# Patient Record
Sex: FEMALE | Race: WHITE | NOT HISPANIC OR LATINO | Employment: FULL TIME | URBAN - METROPOLITAN AREA
[De-identification: names, ages, dates, MRNs, and addresses within clinical notes are randomized per-mention and may not be internally consistent; named-entity substitution may affect disease eponyms.]

---

## 2017-01-31 ENCOUNTER — ALLSCRIPTS OFFICE VISIT (OUTPATIENT)
Dept: OTHER | Facility: OTHER | Age: 57
End: 2017-01-31

## 2017-05-01 ENCOUNTER — ALLSCRIPTS OFFICE VISIT (OUTPATIENT)
Dept: OTHER | Facility: OTHER | Age: 57
End: 2017-05-01

## 2017-05-01 DIAGNOSIS — M06.09 RHEUMATOID ARTHRITIS OF MULTIPLE SITES WITHOUT RHEUMATOID FACTOR (HCC): ICD-10-CM

## 2017-05-01 DIAGNOSIS — M15.0 PRIMARY GENERALIZED (OSTEO)ARTHRITIS: ICD-10-CM

## 2017-07-02 ENCOUNTER — LAB CONVERSION - ENCOUNTER (OUTPATIENT)
Dept: OTHER | Facility: OTHER | Age: 57
End: 2017-07-02

## 2017-07-02 LAB
A/G RATIO (HISTORICAL): 2 (CALC) (ref 1–2.5)
ALBUMIN SERPL BCP-MCNC: 4.4 G/DL (ref 3.6–5.1)
ALP SERPL-CCNC: 79 U/L (ref 33–130)
ALT SERPL W P-5'-P-CCNC: 18 U/L (ref 6–29)
AST SERPL W P-5'-P-CCNC: 26 U/L (ref 10–35)
BASOPHILS # BLD AUTO: 0.7 %
BASOPHILS # BLD AUTO: 39 CELLS/UL (ref 0–200)
BILIRUB SERPL-MCNC: 0.6 MG/DL (ref 0.2–1.2)
BUN SERPL-MCNC: 12 MG/DL (ref 7–25)
BUN/CREA RATIO (HISTORICAL): NORMAL (CALC) (ref 6–22)
CALCIUM SERPL-MCNC: 9.3 MG/DL (ref 8.6–10.4)
CHLORIDE SERPL-SCNC: 100 MMOL/L (ref 98–110)
CHOLEST SERPL-MCNC: 172 MG/DL (ref 125–200)
CHOLEST/HDLC SERPL: 1.8 (CALC)
CO2 SERPL-SCNC: 26 MMOL/L (ref 20–31)
CREAT SERPL-MCNC: 0.87 MG/DL (ref 0.5–1.05)
DEPRECATED RDW RBC AUTO: 12.5 % (ref 11–15)
EGFR AFRICAN AMERICAN (HISTORICAL): 85 ML/MIN/1.73M2
EGFR-AMERICAN CALC (HISTORICAL): 73 ML/MIN/1.73M2
EOSINOPHIL # BLD AUTO: 1.2 %
EOSINOPHIL # BLD AUTO: 67 CELLS/UL (ref 15–500)
GAMMA GLOBULIN (HISTORICAL): 2.2 G/DL (CALC) (ref 1.9–3.7)
GLUCOSE (HISTORICAL): 74 MG/DL (ref 65–99)
HCT VFR BLD AUTO: 39.4 % (ref 35–45)
HDLC SERPL-MCNC: 94 MG/DL
HGB BLD-MCNC: 13.4 G/DL (ref 11.7–15.5)
LDL CHOLESTEROL (HISTORICAL): 64 MG/DL (CALC)
LYMPHOCYTES # BLD AUTO: 1114 CELLS/UL (ref 850–3900)
LYMPHOCYTES # BLD AUTO: 19.9 %
MCH RBC QN AUTO: 33.9 PG (ref 27–33)
MCHC RBC AUTO-ENTMCNC: 34.1 G/DL (ref 32–36)
MCV RBC AUTO: 99.5 FL (ref 80–100)
MONOCYTES # BLD AUTO: 386 CELLS/UL (ref 200–950)
MONOCYTES (HISTORICAL): 6.9 %
NEUTROPHILS # BLD AUTO: 3993 CELLS/UL (ref 1500–7800)
NEUTROPHILS # BLD AUTO: 71.3 %
NON-HDL-CHOL (CHOL-HDL) (HISTORICAL): 78 MG/DL (CALC)
PLATELET # BLD AUTO: 242 THOUSAND/UL (ref 140–400)
PMV BLD AUTO: 8 FL (ref 7.5–12.5)
POTASSIUM SERPL-SCNC: 3.7 MMOL/L (ref 3.5–5.3)
RBC # BLD AUTO: 3.96 MILLION/UL (ref 3.8–5.1)
SODIUM SERPL-SCNC: 137 MMOL/L (ref 135–146)
TOTAL PROTEIN (HISTORICAL): 6.6 G/DL (ref 6.1–8.1)
TRIGL SERPL-MCNC: 68 MG/DL
TSH SERPL DL<=0.05 MIU/L-ACNC: 1.49 MIU/L (ref 0.4–4.5)
WBC # BLD AUTO: 5.6 THOUSAND/UL (ref 3.8–10.8)

## 2017-07-20 ENCOUNTER — ALLSCRIPTS OFFICE VISIT (OUTPATIENT)
Dept: OTHER | Facility: OTHER | Age: 57
End: 2017-07-20

## 2017-08-01 ENCOUNTER — ALLSCRIPTS OFFICE VISIT (OUTPATIENT)
Dept: OTHER | Facility: OTHER | Age: 57
End: 2017-08-01

## 2017-10-19 ENCOUNTER — LAB CONVERSION - ENCOUNTER (OUTPATIENT)
Dept: OTHER | Facility: OTHER | Age: 57
End: 2017-10-19

## 2017-10-19 LAB
C-REACTIVE PROTEIN (HISTORICAL): 1.1 MG/L
ERYTHROCYTE SEDIMENTATION RATE (HISTORICAL): 2 MM/H

## 2017-11-14 ENCOUNTER — HOSPITAL ENCOUNTER (OUTPATIENT)
Facility: HOSPITAL | Age: 57
Setting detail: OBSERVATION
Discharge: HOME | End: 2017-11-15
Attending: EMERGENCY MEDICINE | Admitting: INTERNAL MEDICINE
Payer: COMMERCIAL

## 2017-11-14 ENCOUNTER — APPOINTMENT (EMERGENCY)
Dept: RADIOLOGY | Facility: HOSPITAL | Age: 57
End: 2017-11-14
Payer: COMMERCIAL

## 2017-11-14 DIAGNOSIS — R42 DIZZINESS: Primary | ICD-10-CM

## 2017-11-14 PROBLEM — J45.909 ASTHMA: Status: ACTIVE | Noted: 2017-11-14

## 2017-11-14 PROBLEM — F41.9 ANXIETY: Status: ACTIVE | Noted: 2017-11-14

## 2017-11-14 PROBLEM — K21.9 GERD (GASTROESOPHAGEAL REFLUX DISEASE): Status: ACTIVE | Noted: 2017-11-14

## 2017-11-14 PROBLEM — R11.2 NAUSEA AND VOMITING: Status: ACTIVE | Noted: 2017-11-14

## 2017-11-14 PROBLEM — I10 HYPERTENSION: Status: ACTIVE | Noted: 2017-11-14

## 2017-11-14 PROBLEM — E87.6 HYPOKALEMIA: Status: ACTIVE | Noted: 2017-11-14

## 2017-11-14 PROBLEM — J30.9 ALLERGIC RHINITIS: Status: ACTIVE | Noted: 2017-11-14

## 2017-11-14 LAB
ALBUMIN SERPL BCP-MCNC: 4.1 G/DL (ref 3.5–5)
ALP SERPL-CCNC: 77 U/L (ref 46–116)
ALT SERPL W P-5'-P-CCNC: 30 U/L (ref 12–78)
ANION GAP SERPL CALCULATED.3IONS-SCNC: 10 MMOL/L (ref 4–13)
AST SERPL W P-5'-P-CCNC: 29 U/L (ref 5–45)
BASOPHILS # BLD AUTO: 0 THOUSANDS/ΜL (ref 0–0.1)
BASOPHILS NFR BLD AUTO: 1 % (ref 0–1)
BILIRUB SERPL-MCNC: 0.7 MG/DL (ref 0.2–1)
BILIRUB UR QL STRIP: NEGATIVE
BUN SERPL-MCNC: 9 MG/DL (ref 5–25)
CALCIUM SERPL-MCNC: 9.4 MG/DL (ref 8.3–10.1)
CHLORIDE SERPL-SCNC: 101 MMOL/L (ref 100–108)
CLARITY UR: CLEAR
CO2 SERPL-SCNC: 28 MMOL/L (ref 21–32)
COLOR UR: NORMAL
CREAT SERPL-MCNC: 0.83 MG/DL (ref 0.6–1.3)
EOSINOPHIL # BLD AUTO: 0.1 THOUSAND/ΜL (ref 0–0.61)
EOSINOPHIL NFR BLD AUTO: 2 % (ref 0–6)
ERYTHROCYTE [DISTWIDTH] IN BLOOD BY AUTOMATED COUNT: 12.6 % (ref 11.6–15.1)
GFR SERPL CREATININE-BSD FRML MDRD: 77 ML/MIN/1.73SQ M
GLUCOSE SERPL-MCNC: 147 MG/DL (ref 65–140)
GLUCOSE UR STRIP-MCNC: NEGATIVE MG/DL
HCT VFR BLD AUTO: 43.3 % (ref 37–47)
HGB BLD-MCNC: 14.6 G/DL (ref 12–16)
HGB UR QL STRIP.AUTO: NEGATIVE
HOLD SPECIMEN: NORMAL
KETONES UR STRIP-MCNC: NEGATIVE MG/DL
LEUKOCYTE ESTERASE UR QL STRIP: NEGATIVE
LIPASE SERPL-CCNC: 299 U/L (ref 73–393)
LYMPHOCYTES # BLD AUTO: 1.6 THOUSANDS/ΜL (ref 0.6–4.47)
LYMPHOCYTES NFR BLD AUTO: 38 % (ref 14–44)
MAGNESIUM SERPL-MCNC: 1.9 MG/DL (ref 1.6–2.6)
MCH RBC QN AUTO: 34.5 PG (ref 27–31)
MCHC RBC AUTO-ENTMCNC: 33.6 G/DL (ref 31.4–37.4)
MCV RBC AUTO: 103 FL (ref 82–98)
MONOCYTES # BLD AUTO: 0.4 THOUSAND/ΜL (ref 0.17–1.22)
MONOCYTES NFR BLD AUTO: 10 % (ref 4–12)
NEUTROPHILS # BLD AUTO: 2.1 THOUSANDS/ΜL (ref 1.85–7.62)
NEUTS SEG NFR BLD AUTO: 49 % (ref 43–75)
NITRITE UR QL STRIP: NEGATIVE
NRBC BLD AUTO-RTO: 0 /100 WBCS
PH UR STRIP.AUTO: 7.5 [PH] (ref 5–9)
PLATELET # BLD AUTO: 267 THOUSANDS/UL (ref 130–400)
PMV BLD AUTO: 7.2 FL (ref 8.9–12.7)
POTASSIUM SERPL-SCNC: 3.3 MMOL/L (ref 3.5–5.3)
PROT SERPL-MCNC: 7.3 G/DL (ref 6.4–8.2)
PROT UR STRIP-MCNC: NEGATIVE MG/DL
RBC # BLD AUTO: 4.22 MILLION/UL (ref 4.2–5.4)
SODIUM SERPL-SCNC: 139 MMOL/L (ref 136–145)
SP GR UR STRIP.AUTO: 1.01 (ref 1–1.03)
TROPONIN I SERPL-MCNC: <0.02 NG/ML
TROPONIN I SERPL-MCNC: <0.02 NG/ML
TSH SERPL DL<=0.05 MIU/L-ACNC: 1.72 UIU/ML (ref 0.36–3.74)
UROBILINOGEN UR QL STRIP.AUTO: 0.2 E.U./DL
WBC # BLD AUTO: 4.2 THOUSAND/UL (ref 4.8–10.8)

## 2017-11-14 PROCEDURE — 83735 ASSAY OF MAGNESIUM: CPT | Performed by: NURSE PRACTITIONER

## 2017-11-14 PROCEDURE — 99285 EMERGENCY DEPT VISIT HI MDM: CPT

## 2017-11-14 PROCEDURE — 84484 ASSAY OF TROPONIN QUANT: CPT | Performed by: NURSE PRACTITIONER

## 2017-11-14 PROCEDURE — 80053 COMPREHEN METABOLIC PANEL: CPT | Performed by: EMERGENCY MEDICINE

## 2017-11-14 PROCEDURE — 93005 ELECTROCARDIOGRAM TRACING: CPT | Performed by: NURSE PRACTITIONER

## 2017-11-14 PROCEDURE — 84443 ASSAY THYROID STIM HORMONE: CPT | Performed by: NURSE PRACTITIONER

## 2017-11-14 PROCEDURE — 81003 URINALYSIS AUTO W/O SCOPE: CPT | Performed by: NURSE PRACTITIONER

## 2017-11-14 PROCEDURE — 96361 HYDRATE IV INFUSION ADD-ON: CPT

## 2017-11-14 PROCEDURE — 36415 COLL VENOUS BLD VENIPUNCTURE: CPT | Performed by: EMERGENCY MEDICINE

## 2017-11-14 PROCEDURE — 87081 CULTURE SCREEN ONLY: CPT | Performed by: INTERNAL MEDICINE

## 2017-11-14 PROCEDURE — 94760 N-INVAS EAR/PLS OXIMETRY 1: CPT

## 2017-11-14 PROCEDURE — 96374 THER/PROPH/DIAG INJ IV PUSH: CPT

## 2017-11-14 PROCEDURE — 83690 ASSAY OF LIPASE: CPT | Performed by: EMERGENCY MEDICINE

## 2017-11-14 PROCEDURE — 70450 CT HEAD/BRAIN W/O DYE: CPT

## 2017-11-14 PROCEDURE — 85025 COMPLETE CBC W/AUTO DIFF WBC: CPT | Performed by: EMERGENCY MEDICINE

## 2017-11-14 PROCEDURE — 96375 TX/PRO/DX INJ NEW DRUG ADDON: CPT

## 2017-11-14 RX ORDER — ONDANSETRON 2 MG/ML
4 INJECTION INTRAMUSCULAR; INTRAVENOUS EVERY 6 HOURS PRN
Status: DISCONTINUED | OUTPATIENT
Start: 2017-11-14 | End: 2017-11-15 | Stop reason: HOSPADM

## 2017-11-14 RX ORDER — SODIUM CHLORIDE 9 MG/ML
75 INJECTION, SOLUTION INTRAVENOUS CONTINUOUS
Status: DISCONTINUED | OUTPATIENT
Start: 2017-11-14 | End: 2017-11-15 | Stop reason: HOSPADM

## 2017-11-14 RX ORDER — MECLIZINE HYDROCHLORIDE 25 MG/1
25 TABLET ORAL ONCE
Status: COMPLETED | OUTPATIENT
Start: 2017-11-14 | End: 2017-11-14

## 2017-11-14 RX ORDER — POTASSIUM CHLORIDE 20 MEQ/1
40 TABLET, EXTENDED RELEASE ORAL ONCE
Status: COMPLETED | OUTPATIENT
Start: 2017-11-14 | End: 2017-11-14

## 2017-11-14 RX ORDER — HYDRALAZINE HYDROCHLORIDE 20 MG/ML
5 INJECTION INTRAMUSCULAR; INTRAVENOUS EVERY 6 HOURS PRN
Status: DISCONTINUED | OUTPATIENT
Start: 2017-11-14 | End: 2017-11-15 | Stop reason: HOSPADM

## 2017-11-14 RX ORDER — MECLIZINE HYDROCHLORIDE 25 MG/1
25 TABLET ORAL EVERY 8 HOURS SCHEDULED
Status: DISCONTINUED | OUTPATIENT
Start: 2017-11-14 | End: 2017-11-15 | Stop reason: HOSPADM

## 2017-11-14 RX ORDER — ONDANSETRON 2 MG/ML
4 INJECTION INTRAMUSCULAR; INTRAVENOUS ONCE
Status: COMPLETED | OUTPATIENT
Start: 2017-11-14 | End: 2017-11-14

## 2017-11-14 RX ORDER — FLUTICASONE PROPIONATE 50 MCG
1 SPRAY, SUSPENSION (ML) NASAL DAILY PRN
Status: DISCONTINUED | OUTPATIENT
Start: 2017-11-14 | End: 2017-11-15 | Stop reason: HOSPADM

## 2017-11-14 RX ORDER — RABEPRAZOLE SODIUM 20 MG/1
20 TABLET, DELAYED RELEASE ORAL DAILY
COMMUNITY
End: 2018-03-06 | Stop reason: SDUPTHER

## 2017-11-14 RX ORDER — ONDANSETRON 2 MG/ML
INJECTION INTRAMUSCULAR; INTRAVENOUS
Status: COMPLETED
Start: 2017-11-14 | End: 2017-11-14

## 2017-11-14 RX ORDER — SODIUM CHLORIDE 9 MG/ML
1000 INJECTION, SOLUTION INTRAVENOUS ONCE
Status: COMPLETED | OUTPATIENT
Start: 2017-11-14 | End: 2017-11-14

## 2017-11-14 RX ORDER — HYDROCHLOROTHIAZIDE 25 MG/1
25 TABLET ORAL DAILY
COMMUNITY
End: 2018-02-28 | Stop reason: SDUPTHER

## 2017-11-14 RX ORDER — CALCIUM CARBONATE 200(500)MG
1000 TABLET,CHEWABLE ORAL DAILY PRN
Status: DISCONTINUED | OUTPATIENT
Start: 2017-11-14 | End: 2017-11-15 | Stop reason: HOSPADM

## 2017-11-14 RX ORDER — FLUTICASONE PROPIONATE 50 MCG
1 SPRAY, SUSPENSION (ML) NASAL DAILY PRN
COMMUNITY
End: 2018-10-17 | Stop reason: SDUPTHER

## 2017-11-14 RX ORDER — MORPHINE SULFATE 4 MG/ML
4 INJECTION, SOLUTION INTRAMUSCULAR; INTRAVENOUS ONCE
Status: COMPLETED | OUTPATIENT
Start: 2017-11-14 | End: 2017-11-14

## 2017-11-14 RX ORDER — LEVALBUTEROL TARTRATE 45 UG/1
1-2 AEROSOL, METERED ORAL EVERY 4 HOURS PRN
COMMUNITY
End: 2019-09-17 | Stop reason: SDUPTHER

## 2017-11-14 RX ORDER — ACETAMINOPHEN 325 MG/1
650 TABLET ORAL EVERY 6 HOURS PRN
Status: DISCONTINUED | OUTPATIENT
Start: 2017-11-14 | End: 2017-11-15 | Stop reason: HOSPADM

## 2017-11-14 RX ORDER — LEVALBUTEROL 1.25 MG/.5ML
1.25 SOLUTION, CONCENTRATE RESPIRATORY (INHALATION) EVERY 6 HOURS PRN
Status: DISCONTINUED | OUTPATIENT
Start: 2017-11-14 | End: 2017-11-15 | Stop reason: HOSPADM

## 2017-11-14 RX ORDER — RABEPRAZOLE SODIUM 20 MG/1
20 TABLET, DELAYED RELEASE ORAL DAILY
Status: DISCONTINUED | OUTPATIENT
Start: 2017-11-15 | End: 2017-11-15 | Stop reason: HOSPADM

## 2017-11-14 RX ADMIN — ONDANSETRON 4 MG: 2 INJECTION INTRAMUSCULAR; INTRAVENOUS at 07:58

## 2017-11-14 RX ADMIN — MECLIZINE HYDROCHLORIDE 25 MG: 25 TABLET ORAL at 22:01

## 2017-11-14 RX ADMIN — POTASSIUM CHLORIDE 40 MEQ: 1500 TABLET, EXTENDED RELEASE ORAL at 16:59

## 2017-11-14 RX ADMIN — SODIUM CHLORIDE 75 ML/HR: 0.9 INJECTION, SOLUTION INTRAVENOUS at 16:58

## 2017-11-14 RX ADMIN — SODIUM CHLORIDE 1000 ML/HR: 0.9 INJECTION, SOLUTION INTRAVENOUS at 07:58

## 2017-11-14 RX ADMIN — MECLIZINE HYDROCHLORIDE 25 MG: 25 TABLET ORAL at 16:59

## 2017-11-14 RX ADMIN — MECLIZINE HYDROCHLORIDE 25 MG: 25 TABLET ORAL at 11:19

## 2017-11-14 RX ADMIN — MORPHINE SULFATE 4 MG: 4 INJECTION, SOLUTION INTRAMUSCULAR; INTRAVENOUS at 08:27

## 2017-11-14 NOTE — ED PROVIDER NOTES
History  Chief Complaint   Patient presents with    Vomiting     Pt states she was at work and felt dizzy and nauseous  Also vomiting  History of vertigo  Sensitive to movement but not light  38-year-old female states that she was at work this morning after getting up and everything was normal  States that she started getting dizziness shortly after she use nasal spray for her clot nostrils at work  Patient shortly thereafter started vomiting  No other complaints        History provided by:  Patient   used: No        Prior to Admission Medications   Prescriptions Last Dose Informant Patient Reported? Taking? RABEprazole (ACIPHEX) 20 MG tablet   Yes Yes   Sig: Take 20 mg by mouth daily   fluticasone (FLONASE) 50 mcg/act nasal spray   Yes Yes   Si spray into each nostril daily as needed for rhinitis   hydrochlorothiazide (HYDRODIURIL) 25 mg tablet   Yes Yes   Sig: Take 25 mg by mouth daily   levalbuterol (XOPENEX HFA) 45 mcg/act inhaler   Yes Yes   Sig: Inhale 1-2 puffs every 4 (four) hours as needed for wheezing      Facility-Administered Medications: None       History reviewed  No pertinent past medical history  Past Surgical History:   Procedure Laterality Date    DENTAL SURGERY         History reviewed  No pertinent family history  I have reviewed and agree with the history as documented  Social History   Substance Use Topics    Smoking status: Never Smoker    Smokeless tobacco: Never Used    Alcohol use Yes      Comment: social        Review of Systems   Constitutional: Negative for activity change, chills, diaphoresis and fever  HENT: Negative for congestion, ear pain, nosebleeds, sore throat, trouble swallowing and voice change  Eyes: Negative for pain, discharge and redness  Respiratory: Negative for apnea, cough, choking, shortness of breath, wheezing and stridor  Cardiovascular: Negative for chest pain and palpitations     Gastrointestinal: Positive for nausea and vomiting  Negative for abdominal distention, abdominal pain, constipation and diarrhea  Endocrine: Negative for polydipsia  Genitourinary: Negative for difficulty urinating, dysuria, flank pain, frequency, hematuria and urgency  Musculoskeletal: Negative for back pain, gait problem, joint swelling, myalgias, neck pain and neck stiffness  Skin: Negative for pallor and rash  Neurological: Positive for dizziness  Negative for tremors, syncope, speech difficulty, weakness, numbness and headaches  Hematological: Negative for adenopathy  Psychiatric/Behavioral: Negative for confusion, hallucinations, self-injury and suicidal ideas  The patient is not nervous/anxious  Physical Exam  ED Triage Vitals   Temperature Pulse Respirations Blood Pressure SpO2   11/14/17 0748 11/14/17 0753 11/14/17 0753 11/14/17 0753 11/14/17 0753   (!) 95 7 °F (35 4 °C) 94 16 (!) 172/85 98 %      Temp Source Heart Rate Source Patient Position - Orthostatic VS BP Location FiO2 (%)   11/14/17 0748 11/14/17 0753 11/14/17 0753 11/14/17 0753 --   Tympanic Monitor Sitting Left arm       Pain Score       11/14/17 0753       8           Orthostatic Vital Signs  Vitals:    11/14/17 1115 11/14/17 1130 11/14/17 1145 11/14/17 1200   BP: 100/55 103/57 108/58 105/57   Pulse: 76      Patient Position - Orthostatic VS:           Physical Exam   Constitutional: Vital signs are normal  She appears well-developed and well-nourished  HENT:   Head: Normocephalic and atraumatic  Right Ear: External ear normal    Left Ear: External ear normal    Nose: Nose normal    Mouth/Throat: Oropharynx is clear and moist    Eyes: Conjunctivae and EOM are normal  Pupils are equal, round, and reactive to light  Neck: Normal range of motion  Neck supple  Cardiovascular: Normal rate, regular rhythm, normal heart sounds and intact distal pulses  Pulmonary/Chest: Effort normal and breath sounds normal    Abdominal: Soft   Bowel sounds are normal  Musculoskeletal: Normal range of motion  Neurological: She is alert  Skin: Skin is warm  Psychiatric: She has a normal mood and affect  Nursing note and vitals reviewed  ED Medications  Medications   ondansetron (ZOFRAN) injection 4 mg (4 mg Intravenous Given 11/14/17 0758)   sodium chloride 0 9 % infusion (0 mL/hr Intravenous Stopped 11/14/17 0939)   meclizine (ANTIVERT) tablet 25 mg (25 mg Oral Given 11/14/17 1119)   morphine (PF) 4 mg/mL injection 4 mg (4 mg Intravenous Given 11/14/17 0827)       Diagnostic Studies  Results Reviewed     Procedure Component Value Units Date/Time    Comprehensive metabolic panel [[id number]]  (Abnormal) Collected:  11/14/17 0757    Lab Status:  Final result Specimen:  Blood from Arm, Right Updated:  11/14/17 7672     Sodium 139 mmol/L      Potassium 3 3 (L) mmol/L      Chloride 101 mmol/L      CO2 28 mmol/L      Anion Gap 10 mmol/L      BUN 9 mg/dL      Creatinine 0 83 mg/dL      Glucose 147 (H) mg/dL      Calcium 9 4 mg/dL      AST 29 U/L      ALT 30 U/L      Alkaline Phosphatase 77 U/L      Total Protein 7 3 g/dL      Albumin 4 1 g/dL      Total Bilirubin 0 70 mg/dL      eGFR 77 ml/min/1 73sq m     Narrative:         National Kidney Disease Education Program recommendations are as follows:  GFR calculation is accurate only with a steady state creatinine  Chronic Kidney disease less than 60 ml/min/1 73 sq  meters  Kidney failure less than 15 ml/min/1 73 sq  meters      Lipase [[id number]]  (Normal) Collected:  11/14/17 0757    Lab Status:  Final result Specimen:  Blood from Arm, Right Updated:  11/14/17 0829     Lipase 299 u/L     CBC and differential [[id number]]  (Abnormal) Collected:  11/14/17 0757    Lab Status:  Final result Specimen:  Blood from Arm, Right Updated:  11/14/17 0806     WBC 4 20 (L) Thousand/uL      RBC 4 22 Million/uL      Hemoglobin 14 6 g/dL      Hematocrit 43 3 %       (H) fL      MCH 34 5 (H) pg      MCHC 33 6 g/dL      RDW 12 6 %      MPV 7 2 (L) fL      Platelets 810 Thousands/uL      nRBC 0 /100 WBCs      Neutrophils Relative 49 %      Lymphocytes Relative 38 %      Monocytes Relative 10 %      Eosinophils Relative 2 %      Basophils Relative 1 %      Neutrophils Absolute 2 10 Thousands/µL      Lymphocytes Absolute 1 60 Thousands/µL      Monocytes Absolute 0 40 Thousand/µL      Eosinophils Absolute 0 10 Thousand/µL      Basophils Absolute 0 00 Thousands/µL                  CT head without contrast   Final Result by Shruthi Willis MD (11/14 0939)      No acute intracranial abnormality  Workstation performed: [id number]                    Procedures  Procedures       Phone Contacts  ED Phone Contact    ED Course  ED Course as of Nov 14 1224   Tue Nov 14, 2017   1350 Hemoglobin: 14 6   1053 Hemoglobin: 14 6             NIH Stroke Scale    Flowsheet Row Most Recent Value   Level of Consciousness (1a )  0 Filed at: 11/14/2017 1220   LOC Questions (1b )  0 Filed at: 11/14/2017 1220   LOC Commands (1c )  0 Filed at: 11/14/2017 1220   Best Gaze (2 )  0 Filed at: 11/14/2017 1220   Visual (3 )  0 Filed at: 11/14/2017 1220   Facial Palsy (4 )  0 Filed at: 11/14/2017 1220   Motor Arm, Left (5a )  0 Filed at: 11/14/2017 1220   Motor Arm, Right (5b )  0 Filed at: 11/14/2017 1220   Motor Leg, Left (6a )  0 Filed at: 11/14/2017 1220   Motor Leg, Right (6b )  0 Filed at: 11/14/2017 1220   Limb Ataxia (7 )  0 Filed at: 11/14/2017 1220   Sensory (8 )  0 Filed at: 11/14/2017 1220   Best Language (9 )  0 Filed at: 11/14/2017 1220   Dysarthria (10 )  0 Filed at: 11/14/2017 1220   Extinction and Inattention (11 ) (Formerly Neglect)  0 Filed at: 11/14/2017 1220   Total  0 Filed at: 11/14/2017 1220                        MDM  Number of Diagnoses or Management Options  Dizziness:   Diagnosis management comments: I discussed with patient about staying in the hospital for observation due to still some difficulty with balance and her walking    NIH score is 0 however is not real specific for posterior circulation cerebellar issues  Patient is agreeable to stay in the hospital for further workup she has getting some Antivert with not much help  I discussed with Dr Singer Rater from hospitalist service who agrees with admission  CritCare Time    Disposition  Final diagnoses:   Dizziness     Time reflects when diagnosis was documented in both MDM as applicable and the Disposition within this note     Time User Action Codes Description Comment    11/14/2017 12:22 PM Oscar Swapnil Add [R42] Dizziness       ED Disposition     ED Disposition Condition Comment    Admit  Case was discussed with Dr Pedro Lagos and the patient's admission status was agreed to be Admission Status: observation status to the service of Dr Pedro Lagos   Follow-up Information    None       Patient's Medications   Discharge Prescriptions    No medications on file     No discharge procedures on file      ED Provider  Electronically Signed by           Mirna Simeon DO  11/14/17 2459

## 2017-11-14 NOTE — ED NOTES
Discharge instructions reviewed with patient  States understanding  Discharged to home       Carrie Fischer RN  11/14/17 1621

## 2017-11-14 NOTE — LETTER
November 15, 2017     Patient: Megan Wilson   YOB: 1960   Date of Visit: 11/14/2017       To Whom it May Concern:    Richard Andrade was seen in my clinic on 11/14/2017  She may return to work on November 20, 2017  If you have any questions or concerns, please don't hesitate to call  Sincerely,          JUDI Ryan        CC: No Recipients

## 2017-11-14 NOTE — H&P
History and Physical - [facility] Medicine    Patient Information: Neil Salazar 59 y o  female MRN: [id number]  Unit/Bed#: [facility] 310-01 Encounter: [id number]  Admitting Physician: JUDI Bustillos  PCP: Kodak Boggs MD  Date of Admission:  11/14/17    Assessment/Plan:    Hospital Problem List:     Principal Problem:    Dizziness  Active Problems:    Nausea and vomiting    Hypokalemia    Hypertension    GERD (gastroesophageal reflux disease)    Asthma    Allergic rhinitis    Anxiety      Plan for the Primary Problem(s):  Dizziness: likely vertigo   · Admit to general medicine as observation for further evaluation and treatment   · Obtain MRI brain   · Initiate IVF, scheduled Meclizine, and Zofran as needed   · Ensure electrolytes are optimized  · Check troponin level, TSH, lipid panel, A1c, Vit B12, folate, Vit D, UA, and EKG   · Monitor on continuous telemetry monitor  · PT/OT eval and treat   · Hold HCTZ    Plan for Additional Problems:   Nausea and Vomiting: Initiate Zofran IV as needed  Hypokalemia: Likely from vomiting  K 3 3  Replete and monitor in am  Check serum magnesium level  Hypertension: Holding HCTZ due to vertigo  Hydralazine IV PRN  Monitor  GERD: Pt would like to bring in home medication, Aciphex  Asthma: Well controlled  C/w Xopenex nebulizer treatments as needed  Allergic Rhinitis: C/w Flonase as needed  Anxiety: No longer taking Buspar  Monitor  VTE Prophylaxis: Enoxaparin (Lovenox)  / sequential compression device   Code Status: FULL CODE  POLST: POLST form is not discussed and not completed at this time  Anticipated Length of Stay:  Patient will be admitted on an Observation basis with an anticipated length of stay of  less than 2 midnights  Justification for Hospital Stay: vertigo, nausea, vomiting     Total Time for Visit, including Counseling / Coordination of Care: 1 hour    Greater than 50% of this total time spent on direct patient counseling and coordination of care  Chief Complaint:   Dizziness, nausea, vomiting     History of Present Illness:    Celia Blancas is a 59 y o  female with a PMH including HTN, GERD, asthma, allergic rhinitis, and anxiety who presents with dizziness  States she was in her usual state of health when she took Flonase this morning and felt like the medication 'went in the wrong area' and she subsequently developed dizziness  States her dizziness was associated with lightheadedness, nausea and vomiting  Denies headache, shortness of breath, chest pain, abdominal pain, dysuria, hematuria, or melena  Denies new medications or recent travel  Review of Systems:    Review of Systems   Constitutional: Negative for activity change, appetite change, chills, diaphoresis, fatigue and fever  HENT: Positive for congestion and ear pain  Negative for postnasal drip  Eyes: Negative for photophobia and visual disturbance  Respiratory: Negative for cough, chest tightness, shortness of breath and wheezing  Cardiovascular: Negative for chest pain, palpitations and leg swelling  Gastrointestinal: Positive for nausea and vomiting  Negative for abdominal distention, abdominal pain, blood in stool, constipation and diarrhea  Genitourinary: Negative for difficulty urinating, dysuria and hematuria  Musculoskeletal: Positive for back pain  Negative for arthralgias, gait problem and myalgias  Skin: Negative for color change, pallor and wound  Neurological: Positive for dizziness and weakness  Negative for tremors, seizures, syncope, light-headedness and headaches  Psychiatric/Behavioral: Negative for agitation and confusion  The patient is not nervous/anxious          Past Medical and Surgical History:     Past Medical History:   Diagnosis Date    Arthritis     hands       Past Surgical History:   Procedure Laterality Date    BREAST SURGERY      CARPAL TUNNEL RELEASE Bilateral     DENTAL SURGERY      WRIST SURGERY Right 2009       Meds/Allergies:    Prior to Admission medications    Medication Sig Start Date End Date Taking? Authorizing Provider   fluticasone (FLONASE) 50 mcg/act nasal spray 1 spray into each nostril daily as needed for rhinitis   Yes Historical Provider, MD   hydrochlorothiazide (HYDRODIURIL) 25 mg tablet Take 25 mg by mouth daily   Yes Historical Provider, MD   levalbuterol (XOPENEX HFA) 45 mcg/act inhaler Inhale 1-2 puffs every 4 (four) hours as needed for wheezing   Yes Historical Provider, MD   RABEprazole (ACIPHEX) 20 MG tablet Take 20 mg by mouth daily   Yes Historical Provider, MD     I have reviewed home medications with patient personally  Allergies: No Known Allergies    Social History:     Marital Status: /Civil Union   Patient Pre-hospital Living Situation: Home with    Patient Pre-hospital Level of Mobility: Independent without use of assistive device   Patient Pre-hospital Diet Restrictions: None  Substance Use History:   History   Alcohol Use    Yes     Comment: social     History   Smoking Status    Never Smoker   Smokeless Tobacco    Never Used     History   Drug Use No       Family History:    non-contributory    Physical Exam:     Vitals:   Blood Pressure: 116/55 (11/14/17 1314)  Pulse: 71 (11/14/17 1314)  Temperature: 98 2 °F (36 8 °C) (11/14/17 1314)  Temp Source: Oral (11/14/17 1314)  Respirations: 16 (11/14/17 1314)  Height: 5' 5 5" (166 4 cm) (11/14/17 1314)  Weight - Scale: 65 1 kg (143 lb 8 3 oz) (11/14/17 1314)  SpO2: 98 % (11/14/17 1314)    Physical Exam   Constitutional: She is oriented to person, place, and time  She appears well-developed and well-nourished  She is cooperative  No distress  HENT:   Head: Normocephalic and atraumatic  Right Ear: Hearing, tympanic membrane, external ear and ear canal normal  No drainage, swelling or tenderness  No decreased hearing is noted     Left Ear: Hearing, tympanic membrane, external ear and ear canal normal  Mouth/Throat: Oropharynx is clear and moist    Eyes: Conjunctivae are normal  Pupils are equal, round, and reactive to light  Right eye exhibits no discharge and no exudate  Left eye exhibits no discharge and no exudate  No scleral icterus  Right eye exhibits nystagmus  Left eye exhibits nystagmus  Neck: Normal range of motion  Neck supple  Cardiovascular: Normal rate, regular rhythm, normal heart sounds and intact distal pulses  Pulmonary/Chest: Effort normal and breath sounds normal  No respiratory distress  She has no wheezes  She has no rales  Abdominal: Soft  Bowel sounds are normal  She exhibits no distension  There is no tenderness  There is no rebound and no guarding  Musculoskeletal: Normal range of motion  She exhibits no edema or tenderness  Neurological: She is alert and oriented to person, place, and time  No cranial nerve deficit  Skin: Skin is warm and dry  She is not diaphoretic  No erythema  Psychiatric: She has a normal mood and affect  Her behavior is normal  Judgment and thought content normal        Additional Data:   Labs:    Results from last 7 days  Lab Units 11/14/17  0757   WBC Thousand/uL 4 20*   HEMOGLOBIN g/dL 14 6   HEMATOCRIT % 43 3   PLATELETS Thousands/uL 267   NEUTROS PCT % 49   LYMPHS PCT % 38   MONOS PCT % 10   EOS PCT % 2       Results from last 7 days  Lab Units 11/14/17  0757   SODIUM mmol/L 139   POTASSIUM mmol/L 3 3*   CHLORIDE mmol/L 101   CO2 mmol/L 28   BUN mg/dL 9   CREATININE mg/dL 0 83   CALCIUM mg/dL 9 4   TOTAL PROTEIN g/dL 7 3   BILIRUBIN TOTAL mg/dL 0 70   ALK PHOS U/L 77   ALT U/L 30   AST U/L 29   GLUCOSE RANDOM mg/dL 147*           * I Have Reviewed All Lab Data Listed Above  * Additional Pertinent Lab Tests Reviewed: All Labs Within Last 24 Hours Reviewed    Imaging:  Imaging Reports Reviewed Today Include: Procedure: Ct Head Without Contrast    Result Date: 11/14/2017  Narrative: CT BRAIN - WITHOUT CONTRAST INDICATION:  Dizziness  COMPARISON:  None  TECHNIQUE:  CT examination of the brain was performed  In addition to axial images, coronal reformatted images were created and submitted for interpretation  Radiation dose length product (DLP) for this visit:  1159 17 mGy-cm   This examination, like all CT scans performed in the [facility], was performed utilizing techniques to minimize radiation dose exposure, including the use of iterative reconstruction and automated exposure control  IMAGE QUALITY:  Diagnostic  FINDINGS:  PARENCHYMA:  No intracranial mass, mass effect or midline shift  No CT signs of acute infarction  There is no parenchymal hemorrhage  VENTRICLES AND EXTRA-AXIAL SPACES:  Normal for patient's age  VISUALIZED ORBITS AND PARANASAL SINUSES:  Trace mucosal disease in the left sphenoid sinus  No air-fluid levels  Normal orbits  CALVARIUM AND EXTRACRANIAL SOFT TISSUES:  Osteopenia  No fracture or focal lytic/blastic lesions  No acute soft tissue swelling  Impression: No acute intracranial abnormality  Workstation performed: [id number]       EKG, Pathology, and Other Studies Reviewed on Admission:   · EKG: None available - will order    Allscripts Records Reviewed: Yes     ** Please Note: Dragon 360 Dictation voice to text software may have been used in the creation of this document   **

## 2017-11-14 NOTE — ED NOTES
Pt amb to BR with Ax2  Gait unsteady  C/O dizziness  MD aware       Jamison Higginbotham, RN  11/14/17 1200

## 2017-11-14 NOTE — PLAN OF CARE
Problem: RESPIRATORY - ADULT  Goal: Achieves optimal ventilation and oxygenation  INTERVENTIONS:  - Assess for changes in respiratory status  - Assess for changes in mentation and behavior  - Position to facilitate oxygenation and minimize respiratory effort  - Oxygen administration by appropriate delivery method based on oxygen saturation (per order) or ABGs  - Initiate smoking cessation education as indicated  - Encourage broncho-pulmonary hygiene including cough, deep breathe, Incentive Spirometry  - Assess the need for suctioning and aspirate as needed  - Assess and instruct to report SOB or any respiratory difficulty  - Respiratory Therapy support as indicated  Outcome: Progressing  resp poc intiated for prn neb tx,  Pt states hasnt used at home for asthma for two years,  Will follow and update on 11/17

## 2017-11-15 ENCOUNTER — APPOINTMENT (OUTPATIENT)
Dept: RADIOLOGY | Facility: HOSPITAL | Age: 57
End: 2017-11-15
Payer: COMMERCIAL

## 2017-11-15 VITALS
WEIGHT: 143.52 LBS | SYSTOLIC BLOOD PRESSURE: 114 MMHG | RESPIRATION RATE: 16 BRPM | HEIGHT: 66 IN | HEART RATE: 65 BPM | BODY MASS INDEX: 23.07 KG/M2 | OXYGEN SATURATION: 96 % | DIASTOLIC BLOOD PRESSURE: 57 MMHG | TEMPERATURE: 98.3 F

## 2017-11-15 PROBLEM — E87.6 HYPOKALEMIA: Status: RESOLVED | Noted: 2017-11-14 | Resolved: 2017-11-15

## 2017-11-15 PROBLEM — R42 DIZZINESS: Status: RESOLVED | Noted: 2017-11-14 | Resolved: 2017-11-15

## 2017-11-15 PROBLEM — R11.2 NAUSEA AND VOMITING: Status: RESOLVED | Noted: 2017-11-14 | Resolved: 2017-11-15

## 2017-11-15 LAB
ANION GAP SERPL CALCULATED.3IONS-SCNC: 6 MMOL/L (ref 4–13)
ATRIAL RATE: 68 BPM
BUN SERPL-MCNC: 5 MG/DL (ref 5–25)
CALCIUM SERPL-MCNC: 8.6 MG/DL (ref 8.3–10.1)
CHLORIDE SERPL-SCNC: 108 MMOL/L (ref 100–108)
CHOLEST SERPL-MCNC: 173 MG/DL (ref 50–200)
CO2 SERPL-SCNC: 28 MMOL/L (ref 21–32)
CREAT SERPL-MCNC: 0.79 MG/DL (ref 0.6–1.3)
ERYTHROCYTE [DISTWIDTH] IN BLOOD BY AUTOMATED COUNT: 12.7 % (ref 11.6–15.1)
EST. AVERAGE GLUCOSE BLD GHB EST-MCNC: 105 MG/DL
FOLATE SERPL-MCNC: 19.3 NG/ML (ref 3.1–17.5)
GFR SERPL CREATININE-BSD FRML MDRD: 82 ML/MIN/1.73SQ M
GLUCOSE P FAST SERPL-MCNC: 98 MG/DL (ref 65–99)
GLUCOSE SERPL-MCNC: 98 MG/DL (ref 65–140)
HBA1C MFR BLD: 5.3 % (ref 4.2–6.3)
HCT VFR BLD AUTO: 39.8 % (ref 37–47)
HDLC SERPL-MCNC: 102 MG/DL (ref 40–60)
HGB BLD-MCNC: 12.9 G/DL (ref 12–16)
LDLC SERPL CALC-MCNC: 63 MG/DL (ref 0–100)
MCH RBC QN AUTO: 33.3 PG (ref 27–31)
MCHC RBC AUTO-ENTMCNC: 32.5 G/DL (ref 31.4–37.4)
MCV RBC AUTO: 103 FL (ref 82–98)
P AXIS: 48 DEGREES
PLATELET # BLD AUTO: 196 THOUSANDS/UL (ref 130–400)
PMV BLD AUTO: 7.5 FL (ref 8.9–12.7)
POTASSIUM SERPL-SCNC: 4.6 MMOL/L (ref 3.5–5.3)
PR INTERVAL: 148 MS
QRS AXIS: 64 DEGREES
QRSD INTERVAL: 70 MS
QT INTERVAL: 396 MS
QTC INTERVAL: 421 MS
RBC # BLD AUTO: 3.88 MILLION/UL (ref 4.2–5.4)
SODIUM SERPL-SCNC: 142 MMOL/L (ref 136–145)
T WAVE AXIS: 45 DEGREES
TRIGL SERPL-MCNC: 42 MG/DL
VENTRICULAR RATE: 68 BPM
VIT B12 SERPL-MCNC: 526 PG/ML (ref 100–900)
WBC # BLD AUTO: 4.3 THOUSAND/UL (ref 4.8–10.8)

## 2017-11-15 PROCEDURE — 80061 LIPID PANEL: CPT | Performed by: NURSE PRACTITIONER

## 2017-11-15 PROCEDURE — 82652 VIT D 1 25-DIHYDROXY: CPT | Performed by: NURSE PRACTITIONER

## 2017-11-15 PROCEDURE — 83036 HEMOGLOBIN GLYCOSYLATED A1C: CPT | Performed by: NURSE PRACTITIONER

## 2017-11-15 PROCEDURE — 80048 BASIC METABOLIC PNL TOTAL CA: CPT | Performed by: NURSE PRACTITIONER

## 2017-11-15 PROCEDURE — 70551 MRI BRAIN STEM W/O DYE: CPT

## 2017-11-15 PROCEDURE — 82607 VITAMIN B-12: CPT | Performed by: NURSE PRACTITIONER

## 2017-11-15 PROCEDURE — 94760 N-INVAS EAR/PLS OXIMETRY 1: CPT

## 2017-11-15 PROCEDURE — 85027 COMPLETE CBC AUTOMATED: CPT | Performed by: NURSE PRACTITIONER

## 2017-11-15 PROCEDURE — 82746 ASSAY OF FOLIC ACID SERUM: CPT | Performed by: NURSE PRACTITIONER

## 2017-11-15 RX ORDER — MECLIZINE HYDROCHLORIDE 25 MG/1
25 TABLET ORAL EVERY 8 HOURS PRN
Qty: 10 TABLET | Refills: 0 | Status: SHIPPED | OUTPATIENT
Start: 2017-11-15

## 2017-11-15 RX ADMIN — SODIUM CHLORIDE 75 ML/HR: 0.9 INJECTION, SOLUTION INTRAVENOUS at 05:28

## 2017-11-15 RX ADMIN — MECLIZINE HYDROCHLORIDE 25 MG: 25 TABLET ORAL at 13:05

## 2017-11-15 RX ADMIN — RABEPRAZOLE SODIUM 20 MG: 20 TABLET, DELAYED RELEASE ORAL at 09:58

## 2017-11-15 RX ADMIN — ENOXAPARIN SODIUM 40 MG: 40 INJECTION SUBCUTANEOUS at 09:59

## 2017-11-15 RX ADMIN — MECLIZINE HYDROCHLORIDE 25 MG: 25 TABLET ORAL at 06:24

## 2017-11-15 NOTE — DISCHARGE SUMMARY
Discharge Summary - [facility] Internal Medicine    Patient Information: Bell Julien 59 y o  female MRN: [id number]  Unit/Bed#: [address] [place] [address] Encounter: [id number]    Discharging Physician / Practitioner: Leeanne Aase, CRNP  PCP: Simone Duran MD  Admission Date: 11/14/2017  Discharge Date: 11/15/17    Reason for Admission: Dizziness    Discharge Diagnoses:     Principal Problem:    Dizziness  Active Problems:    Nausea and vomiting    Hypokalemia    Hypertension    GERD (gastroesophageal reflux disease)    Asthma    Allergic rhinitis    Anxiety  Resolved Problems:    * No resolved hospital problems  *      Consultations During Hospital Stay:  · none    Procedures Performed:   Ct head w/o contrast, PARENCHYMA:  No intracranial mass, mass effect or midline shift  No CT signs of acute infarction  There is no parenchymal hemorrhage  VENTRICLES AND EXTRA-AXIAL SPACES:  Normal for patient's age  VISUALIZED ORBITS AND PARANASAL SINUSES:  Trace mucosal disease in the left sphenoid sinus  No air-fluid levels  Normal orbits  CALVARIUM AND EXTRACRANIAL SOFT TISSUES:  Osteopenia  No fracture or focal lytic/blastic lesions  No acute soft tissue swelling  IMPRESSION: No acute intracranial abnormality      MRI brain w/o contrast, BRAIN PARENCHYMA:  There is no discrete mass, mass effect or midline shift  No abnormal white matter signal identified  Brainstem and cerebellum demonstrate normal signal  There is no intracranial hemorrhage  There is no evidence of acute infarction and  diffusion imaging is unremarkable  VENTRICLES:  The ventricles are normal in size and contour  SELLA AND PITUITARY GLAND:  Normal  ORBITS:  Normal   PARANASAL SINUSES:  Normal  VASCULATURE:  Evaluation of the major intracranial vasculature demonstrates appropriate flow voids   CALVARIUM AND SKULL BASE:  Trace medial left mastoid effusion likely not clinically significant EXTRACRANIAL SOFT TISSUES:  Normal ,IMPRESSION: No significant intracranial abnormalities identified, Trace left mastoid effusion, likely incidental    Significant Findings:     · none    Incidental Findings:   MRI brain w/o contrast, Trace left mastoid effusion    Ct head w/o contrast,   Trace mucosal disease in the left sphenoid sinus    Test Results Pending at Discharge (will require follow up): Vit D 1,25 dihydroxy, pending     Outpatient Tests Requested:  · none    Complications:  none    Hospital Course:     Sydney Zavala is a 59 y o  female patient who originally presented to the hospital on 11/14/2017 with a PMH of  hypertension, GERD, asthma, allergic rhinitis, and anxiety who presents with dizziness with associated symptoms of lightheadedness, nausea and vomiting  CT scan of the brain in the ED was negative  She was admitted and placed on telemetry  Her telemetry monitor showed no arrhythmias  Two troponins were completed, unremarkable  TSH, hemoglobin A1c and vitamin B12 all normal   Lipid profile, cholesterol 173, triglycerides 42,  and LDL 63  MRI brain without contrast, no significant intracranial abnormalities identified, trace left mastoid effusion  She denied any dizziness or lightheadedness overnight  She states she was eating and tolerating her food well  She was instructed to follow up with her ENT and her PCP  She was also instructed if she feels any bulging,tenderness or redness behind her ears to return immediately back to the ED          Condition at Discharge: stable     Discharge Day Visit / Exam:     Subjective:  She is observed lying in bed, offering no complaints of pain or discomfort at present  She denies any dizziness or lightheadedness  She denies chest pain, abdominal pain, or headache  She denies nausea, vomiting, or diarrhea       Vitals: Blood Pressure: 131/77 (11/15/17 1057)  Pulse: 77 (11/15/17 1057)  Temperature: 98 2 °F (36 8 °C) (11/15/17 1057)  Temp Source: Tympanic (11/15/17 1057)  Respirations: 16 (11/15/17 1057)  Height: 5' 5 5" (166 4 cm) (11/14/17 1314)  Weight - Scale: 65 1 kg (143 lb 8 3 oz) (11/14/17 1314)  SpO2: 98 % (11/15/17 0811)  Exam:   Physical Exam   Constitutional: She is oriented to person, place, and time  She appears well-developed and well-nourished  No distress  HENT:   Head: Normocephalic and atraumatic  Neck: Normal range of motion  Neck supple  Cardiovascular: Normal rate, regular rhythm and normal heart sounds  Exam reveals no gallop and no friction rub  No murmur heard  Pulmonary/Chest: Effort normal and breath sounds normal  No respiratory distress  She has no wheezes  She has no rales  She exhibits no tenderness  Abdominal: Soft  Bowel sounds are normal  She exhibits no distension and no mass  There is no tenderness  There is no rebound and no guarding  Musculoskeletal: Normal range of motion  She exhibits no edema, tenderness or deformity  Neurological: She is alert and oriented to person, place, and time  No cranial nerve deficit  Skin: Skin is warm and dry  No rash noted  She is not diaphoretic  No erythema  No pallor  Psychiatric: She has a normal mood and affect  Her behavior is normal  Thought content normal        Discharge instructions/Information to patient and family:   See after visit summary for information provided to patient and family  Provisions for Follow-Up Care:  See after visit summary for information related to follow-up care and any pertinent home health orders  Disposition:     Home    For Discharges to [facility] SNF:   · Not Applicable to this Patient - Not Applicable to this Patient    Planned Readmission:  Not anticipated     Discharge Statement:  I spent 35 minutes discharging the patient  This time was spent on the day of discharge  I had direct contact with the patient on the day of discharge   Greater than 50% of the total time was spent examining patient, answering all patient questions, arranging and discussing plan of care with patient as well as directly providing post-discharge instructions  Additional time then spent on discharge activities  Discharge Medications:  See after visit summary for reconciled discharge medications provided to patient and family  ** Please Note: Dragon 360 Dictation voice to text software may have been used in the creation of this document   **

## 2017-11-15 NOTE — CASE MANAGEMENT
Initial Clinical Review    Admission: Date/Time/Statement: 11/14/17 1223    Orders Placed This Encounter   Procedures    Place in Observation (expected length of stay for this patient is less than two midnights)     Standing Status:   Standing     Number of Occurrences:   1     Order Specific Question:   Admitting Physician     Answer:   Mansfield Cowden     Order Specific Question:   Level of Care     Answer:   Med Surg [16]         ED: Date/Time/Mode of Arrival:   ED Arrival Information     Expected Arrival Acuity Means of Arrival Escorted By Service Admission Type    - 11/14/2017 07:45 Urgent Ambulance [facility] Urgent    Arrival Complaint    NAUSEA/VOMITING          Chief Complaint:   Chief Complaint   Patient presents with    Vomiting     Pt states she was at work and felt dizzy and nauseous  Also vomiting  History of vertigo  Sensitive to movement but not light  History of Illness: 59 y o  female with a PMH including HTN, GERD, asthma, allergic rhinitis, and anxiety who presents with dizziness  States she was in her usual state of health when she took Flonase this morning and felt like the medication 'went in the wrong area' and she subsequently developed dizziness  States her dizziness was associated with lightheadedness, nausea and vomiting  Denies headache, shortness of breath, chest pain, abdominal pain, dysuria, hematuria, or melena  Denies new medications or recent travel       ED Vital Signs:   ED Triage Vitals   Temperature Pulse Respirations Blood Pressure SpO2   11/14/17 0748 11/14/17 0753 11/14/17 0753 11/14/17 0753 11/14/17 0753   (!) 95 7 °F (35 4 °C) 94 16 (!) 172/85 98 %      Temp Source Heart Rate Source Patient Position - Orthostatic VS BP Location FiO2 (%)   11/14/17 0748 11/14/17 0753 11/14/17 0753 11/14/17 0753 --   Tympanic Monitor Sitting Left arm       Pain Score       11/14/17 0753       8        Wt Readings from Last 1 Encounters:   11/14/17 65 1 kg (143 lb 8 3 oz)       Vital Signs (abnormal): Abnormal Labs/Diagnostic Test Results: TROPONIN NEG X2 ,  WC 4 3   CT HEAD No acute intracranial abnormality   ED Treatment:   Medication Administration from 11/14/2017 0745 to 11/14/2017 1311       Date/Time Order Dose Route Action Action by Comments     11/14/2017 0758 ondansetron (ZOFRAN) injection 4 mg 4 mg Intravenous Given Shin Ann RN      11/14/2017 0939 sodium chloride 0 9 % infusion 0 mL/hr Intravenous Stopped Sotero Jimenez RN      11/14/2017 0758 sodium chloride 0 9 % infusion 1,000 mL/hr Intravenous New Bag Shin Ann RN      11/14/2017 1119 meclizine (ANTIVERT) tablet 25 mg 25 mg Oral Given Sotero Jimenez RN      11/14/2017 0820 meclizine (ANTIVERT) tablet 25 mg 25 mg Oral Not Given Sotero Jimenez RN can't swallow or pick head up      11/14/2017 0827 morphine (PF) 4 mg/mL injection 4 mg 4 mg Intravenous Given Sotero Jimenez RN           Past Medical/Surgical History: Active Ambulatory Problems     Diagnosis Date Noted    No Active Ambulatory Problems     Resolved Ambulatory Problems     Diagnosis Date Noted    No Resolved Ambulatory Problems     Past Medical History:   Diagnosis Date    Arthritis        Admitting Diagnosis: Dizziness [R42]  Nausea and vomiting [R11 2]    Age/Sex: 59 y o  female    Assessment/Plan:   Principal Problem:    Dizziness  Active Problems:    Nausea and vomiting    Hypokalemia    Hypertension    GERD (gastroesophageal reflux disease)    Asthma    Allergic rhinitis    Anxiety        Plan for the Primary Problem(s):  Dizziness: likely vertigo   ? Admit to general medicine as observation for further evaluation and treatment   ? Obtain MRI brain   ? Initiate IVF, scheduled Meclizine, and Zofran as needed   ? Ensure electrolytes are optimized  ? Check troponin level, TSH, lipid panel, A1c, Vit B12, folate, Vit D, UA, and EKG   ? Monitor on continuous telemetry monitor  ?  PT/OT eval and treat ? Hold HCTZ     Plan for Additional Problems:   Nausea and Vomiting: Initiate Zofran IV as needed       Hypokalemia: Likely from vomiting  K 3 3  Replete and monitor in am  Check serum magnesium level      Hypertension: Holding HCTZ due to vertigo  Hydralazine IV PRN  Monitor       GERD: Pt would like to bring in home medication, Aciphex       Asthma: Well controlled  C/w Xopenex nebulizer treatments as needed       Allergic Rhinitis: C/w Flonase as needed      Anxiety: No longer taking Buspar  Monitor        VTE Prophylaxis: Enoxaparin (Lovenox)  / sequential compression device   Code Status: FULL CODE  POLST: POLST form is not discussed and not completed at this time      Anticipated Length of Stay:  Patient will be admitted on an Observation basis with an anticipated length of stay of  less than 2 midnights     Justification for Hospital Stay: vertigo, nausea, vomiting     Admission Orders:  OBSERVATION  TELE MON  MRI BRAIN   Scheduled Meds:   enoxaparin 40 mg Subcutaneous Daily   meclizine 25 mg Oral Q8H [facility]   RABEprazole 20 mg Oral Daily     Continuous Infusions:   sodium chloride 75 mL/hr Last Rate: 75 mL/hr (11/15/17 0528)     PRN Meds:   acetaminophen    calcium carbonate    fluticasone    hydrALAZINE    levalbuterol    ondansetron

## 2017-11-15 NOTE — NURSING NOTE
Pt D/C via walking accompanied by RN and spouse  IV D/C and intact  Discharge instructions and Rx given to patient  No further questions at this time  Pt refused and signed the flu and pneumonia vaccinations  Non smoker

## 2017-11-15 NOTE — PLAN OF CARE
DISCHARGE PLANNING     Discharge to home or other facility with appropriate resources Progressing        INFECTION - ADULT     Absence or prevention of progression during hospitalization Progressing     Absence of fever/infection during neutropenic period Progressing        Knowledge Deficit     Patient/family/caregiver demonstrates understanding of disease process, treatment plan, medications, and discharge instructions Progressing        NEUROSENSORY - ADULT     Achieves stable or improved neurological status Progressing        PAIN - ADULT     Verbalizes/displays adequate comfort level or baseline comfort level Progressing        Potential for Falls     Patient will remain free of falls Progressing        RESPIRATORY - ADULT     Achieves optimal ventilation and oxygenation Progressing        SAFETY ADULT     Maintain or return to baseline ADL function Progressing     Maintain or return mobility status to optimal level Progressing     Patient will remain free of falls Progressing

## 2017-11-15 NOTE — DISCHARGE INSTRUCTIONS
If you develop any bulging,tenderness or redness behind her ears please call your PCP or return back to [address] [place] [address] ED    Please make an appointment with your ENT for follow-up within 1-2 weeks  Please keep your scheduled dental appointment

## 2017-11-15 NOTE — SOCIAL WORK
SW referral received to assess needs and assist with DCP  Observation notice signed  DASH discussion completed  Pt resides in a private home with her  Dylan [place])  She is generally independent  Pt does not have any DME  Nor, does she have any home health services in place  Pt uses [facility] on [address] in [place]  Her PCP is Dr Pipe Parisi  Pt does not have a POA, or Living Will  Documents for has been provided  Pt is employed and does drive   will transport at discharge  Pt reported that she does not need any services  SW is available should plans change

## 2017-11-16 LAB — MRSA NOSE QL CULT: NORMAL

## 2017-11-16 NOTE — CASE MANAGEMENT
Notification of Discharge  This is a Notification of Discharge from our facility [address]  Please be advised that this patient has been discharge from our facility  Below you will find the admission and discharge date and time including the patients disposition  PRESENTATION DATE: 11/14/2017  7:45 AM  IP ADMISSION DATE: N/A N/A  DISCHARGE DATE: 11/15/2017  1:35 PM  DISPOSITION: [id number] in the [facility] by Kendall Welch for 2017  Network Utilization Review Department  Phone: [phone]; Fax [phone]  ATTENTION: The Network Utilization Review Department is now centralized for our 7 Facilities  Make a note that we have a new phone and fax numbers for our Department  Please call with any questions or concerns to [phone] and carefully follow the prompts so that you are directed to the right person  All voicemails are confidential  Fax any determinations, approvals, denials, and requests for initial or continue stay review clinical to [phone]  Due to HIGH CALL volume, it would be easier if you could please send faxed requests to expedite your requests and in part, help us provide discharge notifications faster

## 2017-11-17 LAB — 1,25(OH)2D3 SERPL-MCNC: 55.1 PG/ML (ref 19.9–79.3)

## 2017-11-21 ENCOUNTER — ALLSCRIPTS OFFICE VISIT (OUTPATIENT)
Dept: OTHER | Facility: OTHER | Age: 57
End: 2017-11-21

## 2017-12-11 ENCOUNTER — ALLSCRIPTS OFFICE VISIT (OUTPATIENT)
Dept: OTHER | Facility: OTHER | Age: 57
End: 2017-12-11

## 2017-12-11 DIAGNOSIS — M06.09 RHEUMATOID ARTHRITIS OF MULTIPLE SITES WITHOUT RHEUMATOID FACTOR (HCC): ICD-10-CM

## 2018-01-12 NOTE — PROGRESS NOTES
Assessment    1  Rheumatoid arthritis of multiple sites with negative rheumatoid factor (714 0) (M06 09)   2  Primary generalized (osteo)arthritis (715 09) (M15 0)   3  Vitamin D deficiency (268 9) (E55 9)   4  NSAID long-term use (V58 64) (Z79 1)   5  Benign essential hypertension (401 1) (I10)   6  GERD without esophagitis (530 81) (K21 9)    Plan    1  Hydroxychloroquine Sulfate 200 MG Oral Tablet; Take 1 and 1/2 tablet daily as   directed   2  MethylPREDNISolone 4 MG Oral Tablet Therapy Pack; Take as directed on   instruction   3  Follow-up visit in 2 months Evaluation and Treatment  Follow-up  Status: Complete    Done: 32RSN1663    4  Call [phone] if: The pain seems worse ; Status:Complete;   Done: 50JFB8610   5  Call [phone] if: The symptoms seem worse ; Status:Complete;   Done:   33LSF2749   6  Call [phone] if: You have questions or concerns about your problem ;   Status:Complete;   Done: 04NLP5909    7  Advil 200 MG Oral Tablet; TAKE 3 TABLET 4 times daily PRN pain    Discussion/Summary    Ms Gerber did not start on the Plaquenil since her last evaluation, she has significant concerns about taking it with the ibuprofen  She was also unsure if it would worsen her ankle pain  She continues to have persistent left ankle pain and swelling, which has not improved since her last evaluation  She did see her primary care physician and had an x-ray which ruled out any sort of fracture  She also continues to have pain in her bilateral hands, as well as aching in her leg when her ankle is more swollen  She has been wearing an ankle brace at this time without any significant relief in her pain  She is taking up to 13 tablets of ibuprofen daily for her discomfort without significant relief  She does report swelling in her hands and her knees as well  She denies any significant morning stiffness  She does report difficulty sleeping because of pain, nonrestorative sleep, and fatigue   On exam, there is synovitis of the PIPs of the hands, as well as the bilateral ankles, left more so than right  There is also mild increased warmth of the bilateral ankles  No new labs were available for review today  Review of an x-ray report of the left ankle was negative for any underlying fracture  At this time, her rheumatoid arthritis does appear active and uncontrolled despite ibuprofen use  I did explain that her left ankle pain and swelling is likely due to a significant flare of her rheumatoid arthritis involving that joint  This pain and swelling flare was likely precipitated by her twist injury  I did offer her a cortisone injection in the left ankle which she declined  We will therefore treat her with a Medrol Dosepak to see if this improves her pain  This will hopefully also decrease her ibuprofen usage  I did advise her that she needs to start on the Plaquenil at this time, as this will also help with her pain and her swelling  She is agreeable to do so  I instructed her to contact me if she does not have any significant improvement in her pain after taking the Medrol Dosepak  I will reevaluate her in 2 months  She will call in the interim if there are any questions or concerns  Counseling  Rheumatology Counseling Documentation: The patient was counseled regarding instructions for management, impressions and risks and benefits of treatment options  Chief Complaint  F/U RA   Patient is here today for follow up of chronic conditions described in HPI  History of Present Illness  Pt  returns for F/U for RA  Did not take the new medication because of worsening ankle pain  Taking Ibuprofen on a regular basis  Had X-ray which was negative for fracture  Was unsure if HCQ would irritate her stomach more  Taking 13 tablets of Ibuprofen daily for pain  Wearing ankle brace at this time without relief  c/o aching in leg when ankle is swollen  + pain in hands  No other significant joint pain   + swelling in hands and knees  No AM stiffness  + difficulty sleeping due to pain  + non-restorative sleep  + fatigue  RAPID3: 3 8/30      Review of Systems    Constitutional: fatigue, but no fever, no recent weight gain, no chills, no recent weight loss and no anorexia  HEENT: dryness of the eyes and feeling congested, but no blurred vision, no double vision, no amaurosis fugax, no eye pain, no erythema eye(s), no dryness mouth, no mouth sores and no sore throat  Cardiovascular: swelling in the arms or legs, but no chest pain or pressure and no dyspnea on exertion  Respiratory: no unusual or persistent cough, no shortness of breath and no pleurisy  Gastrointestinal: heartburn, but no abdominal pain, no nausea, no vomiting, no diarrhea, no constipation, no melena and no BRBPR  Genitourinary: no foamy urine, but no dysuria and no hematuria  Musculoskeletal: as noted in HPI  Integumentary no rash, no Raynaud's, no alopecia, no nail changes and no photosensitivity  Endocrine no polyuria and no polydipsia  Hematologic/Lymphatic: no unusual bleeding    The patient presents with complaints of a tendency for easy bruising  Symptoms are unchanged  Neurological: tingling, but no headache, no vertigo or dizziness and no weakness  Psychiatric: insomnia and non-restorative sleep  Active Problems    1  Alcohol use (V49 89) (Z78 9)   2  Allergic rhinitis (477 9) (J30 9)   3  Ankle pain, left (719 47) (M25 572)   4  Arthralgia of hand (719 44) (M79 643)   5  Asthma (493 90) (J45 909)   6  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)   7  Benign essential hypertension (401 1) (I10)   8  Encounter for routine gynecological examination (V72 31) (Z01 419)   9  Encounter for screening mammogram for malignant neoplasm of breast (V76 12)   (Z12 31)   10  GERD without esophagitis (530 81) (K21 9)   11  Hyperlipidemia (272 4) (E78 5)   12  Leiomyoma of uterus (218 9) (D25 9)   13  Nervousness (799 21) (R45 0)   14   NSAID long-term use (V58 64) (Z79 1)   15  Pain of thigh, unspecified laterality (729 5) (M79 659)   16  Primary generalized (osteo)arthritis (715 09) (M15 0)   17  Rheumatoid arthritis of multiple sites with negative rheumatoid factor (714 0) (M06 09)   18  Right knee pain (719 46) (M25 561)   19  Soft Tissue Pain In Lower Extremities (729 5)   20  Spider veins (448 1) (I78 1)   21  Sprain of ankle, left (845 00) (S93 402A)   22  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 8) (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 893)   23  Vitamin D deficiency (268 9) (E55 9)    Past Medical History    1  History of Abnormal findings on diagnostic imaging of breast (793 89) (R92 8)   2  Acute sinusitis (461 9) (J01 90)   3  History of Adjustment disorder with depressed mood (309 0) (F43 21)   4  History of Anxiety (300 00) (F41 9)   5  History of Blood tests for routine general physical examination (V72 62) (Z00 00)   6  History of Breast pain (611 71) (N64 4)   7  History of Colonoscopy (Fiberoptic) Screening   8  History of Cough (786 2) (R05)   9  History of Encounter for cervical Pap smear with pelvic exam (V76 2,V72 31) (Z01 419)   10  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)   11  History of Female pelvic pain (625 9) (R10 2)   12  History of  1 (V22 0) (Z34 00)   13  History of polyarthritis (V13 4) (Z87 39)   14  History of self breast exam   15  History of upper respiratory infection (V12 09) (Z87 09)   16  History of uterine leiomyoma (V13 29) (Z86 018)   17  History of Multiple joint pain (719 49) (M25 50)   18  History of Need for prophylactic vaccination and inoculation against influenza (V04 81)    (Z23)   19  History of Pain in joint of left shoulder (719 41) (M25 512)   20  History of Pain in wrist, unspecified laterality (719 43) (M25 539)   21  History of Sprained Ribs (848 3)   22  Tick bite of neck (910 4,E906 4) (X56 81SQ,K02  XXXA)   23  Upper respiratory infection (465 9) (J06 9)    The active problems and past medical history were reviewed and updated today  Surgical History    1  History of Biopsy Breast Percutaneous Needle Core   2  History of Dental Surgery   3  History of Dilation And Curettage   4  History of Hysteroscopy   5  History of Neuroplasty Median Nerve At Carpal Tunnel   6  History of Surgical Treatment For    7  History of Tonsillectomy    The surgical history was reviewed and updated today  Family History  Mother    1  Family history of Coronary Artery Disease (V17 49)   2  Family history of skin cancer (V16 8) (Z80 8)   3  Family history of varicose veins (V17 49) (Z82 49)   4  Family history of Hypertension (V17 49)   5  Family history of Uterine Cancer (V16 49)  Father    6  Family history of Coronary Artery Disease (V17 49)   7  Family history of Hypertension (V17 49)   8  Family history of Stent Indications  Sister    5  Family history of colitis (V18 59) (Z83 79)  Maternal Grandmother    10  Family history of rheumatoid arthritis (V17 7) (Z82 61)  Family History    11  Denied: Family history of Crohn's disease   12  Denied: Family history of psoriasis   13  Denied: Family history of systemic lupus erythematosus   14  Denied: Family history of ulcerative colitis    The family history was reviewed and updated today  Social History    · Alcohol use (V49 89) (Z78 9)   · Alcohol Use (History)   · Always uses seat belt   · Caffeine use (V49 89) (F15 90)   · Denied: History of Drug Use   · Denied: History of    ·    · Never a smoker   · No drug use   · [employer]  The social history was reviewed and updated today  The social history was reviewed and is unchanged  Current Meds   1  Aciphex 20 MG Oral Tablet Delayed Release; TAKE 1 TABLET TWICE DAILY  Requested   for: 18OEB3973; Last Rx:95Ifd0920 Ordered   2  Advil 200 MG Oral Tablet; TAKE 3 TABLET 4 times daily PRN pain; Therapy: (Recorded:2016) to Recorded   3   BusPIRone HCl - 15 MG Oral Tablet; 1/2 tab by mouth twice a day; Last Rx:31Oct2014   Ordered   4  Elidel 1 % External Cream; APPLY AS DIRECTED bid; Therapy: 46HEH7041 to Recorded   5  Fluticasone Propionate 50 MCG/ACT Nasal Suspension; USE 2 SPRAYS IN EACH   NOSTRIL ONCE DAILY; Therapy: 80JWY7936 to (Last ZP:93WMM1285)  Requested for: 00AVJ2993 Ordered   6  Hydrochlorothiazide 25 MG Oral Tablet; TAKE 1 TABLET DAILY  Requested for:   93QRH3631; Last Rx:17Yuk4447 Ordered   7  Multi-Vitamin Gummies CHEW; take 2 tablet daily; Therapy: (Recorded:78Wcg9843) to Recorded   8  Xopenex HFA 45 MCG/ACT Inhalation Aerosol; INHALE 2 PUFFS EVERY 4-6 HOURS AS   NEEDED  Requested for: 88RHM9812; Last Rx:97Kcl3140 Ordered    The medication list was reviewed and updated today  Immunizations  Influenza --- Cary Janae: Temporarily Deferred: Pt requests deferral   Tdap --- Cary Janae: Apr 2007     Allergies    1  No Known Drug Allergies    2  No Known Environmental Allergies   3  No Known Food Allergies    Vitals  Signs [Data Includes: Current Encounter]   Recorded: [id number] 03:46PM   Heart Rate: 76  Systolic: 328  Diastolic: 80  Weight: 754 lb   BMI Calculated: 25 26  BSA Calculated: 1 71    Physical Exam    Constitutional   General appearance: No acute distress, well appearing and well nourished  Eyes   Conjunctiva and lids: No swelling, erythema or discharge  Pupils and irises: Equal, round and reactive to light  Ears, Nose, Mouth, and Throat   External inspection of ears and nose: Normal     Oropharynx: Abnormal   (+ poor dentition) Oral mucosa was dry  Pulmonary   Respiratory effort: No increased work of breathing or signs of respiratory distress  Auscultation of lungs: Clear to auscultation  Cardiovascular   Auscultation of heart: Normal rate and rhythm, normal S1 and S2, without murmurs  Examination of extremities for edema and/or varicosities: Abnormal   varicosities noted bilaterally  Lymphatic   Palpation of lymph nodes in neck: No lymphadenopathy  Psychiatric   Orientation to person, place, and time: Normal     Mood and affect: Normal       Right thumb MCP swelling  Right glenohumeral joint tenderness and restricted ROM  Left thumb MCP swelling  Left glenohumeral joint tenderness and restricted ROM  Right ankle tenderness and restricted ROM  Left ankle tenderness and swelling  Right Upper Extremity: Right Hand: Right Hand Appearance: Heberden's nodule at the all DIPs digit and ulnar deviation  Right Wrist: Right Elbow: Right Shoulder:   Left Upper Extremity: Left Hand: Appearance: all DIPs digit(s) Heberden's Node(s) and ~Udigit(s) ulnar deviation  Left Wrist: Left Elbow: Left Shoulder:   Musculoskeletal - Joints, bones, and muscles: Abnormal  Palpation - bilateral ankle increased warmth and left knee crepitus  Skin - Skin and subcutaneous tissue: Normal    Neurologic - Sensation: Normal      The patient has tenderness in all MCP and PIP joints of the right hand  The patient has tenderness in all MCP and PIP joints of the left hand  The patient has swelling of all PIP joints of the right hand  The patient has swelling of all PIP joints of the left hand  Results/Data  Results   * XR ANKLE 3+ VIEW LEFT [id number] 08:59AM Brooke Collazo    Order Number: [id number]     Test Name Result Flag Reference   XR ANKLE 3+ VW LEFT (Report)     LEFT ANKLE     INDICATION: Left lateral ankle pain and swelling after twisting injury  COMPARISON: 10/13/2008     VIEWS: 3; 3 images     FINDINGS:     There is no acute fracture or dislocation  No degenerative changes  No lytic or blastic lesions are seen  There is soft tissue swelling over the lateral malleolus  IMPRESSION:     No acute osseous abnormality  Workstation performed: [id number]     Signed by:   Mehdi Ocampo MD   4/8/16       Health Management  Benign essential hypertension   EKG/ECG- POC; every 1 year; Last 92ETJ6580; Next Due: 58EMS4443;  Active  Encounter for routine gynecological examination   (every) THINPREP PAP; every 2 years; Last 28MHS0337; Next Due: 17WHB1668; Overdue  Encounter for screening mammogram for malignant neoplasm of breast   DIGTL SCRN MAMMO W/CAD; every 1 year; Last 99GYT9264; Next Due: 89UCQ2658; Active  History of Colonoscopy (Fiberoptic) Screening   COLONOSCOPY; every 10 years; Last 86Isk1452; Next Due: 29ZWB8513; Active    Future Appointments    Date/Time Provider Specialty Site   06/28/2016 05:00 PM DIANNA Modi [address] [place] [address]   08/04/2016 03:40 PM Yevgeniy Armendariz DO Rheumatology [facility] [address] [facility]     Signatures   Electronically signed by : Martha Reynolds DO; Jun 7 2016  5:22PM EST                       (Author)

## 2018-01-12 NOTE — PROGRESS NOTES
Assessment    1  Rheumatoid arthritis of multiple sites with negative rheumatoid factor (714 0) (M06 09)   2  Vitamin D deficiency (268 9) (E55 9)   3  Primary generalized (osteo)arthritis (715 09) (M15 0)   4  NSAID long-term use (V58 64) (Z79 1)   5  Benign essential hypertension (401 1) (I10)   6  Hyperlipidemia (272 4) (E78 5)   7  GERD without esophagitis (530 81) (K21 9)   8  Alcohol use (V49 89) (Z78 9)    Plan    1  Hydroxychloroquine Sulfate 200 MG Oral Tablet; TAKE 1 and 1/2 TABLETS Daily   With Food   2  Call [phone] if: The pain seems worse ; Status:Complete;   Done: 07Apr2016   3  Call [phone] if: The symptoms seem worse ; Status:Complete;   Done:   07Apr2016   4  Call [phone] if: You have questions or concerns about your problem ;   Status:Complete;   Done: 07Apr2016   5  Follow-up visit in 2 months Evaluation and Treatment  Follow-up  Status: Complete    Done: 07Apr2016    Discussion/Summary    Ms Espinal has had some improvement in her hand pain since starting on the vitamin D supplementation  She continues to have some discomfort, which is relieved well with the Advil  She is also noted some significant left ankle pain and swelling, she does report that she recently twisted several months ago, but it has not improved over time  She has noted swelling in her bilateral hands and knees, as well as the left ankle  She does report some mild morning stiffness typically lasting several minutes in duration  She reports occasional difficulty sleeping because of pain, nonrestorative sleep, and fatigue  On exam, there is mild synovitis of the PIPs of her hands  She does have chronic rheumatoid deformities of the bilateral hands  There is mild synovitis of the bilateral knees, as well as the left ankle  The left ankle is significantly tender to palpation   Review of laboratory studies revealed negative connective-tissue disease serologies, a normal ESR and CRP, a normal TSH, but a decreased vitamin D level  At this time, her history and exam to appear consistent with an inflammatory arthropathy, likely a seronegative rheumatoid arthritis given her laboratory studies  We did discuss several treatment options, and we have opted to start Plaquenil 300 mg daily  I did instruct her on the need for an annual eye exam while taking this medication  She will continue the vitamin D supplementation since this is been helping with her arthralgias  I will reevaluate her in 2 months  She will call in interim if there are any questions or concerns  Counseling  Rheumatology Counseling Documentation: The patient was counseled regarding diagnostic results, instructions for management, impressions and risks and benefits of treatment options  The following educational handouts were provided: RA and Hydroxychloroquine  Chief Complaint  F/U polyarthritis   Patient is here today for follow up of chronic conditions described in HPI  History of Present Illness  Pt  returns for F/U for polyarthritis  Hands are feeling a little better since starting Vitamin D  Still with some discomfort despite this  Pain is intermittent  Taking Advil as needed for pain with good relief  Also with some L ankle pain -> recently twisted it  + swelling in knees and hands  + AM stiffness x several minutes  + occasional difficulty sleeping due to pain  + non-restorative sleep  + fatigue  RAPID3: 4 3/30      Review of Systems    Constitutional: recent 5 lb weight gain and fatigue, but no fever, no chills, no recent weight loss and no anorexia  HEENT: dryness of the eyes, eye pain and feeling congested, but no blurred vision, no double vision, no amaurosis fugax, no erythema eye(s), no dryness mouth, no mouth sores and no sore throat  Cardiovascular: swelling in the arms or legs, but no chest pain or pressure and no dyspnea on exertion  Respiratory: no unusual or persistent cough, no shortness of breath and no pleurisy  Gastrointestinal: heartburn, but no abdominal pain, no nausea, no vomiting, no diarrhea, no constipation, no melena and no BRBPR  Genitourinary: no foamy urine, but no dysuria and no hematuria  Musculoskeletal: as noted in HPI  Integumentary photosensitivity, but no rash, no Raynaud's, no alopecia and no nail changes  Endocrine no polyuria and no polydipsia  Hematologic/Lymphatic: no unusual bleeding    The patient presents with complaints of a tendency for easy bruising Symptoms are unchanged (2/2 meds)  Neurological: no headache, no vertigo or dizziness, no tingling and no weakness  Psychiatric: insomnia and non-restorative sleep  Active Problems    1  Allergic rhinitis (477 9) (J30 9)   2  Arthralgia of hand (719 44) (M79 643)   3  Asthma (493 90) (J45 909)   4  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)   5  Benign essential hypertension (401 1) (I10)   6  GERD without esophagitis (530 81) (K21 9)   7  Hyperlipidemia (272 4) (E78 5)   8  Leiomyoma of uterus (218 9) (D25 9)   9  Nervousness (799 21) (R45 0)   10  NSAID long-term use (V58 64) (Z79 1)   11  Pain of thigh, unspecified laterality (729 5) (M79 659)   12  Primary generalized (osteo)arthritis (715 09) (M15 0)   13  Right knee pain (719 46) (M25 561)   14  Soft Tissue Pain In Lower Extremities (729 5)   15  Spider veins (448 1) (I78 1)   16  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 8) (I83 893)   17  Vitamin D deficiency (268 9) (E55 9)    Past Medical History    1  History of Abnormal findings on diagnostic imaging of breast (793 89) (R92 8)   2  Acute sinusitis (461 9) (J01 90)   3  History of Adjustment disorder with depressed mood (309 0) (F43 21)   4  History of Anxiety (300 00) (F41 9)   5  History of Blood tests for routine general physical examination (V72 62) (Z00 00)   6  History of Breast pain (611 71) (N64 4)   7  History of Colonoscopy (Fiberoptic) Screening   8  History of Cough (786 2) (R05)   9   History of Encounter for cervical Pap smear with pelvic exam (V76 2,V72 31) (Z01 419)   10  History of Encounter for routine gynecological examination (V72 31) (Z01 419)   11  History of Encounter for screening mammogram for malignant neoplasm of breast    (V76 12) (Z12 31)   12  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)   13  History of Female pelvic pain (625 9) (R10 2)   14  History of  1 (V22 0) (Z34 00)   15  History of polyarthritis (V13 4) (Z87 39)   16  History of self breast exam   17  History of upper respiratory infection (V12 09) (Z87 09)   18  History of uterine leiomyoma (V13 29) (Z86 018)   19  History of Multiple joint pain (719 49) (M25 50)   20  History of Need for prophylactic vaccination and inoculation against influenza (V04 81)    (Z23)   21  History of Pain in joint of left shoulder (719 41) (M25 512)   22  History of Pain in wrist, unspecified laterality (719 43) (M25 539)   23  History of Sprained Ribs (848 3)   24  Tick bite of neck (910 4,E906 4) (R41 84RW,F49  XXXA)   25  Upper respiratory infection (465 9) (J06 9)    The active problems and past medical history were reviewed and updated today  Surgical History    1  History of Biopsy Breast Percutaneous Needle Core   2  History of Dental Surgery   3  History of Dilation And Curettage   4  History of Hysteroscopy   5  History of Neuroplasty Median Nerve At Carpal Tunnel   6  History of Surgical Treatment For    7  History of Tonsillectomy    The surgical history was reviewed and updated today  Family History    1  Family history of Coronary Artery Disease (V17 49)   2  Family history of skin cancer (V16 8) (Z80 8)   3  Family history of varicose veins (V17 49) (Z82 49)   4  Family history of Hypertension (V17 49)   5  Family history of Uterine Cancer (V16 49)    6  Family history of Coronary Artery Disease (V17 49)   7  Family history of Hypertension (V17 49)   8  Family history of Stent Indications    9   Family history of colitis (V18 59) (Z83 79)    10  Family history of rheumatoid arthritis (V17 7) (Z82 61)    11  Denied: Family history of Crohn's disease   12  Denied: Family history of psoriasis   13  Denied: Family history of systemic lupus erythematosus   14  Denied: Family history of ulcerative colitis    The family history was reviewed and updated today  Social History    · Alcohol use (V49 89) (Z78 9)   · Alcohol Use (History)   · Always uses seat belt   · Caffeine use (V49 89) (F15 90)   · Denied: History of Drug Use   · Denied: History of    ·    · Never A Smoker   · No drug use   · Jain  The social history was reviewed and updated today  The social history was reviewed and is unchanged  Current Meds   1  Aciphex 20 MG Oral Tablet Delayed Release; TAKE 1 TABLET TWICE DAILY  Requested   for: 49OXZ9876; Last Rx:34Fag4743 Ordered   2  Advil 200 MG Oral Tablet; TAKE 2 TABLET 3 times daily PRN pain; Therapy: (Recorded:26Mjc4283) to Recorded   3  BusPIRone HCl - 15 MG Oral Tablet; 1/2 tab by mouth twice a day; Last Rx:2014   Ordered   4  Elidel 1 % External Cream; APPLY AS DIRECTED bid; Therapy: 82WYF2173 to Recorded   5  Fluticasone Propionate 50 MCG/ACT Nasal Suspension; USE 2 SPRAYS IN EACH   NOSTRIL ONCE DAILY; Therapy: 74SDH3349 to (Last TF:62JOE7130)  Requested for: 45FSR9914 Ordered   6  Hydrochlorothiazide 25 MG Oral Tablet; TAKE 1 TABLET DAILY  Requested for:   23HNX2126; Last Rx:2015 Ordered   7  Multi-Vitamin Gummies CHEW; take 2 tablet daily; Therapy: (Recorded:41Mdj5643) to Recorded   8  Ocean Nasal Spray 0 65 % Nasal Solution; Irrigate your nose with 2 sprays twice a day; Therapy: 50ZIU9939 to (Last Rx:2015) Ordered   9  Xopenex HFA 45 MCG/ACT Inhalation Aerosol; INHALE 2 PUFFS EVERY 4-6 HOURS AS   NEEDED  Requested for: 34LTS8350; Last Rx:2015 Ordered    The medication list was reviewed and updated today        Immunizations  Influenza --- Blanca Lyles: Temporarily Deferred: Pt requests deferral   Tdap --- Teresa Mcfarlane: Apr 2007     Allergies    1  No Known Drug Allergies    Vitals  Signs [Data Includes: Current Encounter]   Recorded: 41HVZ8179 03:45PM   Heart Rate: 78  Systolic: 097  Diastolic: 80  Weight: 613 lb   BMI Calculated: 25 43  BSA Calculated: 1 71    Physical Exam    Constitutional   General appearance: No acute distress, well appearing and well nourished  Eyes   Conjunctiva and lids: No swelling, erythema or discharge  Pupils and irises: Equal, round and reactive to light  Ears, Nose, Mouth, and Throat   External inspection of ears and nose: Normal     Oropharynx: Abnormal   (+ poor dentition) Oral mucosa was dry  Pulmonary   Respiratory effort: No increased work of breathing or signs of respiratory distress  Auscultation of lungs: Clear to auscultation  Cardiovascular   Auscultation of heart: Normal rate and rhythm, normal S1 and S2, without murmurs  Examination of extremities for edema and/or varicosities: Abnormal   varicosities noted bilaterally  Lymphatic   Palpation of lymph nodes in neck: No lymphadenopathy  Psychiatric   Orientation to person, place, and time: Normal     Mood and affect: Normal       Right thumb MCP swelling  Right glenohumeral joint tenderness and restricted ROM  Left thumb MCP swelling  Left glenohumeral joint tenderness and restricted ROM  Right Upper Extremity: Right Hand: Right Hand Appearance: Heberden's nodule at the all DIPs digit and ulnar deviation  Right Wrist: Right Elbow: Right Shoulder:   Left Upper Extremity: Left Hand: Appearance: all DIPs digit(s) Heberden's Node(s) and ~Udigit(s) ulnar deviation  Left Wrist: Left Elbow: Left Shoulder:   Musculoskeletal - Joints, bones, and muscles: Abnormal  Palpation - left knee crepitus  Skin - Skin and subcutaneous tissue: Normal    Neurologic - Sensation: Normal      The patient has tenderness in all MCP and PIP joints of the right hand     The patient has tenderness in all MCP and PIP joints of the left hand  The patient has swelling of all PIP joints of the right hand  The patient has swelling of all PIP joints of the left hand  Results/Data  Results   (1) HIV AG/AB COMBO, 4TH GEN [id number] 07:59AM Niels Vann     Test Name Result Flag Reference   HIV AG/AB, 4TH GEN NON-REACTIVE  NON-REACTIVE   A Nonreactive HIV Ag/Ab result does not exclude  HIV infection since the time frame for seroconversion  is variable  If acute HIV infection is suspected,  a HIV-1 RNA Qualitative TMA test is recommended  PLEASE NOTE: This information has been disclosed to you  from records whose confidentiality may be protected by  state law  If your state requires such protection, then  the state law prohibits you from making any further  disclosure of the information without the specific  written consent of the person to whom it pertains, or  as otherwise permitted by law  A general authorization  for the release of medical or other information is NOT  sufficient for this purpose  The performance of this assay has not been clinically  validated in patients less than 3years old  For additional information please refer to  http://JuicyCanvas/faq/NMQ498  (This link is being provided for informational/  educational purposes only )     (1) COMPREHENSIVE METABOLIC PANEL [id number] 71:15VW Niels Mon     Test Name Result Flag Reference   GLUCOSE 92 mg/dL  65-99   Fasting reference interval   UREA NITROGEN (BUN) 11 mg/dL  7-25   CREATININE 0 78 mg/dL  0 50-1 05   For patients >52years of age, the reference limit  for Creatinine is approximately 13% higher for people  identified as -American  eGFR NON-AFR   AMERICAN 84 mL/min/1 73m2  > OR = 60   eGFR AFRICAN AMERICAN 97 mL/min/1 73m2  > OR = 60   BUN/CREATININE RATIO   3-52   NOT APPLICABLE (calc)   SODIUM 138 mmol/L  135-146   POTASSIUM 3 7 mmol/L  3 5-5 3   CHLORIDE 98 mmol/L     CARBON DIOXIDE 28 mmol/L  19-30   CALCIUM 9 7 mg/dL  8 6-10 4   PROTEIN, TOTAL 6 8 g/dL  6 1-8 1   ALBUMIN 4 5 g/dL  3 6-5 1   GLOBULIN 2 3 g/dL (calc)  1 9-3 7   ALBUMIN/GLOBULIN RATIO 2 0 (calc)  1 0-2 5   BILIRUBIN, TOTAL 0 7 mg/dL  0 2-1 2   ALKALINE PHOSPHATASE 69 U/L     AST 22 U/L  10-35   ALT 18 U/L  6-29     (1) HLA B27 94Imd5642 07:59AM Carleene Laundry     Test Name Result Flag Reference   HLA-B27 ANTIGEN NEGATIVE  NEGATIVE     (Q) SED RATE BY MODIFIED WESTERGREN [id number] 07:59AM Carleene Laundry     Test Name Result Flag Reference   SED RATE BY MODIFIED$WESTERGREN 2 mm/h  < OR = 30     (1) CBC/PLT/DIFF [id number] 07:59AM Carleene Laundry     Test Name Result Flag Reference   WHITE BLOOD CELL COUNT 3 6 Thousand/uL L 3 8-10 8   RED BLOOD CELL COUNT 4 13 Million/uL  3 80-5 10   HEMOGLOBIN 13 7 g/dL  11 7-15 5   HEMATOCRIT 42 0 %  35 0-45 0    6 fL H 80 0-100 0   MCH 33 2 pg H 27 0-33 0   MCHC 32 6 g/dL  32 0-36 0   RDW 12 6 %  11 0-15 0   PLATELET COUNT 061 Thousand/uL  140-400   MPV 8 2 fL  7 5-11 5   ABSOLUTE NEUTROPHILS 2034 cells/uL  8193-6891   ABSOLUTE LYMPHOCYTES 1094 cells/uL  850-3900   ABSOLUTE MONOCYTES 299 cells/uL  200-950   ABSOLUTE EOSINOPHILS 133 cells/uL     ABSOLUTE BASOPHILS 40 cells/uL  0-200   NEUTROPHILS 56 5 %     LYMPHOCYTES 30 4 %     MONOCYTES 8 3 %     EOSINOPHILS 3 7 %     BASOPHILS 1 1 %       (Q) ESTEBAN IFA SCREEN W/REFL TO TITER AND PATTERN, IFA [id number] 07:59AM Carleene Laundry     Test Name Result Flag Reference   ESTEBAN SCREEN, IFA NEGATIVE  NEGATIVE     (Q) CYCLIC CITRULLINATED PEPTIDE (CCP) AB (IGG) [id number] 07:59AM Carleene Laundry     Test Name Result Flag Reference   CYCLIC CITRULLINATED$PEPTIDE (CCP) AB (IGG) <16 UNITS     Reference Range  Negative:            <20  Weak Positive:       20-39  Moderate Positive:   40-59  Strong Positive:     >59     (1) RF QUANTITATION [id number] 07:59AM Italo Laundry     Test Name Result Flag Reference   RHEUMATOID FACTOR 8 IU/mL  <14     (1) C-REACTIVE PROTEIN 47Pwp2227 07:59AM Emelina Cure     Test Name Result Flag Reference   C-REACTIVE PROTEIN 0 13 mg/dL  <0 80   Please be advised that patients taking Carboxypenicillins  may exhibit falsely decreased C-Reactive Protein levels  due to an analytical interference in this assay  (Q) SJOGREN'S ANTIBODIES (SS-A,SS-B) [id number] 07:59AM Emelina Cure     Test Name Result Flag Reference   SJOGREN'S ANTIBODY (SS-A) <1 0 NEG AI  <1 0 NEG   SJOGREN'S ANTIBODY (SS-B) <1 0 NEG AI  <1 0 NEG     *(Q) VITAMIN D, 25-HYDROXY, LC/MS/MS 65Iko6977 07:59AM Emelina Cure     Test Name Result Flag Reference   VITAMIN D, 25-OH, TOTAL 24 ng/mL L    Vitamin D Status         25-OH Vitamin D:     Deficiency:                    <20 ng/mL  Insufficiency:             20 - 29 ng/mL  Optimal:                 > or = 30 ng/mL     For 25-OH Vitamin D testing on patients on   D2-supplementation and patients for whom quantitation   of D2 and D3 fractions is required, the QuestAssureD(TM)  25-OH VIT D, (D2,D3), LC/MS/MS is recommended: order   code 79484 (patients >2yrs)  For more information on this test, go to:  http://[employer]ful/faq/TGE945  (This link is being provided for   informational/educational purposes only )     (Q) TSH, 3RD GENERATION W/REFLEX TO FT4 [id number] 07:59AM Emelina Cure     Test Name Result Flag Reference   TSH W/REFLEX TO FT4 0 96 mIU/L  0 40-4 50       Health Management  Benign essential hypertension   EKG/ECG- POC; every 1 year; Last 05BTS3647; Next Due: 83VPB0040; Active  History of Colonoscopy (Fiberoptic) Screening   COLONOSCOPY; every 10 years; Last 48Umo3856; Next Due: 35PHP9567; Active    Future Appointments    Date/Time Provider Specialty Site   04/08/2016 08:00 AM DIANNA Medley  [place] [id number]   05/10/2016 05:00 PM DIANNA Medley   Family Medicine [address] [place] [address] Drive   06/07/2016 03:40 PM Tarah Zee DO Rheumatology [facility] [address] [facility]     Signatures   Electronically signed by : Iona Reilly DO;  Apr 7 2016  9:14PM EST                       (Author)

## 2018-01-13 VITALS
SYSTOLIC BLOOD PRESSURE: 124 MMHG | HEIGHT: 64 IN | BODY MASS INDEX: 24.01 KG/M2 | DIASTOLIC BLOOD PRESSURE: 82 MMHG | HEART RATE: 72 BPM | WEIGHT: 140.6 LBS | RESPIRATION RATE: 16 BRPM

## 2018-01-14 VITALS
RESPIRATION RATE: 18 BRPM | SYSTOLIC BLOOD PRESSURE: 132 MMHG | HEART RATE: 84 BPM | WEIGHT: 145.2 LBS | HEIGHT: 64 IN | BODY MASS INDEX: 24.79 KG/M2 | DIASTOLIC BLOOD PRESSURE: 84 MMHG

## 2018-01-15 NOTE — PROGRESS NOTES
Assessment    1  Rheumatoid arthritis of multiple sites with negative rheumatoid factor (714 0) (M06 09)   2  Primary generalized (osteo)arthritis (715 09) (M15 0)   3  Vitamin D deficiency (268 9) (E55 9)   4  NSAID long-term use (V58 64) (Z79 1)   5  Benign essential hypertension (401 1) (I10)   6  GERD without esophagitis (530 81) (K21 9)   7  Long-term use of hydroxychloroquine (V58 69) (Z79 899)   8  Left ankle sprain (845 00) (S93 402A)    Plan    1  1 - Ken SPENCER, Lori Carcamo  (Orthopedic Surgery) Physician Referral  Consult- Please   evaluate and treat for left ankle sprain for several months, persistent swelling and pain  Thank you! Status: Active  Requested for: 29Wwa3146  Care Summary provided  : Yes    2  Hydroxychloroquine Sulfate 200 MG Oral Tablet; TAKE 1 TABLET DAILY WITH   FOOD   3  Follow-up visit in 2 months Evaluation and Treatment  Follow-up  Status: Hold For -   Scheduling  Requested for: 46Ugw5250   4  (1) CBC/PLT/DIFF; Status:Active; Requested for:10Auy5704;    5  (1) COMPREHENSIVE METABOLIC PANEL; Status:Active; Requested for:86Sog7445;    6  (1) C-REACTIVE PROTEIN; Status:Active; Requested for:08Asc1689;    7  (1) SED RATE; Status:Active; Requested SIE:05WOI5501;     8  Call [phone] if: The pain seems worse ; Status:Complete;   Done: 55RDN1163   9  Call [phone] if: The symptoms seem worse ; Status:Complete;   Done:   30NNU4960   10  Call [phone] if: You have questions or concerns about your problem ;    Status:Complete;   Done: 83IHK0421    Discussion/Summary    This is a 57-year-old female presenting today for follow-up for rheumatoid arthritis  Patient states that she's been feeling well however she does continue to have discomfort in her hands  She also describes swelling in her hands and occasional swelling in the knees as well  She also continues to have persistent pain and swelling in the left ankle due to an ankle sprain that occurred in December   She was treated with a course of prednisone at the last appointment which helped significantly with her pain  She denies any further obvious joint swelling however does describe morning stiffness lasting several minutes  She does have difficulty with sleep due to pain mostly related to her activity throughout the day  She does describe non-restorative sleep as well as fatigue  She currently utilizes hydroxychloroquine however has only been taking one 200 mg tablet daily due to nausea when increasing the medication  She has found that this medication has helped with her pain  She is currently waiting to see an orthopedic for evaluation of her left ankle  She also has plans to schedule an eye exam but has no appointment at this time  On physical exam, patient does have tenderness of the MCPs and PIPs bilaterally as well as synovitis of the PIPs bilaterally  She also has tenderness of bilateral shoulders as well as the left ankle which does appear to be swollen and warm to the touch  Patient has crepitus of bilateral knees  Patient did not have any lab since her last appointment  At this time patient's history and physical is most consistent with rheumatoid arthritis which appears to be mildly active at this time  Patient would like to continue with hydroxychloroquine 200 mg daily at this time to prevent any further symptoms of nausea  We will plan to see patient back in the office in 3 months time and we will repeat a CBC, CMP, CRP, and ESR prior to that appointment  Patient was given a referral to see Dr Alexandra Vaughan for evaluation of her left ankle pain and swelling  Patient will call in the interim if she has any further questions or concerns  The patient was counseled regarding instructions for management, prognosis, patient and family education, risks and benefits of treatment options, importance of compliance with treatment        Chief Complaint  F/U RA   Patient is here today for follow up of chronic conditions described in HPI  History of Present Illness  Patient is in the office today for follow up for RA  Feeling good at this time  Pain in the hands at this time  Swelling in both hands and some knees swelling  No other obvious joint swelling  +Am stiffness x few minutes once she gets moving  +Some difficulty sleeping due to pain related to activity  +Non restorative sleep  +Fatigue  HCQ 1 tablet po daily due to nausea  Advil for pain as needed with relief  Off Prednisone helped left ankle significantly  To see Dr Zeke Webber for ankle pain recommended by PCP  Eye exam coming up, no appt at this time  RAPID3: 1 5 /30      Review of Systems    Constitutional: fatigue, but no fever, no recent weight gain, no chills, no recent weight loss and no anorexia  HEENT: +flashes of light in right eye, dryness of the eyes, eye pain and erythema eye(s), but no blurred vision, no double vision, no amaurosis fugax, no dryness mouth, no mouth sores, not feeling congested and no sore throat  Cardiovascular: swelling in the arms or legs, but no chest pain or pressure and no dyspnea on exertion  Respiratory: no unusual or persistent cough, no shortness of breath and no pleurisy  Gastrointestinal: heartburn, but no abdominal pain, no nausea, no vomiting, no diarrhea, no constipation, no melena and no BRBPR  Genitourinary: No foamy urine, but no dysuria and no hematuria  Musculoskeletal: as noted in HPI  Integumentary no rash, no Raynaud's, no alopecia, no nail changes and no photosensitivity  Endocrine no polyuria and no polydipsia  Hematologic/Lymphatic: no unusual bleeding    The patient presents with complaints of a tendency for easy bruising  Symptoms are unchanged  Neurological: no headache, no vertigo or dizziness, no tingling and no weakness  Psychiatric: insomnia and non-restorative sleep  Active Problems    1  Alcohol use (V49 89) (Z78 9)   2  Allergic rhinitis (477 9) (J30 9)   3   Ankle pain, left (719 47) (M25 572)   4  Arthralgia of hand (719 44) (M79 643)   5  Asthma (493 90) (J45 909)   6  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)   7  Benign essential hypertension (401 1) (I10)   8  Cellulitis of leg, left (682 6) (L03 116)   9  Encounter for routine gynecological examination (V72 31) (Z01 419)   10  Encounter for screening mammogram for malignant neoplasm of breast (V76 12)    (Z12 31)   11  GERD without esophagitis (530 81) (K21 9)   12  Hyperlipidemia (272 4) (E78 5)   13  Left ankle sprain (845 00) (S93 402A)   14  Leiomyoma of uterus (218 9) (D25 9)   15  Nervousness (799 21) (R45 0)   16  NSAID long-term use (V58 64) (Z79 1)   17  Pain of thigh, unspecified laterality (729 5) (M79 659)   18  Primary generalized (osteo)arthritis (715 09) (M15 0)   19  Rheumatoid arthritis of multiple sites with negative rheumatoid factor (714 0) (M06 09)   20  Right knee pain (719 46) (M25 561)   21  Soft Tissue Pain In Lower Extremities (729 5)   22  Spider veins (448 1) (I78 1)   23  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 8) (I83 893)   24  Vitamin D deficiency (268 9) (E55 9)    Past Medical History    1  History of Abnormal findings on diagnostic imaging of breast (793 89) (R92 8)   2  Acute sinusitis (461 9) (J01 90)   3  History of Adjustment disorder with depressed mood (309 0) (F43 21)   4  History of Anxiety (300 00) (F41 9)   5  History of Blood tests for routine general physical examination (V72 62) (Z00 00)   6  History of Breast pain (611 71) (N64 4)   7  History of Colonoscopy (Fiberoptic) Screening   8  History of Cough (786 2) (R05)   9  History of Encounter for cervical Pap smear with pelvic exam (V76 2,V72 31) (Z01 419)   10  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)   11  History of Female pelvic pain (625 9) (R10 2)   12  History of  1 (V22 0) (Z34 00)   13  History of polyarthritis (V13 4) (Z87 39)   14  History of self breast exam   15   History of upper respiratory infection (V12 09) (Z87 09)   16  History of uterine leiomyoma (V13 29) (Z86 018)   17  History of Multiple joint pain (719 49) (M25 50)   18  History of Need for prophylactic vaccination and inoculation against influenza (V04 81)    (Z23)   19  History of Pain in joint of left shoulder (719 41) (M25 512)   20  History of Pain in wrist, unspecified laterality (719 43) (M25 539)   21  History of Sprained Ribs (848 3)   22  Tick bite of neck (910 4,E906 4) (M64 95SJ,U20  XXXA)   23  Upper respiratory infection (465 9) (J06 9)    The active problems and past medical history were reviewed and updated today  Surgical History    1  History of Biopsy Breast Percutaneous Needle Core   2  History of Dental Surgery   3  History of Dilation And Curettage   4  History of Hysteroscopy   5  History of Neuroplasty Median Nerve At Carpal Tunnel   6  History of Surgical Treatment For    7  History of Tonsillectomy    The surgical history was reviewed and updated today  Family History  Mother    1  Family history of Coronary Artery Disease (V17 49)   2  Family history of skin cancer (V16 8) (Z80 8)   3  Family history of varicose veins (V17 49) (Z82 49)   4  Family history of Hypertension (V17 49)   5  Family history of Uterine Cancer (V16 49)  Father    6  Family history of Coronary Artery Disease (V17 49)   7  Family history of    6  Family history of Hypertension (V17 49)   9  Family history of Stent Indications  Sister    10  Family history of colitis (V18 59) (Z83 79)  Maternal Grandmother    11  Family history of rheumatoid arthritis (V17 7) (Z82 61)  Family History    12  Denied: Family history of Crohn's disease   15  Denied: Family history of psoriasis   14  Denied: Family history of systemic lupus erythematosus   15  Denied: Family history of ulcerative colitis    The family history was reviewed and updated today         Social History    · Alcohol use (V49 89) (Z78 9)   · Alcohol Use (History)   · Always uses seat belt   · Caffeine use (V49 89) (F15 90)   · Denied: History of Drug Use   · Denied: History of    ·    · Never a smoker   · No drug use   · [facility]  The social history was reviewed and updated today  The social history was reviewed and is unchanged  Current Meds   1  Aciphex 20 MG Oral Tablet Delayed Release; TAKE 1 TABLET TWICE DAILY  Requested   for: 26XLY6164; Last Rx:47Ffi1873 Ordered   2  Advil 200 MG Oral Tablet; TAKE 3 TABLET 4 times daily PRN pain; Therapy: (Recorded:25Fbo5662) to Recorded   3  Cephalexin 500 MG Oral Capsule; TAKE 2 CAPSULES TWICE DAILY UNTIL GONE;   Therapy: 00FRA5112 to (Evaluate:68Uxi1938)  Requested for: 91Noj9934; Last   Rx:07Qxi1400 Ordered   4  Elidel 1 % External Cream; APPLY AS DIRECTED bid; Therapy: 12NCW6850 to Recorded   5  Fluticasone Propionate 50 MCG/ACT Nasal Suspension; instill 2 sprays into each nostril   once daily; Therapy: 06NFU4366 to (Evaluate:2017)  Requested for: 50GNN1596; Last   Rx:76Ksb4585 Ordered   6  Hydrochlorothiazide 25 MG Oral Tablet; TAKE 1 TABLET DAILY  Requested for:   35QLL4959; Last Rx:2015 Ordered   7  Hydroxychloroquine Sulfate 200 MG Oral Tablet; TAKE 1 TABLET DAILY WITH FOOD; Therapy: (Recorded:21Uzp1876) to Recorded   8  Multi-Vitamin Gummies CHEW; take 2 tablet daily; Therapy: (Recorded:36Obb9710) to Recorded   9  Xopenex HFA 45 MCG/ACT Inhalation Aerosol; INHALE 2 PUFFS EVERY 4-6 HOURS AS   NEEDED  Requested for: 91DDK7013; Last Rx:73Yjf4956 Ordered    The medication list was reviewed and updated today  Immunizations  Influenza --- Teresa Mcfarlane: Temporarily Deferred: Pt requests deferral   Tdap --- Teresa Mcfarlane: 2007     Allergies    1  No Known Drug Allergies    2  No Known Environmental Allergies   3   No Known Food Allergies    Vitals  Signs   Recorded: 73UBV7927 19:17QN   Systolic: 522  Diastolic: 70  Heart Rate: 76  Weight: 145 lb 8 oz  BMI Calculated: 25 37  BSA Calculated: 1 7    Physical Exam    Constitutional   General appearance: No acute distress, well appearing and well nourished  Eyes   Conjunctiva and lids: No swelling, erythema or discharge  Pupils and irises: Equal, round and reactive to light  Ears, Nose, Mouth, and Throat   External inspection of ears and nose: Normal     Oropharynx: Abnormal   Oral mucosa was dry  Pulmonary   Respiratory effort: No increased work of breathing or signs of respiratory distress  Auscultation of lungs: Clear to auscultation  Cardiovascular   Auscultation of heart: Normal rate and rhythm, normal S1 and S2, without murmurs  Examination of extremities for edema and/or varicosities: Normal     Lymphatic   Palpation of lymph nodes in neck: No lymphadenopathy  Psychiatric   Orientation to person, place, and time: Normal     Mood and affect: Normal         Right wrist tenderness  Right glenohumeral joint tenderness  Left wrist tenderness  Left glenohumeral joint tenderness  Left ankle tenderness and swelling  Musculoskeletal - Joints, bones, and muscles: Abnormal  Palpation - left ankle increased warmth and bilateral knee crepitus  The patient has tenderness in all MCP and PIP joints of the right hand  The patient has tenderness in all MCP and PIP joints of the left hand  The patient has swelling of all PIP joints of the right hand  The patient has swelling of all PIP joints of the left hand  Health Management  Benign essential hypertension   EKG/ECG- POC; every 1 year; Last 25CIL9811; Next Due: 22KDT9912; Active  Encounter for screening mammogram for malignant neoplasm of breast   DIGTL SCRN MAMMO W/CAD; every 1 year; Last 51MZS3047; Next Due: 82HFM9794; Active  History of Colonoscopy (Fiberoptic) Screening   COLONOSCOPY; every 10 years; Last 11Lkp6558; Next Due: 72LXF5290;  Active    Attending Note  Collaborating Physician: I did not interview and examine the patient, I discussed the case with the Advanced Practitioner and reviewed the note and I agree with the Advanced Practitioner note  Future Appointments    Date/Time Provider Specialty Site   08/26/2016 12:15 PM DIANNA Llanos   Obstetrics/Gynecology [facility]   10/06/2016 03:40 PM SHELLI Garza Rheumatology [facility]     Signatures   Electronically signed by : SHELLI Cyr; Aug  4 2016  4:19PM EST                       (Author)    Electronically signed by : Julisa Aleixs DO; Aug  4 2016  4:24PM EST                       (Author)

## 2018-01-15 NOTE — PROGRESS NOTES
Assessment    1  Rheumatoid arthritis of multiple sites with negative rheumatoid factor (714 0) (M06 09)   2  Primary generalized (osteo)arthritis (715 09) (M15 0)   3  Vitamin D deficiency (268 9) (E55 9)   4  NSAID long-term use (V58 64) (Z79 1)   5  Benign essential hypertension (401 1) (I10)   6  GERD without esophagitis (530 81) (K21 9)   7  Long-term use of hydroxychloroquine (V58 69) (Z79 899)    Plan    1  Hydroxychloroquine Sulfate 200 MG Oral Tablet; TAKE 2 TABLETS DAILY WITH   FOOD   2  (1) C-REACTIVE PROTEIN; Status:Active; Requested for:21Dme9223;    3  (1) SED RATE; Status:Active; Requested for:17Ssu3832;    4  Follow-up visit in 3 months Evaluation and Treatment  Follow-up  Status: Hold For -   Scheduling  Requested for: 53Ass0673   5  Call [phone] if: The pain seems worse ; Status:Complete;   Done: 90CKD9087   6  Call [phone] if: The symptoms seem worse ; Status:Complete;   Done:   32UIP4597   7  Call [phone] if: You have questions or concerns about your problem ;   Status:Complete;   Done: 66BAP8344    Discussion/Summary    This is a 61-year-old female presenting today for follow-up for rheumatoid arthritis  Patient states that she's been feeling about the same since last appointment  She did have an episode of severe pain and swelling in her hands a few weeks ago  She states that this did resolve however  She relates this severe pain to weather changes  She also describes some knee pain as well  She denies any further obvious joint swelling however does have morning stiffness lasting a few minutes  She has difficulty with sleep and does describe nonrestorative sleep as well as fatigue  She states that she is utilizing hydroxychloroquine however states that she does have difficulty taking this every day as she does feel very nauseous while taking the medication  Her last eye exam was this past year  She is utilizing Advil only as needed or pain with some relief   On physical examination there is synovitis of the PIPs bilaterally  She does have osteoarthritic changes of both hands with ulnar deviation  She does have normal passive range of motion of both upper and lower extremities with crepitus of the knees  There is nonpitting edema of both lower extremities  Patient CBC and CMP are within normal range  At this time patient's history and physical examination is most consistent with rheumatoid arthritis which appears to be mildly active with the use of hydroxychloroquine  Patient will plan to obtain a CRP and ESR at this time  In addition it was recommended that patient consider further treatment such as methotrexate as her joints do appear to be active and she is complaining of joint pain primarily of the hands  The patient declines further treatment at this time  She would like to continue on hydroxychloroquine at its current dose  She will return to the office in 3 months time for further evaluation however will contact the office in the interim if she has any further questions or concerns  Counseling  Rheumatology Counseling Documentation: The patient was counseled regarding instructions for management, prognosis, patient and family education, risks and benefits of treatment options and importance of compliance with treatment  Chief Complaint  F/U RA   Patient is here today for follow up of chronic conditions described in HPI  History of Present Illness  Patient is in the office today for follow up for RA  Feeling about the same  Few weeks ago with severe pain and swelling in the hands due to weather changes  Some knee pain as well  No other obvious joint swelling  +Am stiffness x few minutes  +difficulty with sleep  +Non restorative sleep  +Fatigue  Still with HCQ still with nausea and taking it not everyday  Last eye exam: this year  taking Advil with relief         RAPID3: 5 3/30      Review of Systems    Constitutional: fatigue, but no fever, no recent weight gain, no chills, no recent weight loss and no anorexia  HEENT: eye pain and erythema eye(s), but no blurred vision, no double vision, no amaurosis fugax, no dryness mouth, no mouth sores, not feeling congested and no sore throat    The patient presents with complaints of dryness of the eyes  Symptoms are improved by artificial tears  Cardiovascular: swelling in the arms or legs, but no chest pain or pressure and no dyspnea on exertion  Respiratory: no unusual or persistent cough, no shortness of breath and no pleurisy  Gastrointestinal: no abdominal pain, no nausea, no vomiting, no heartburn, no diarrhea, no constipation, no melena and no BRBPR  Genitourinary: no foamy urine, but no dysuria and no hematuria  Musculoskeletal: as noted in HPI  Integumentary no rash, no Raynaud's, no alopecia, no nail changes and no photosensitivity  Endocrine no polyuria and no polydipsia  Hematologic/Lymphatic: a tendency for easy bruising, but no unusual bleeding  Neurological: weakness, but no headache, no vertigo or dizziness and no tingling  Psychiatric: insomnia and non-restorative sleep  Active Problems    1  Allergic rhinitis (477 9) (J30 9)   2  Asthma, mild intermittent (493 90) (J45 20)   3  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)   4  Benign essential hypertension (401 1) (I10)   5  Eczema (692 9) (L30 9)   6  Encounter for routine gynecological examination (V72 31) (Z01 419)   7  Encounter for screening mammogram for malignant neoplasm of breast (V76 12)   (Z12 31)   8  GERD without esophagitis (530 81) (K21 9)   9  Grief reaction (309 0) (F43 20)   10  Hyperlipidemia (272 4) (E78 5)   11  Left ankle sprain (845 00) (S93 402A)   12  Leiomyoma of uterus (218 9) (D25 9)   13  Long-term use of hydroxychloroquine (V58 69) (Z79 899)   14  Nervousness (799 21) (R45 0)   15  NSAID long-term use (V58 64) (Z79 1)   16   Pain of thigh, unspecified laterality (729 5) (M79 659)   17  Pain, joint, ankle, left (719 47) (M25 572)   18  Primary generalized (osteo)arthritis (715 09) (M15 0)   19  Rheumatoid arthritis of multiple sites with negative rheumatoid factor (714 0) (M06 09)   20  Spider veins (448 1) (I78 1)   21  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 8) (I83 893)   22  Vitamin D deficiency (268 9) (E55 9)    Past Medical History    1  History of Abnormal findings on diagnostic imaging of breast (793 89) (R92 8)   2  Acute sinusitis (461 9) (J01 90)   3  History of Adjustment disorder with depressed mood (309 0) (F43 21)   4  History of Anxiety (300 00) (F41 9)   5  History of Blood tests for routine general physical examination (V72 62) (Z00 00)   6  History of Breast pain (611 71) (N64 4)   7  History of Colonoscopy (Fiberoptic) Screening   8  History of Cough (786 2) (R05)   9  History of Encounter for cervical Pap smear with pelvic exam (V76 2,V72 31) (Z01 419)   10  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)   11  History of Female pelvic pain (625 9) (R10 2)   12  History of  1 (V22 0) (Z34 00)   13  History of polyarthritis (V13 4) (Z87 39)   14  History of self breast exam   15  History of upper respiratory infection (V12 09) (Z87 09)   16  History of uterine leiomyoma (V13 29) (Z86 018)   17  History of Multiple joint pain (719 49) (M25 50)   18  History of Need for prophylactic vaccination and inoculation against influenza (V04 81)    (Z23)   19  History of Pain in joint of left shoulder (719 41) (M25 512)   20  History of Pain in wrist, unspecified laterality (719 43) (M25 539)   21  History of Sprained Ribs (848 3)   22  Tick bite of neck (910 4,E906 4) (L81 13AY,E89  XXXA)   23  Upper respiratory infection (465 9) (J06 9)    The active problems and past medical history were reviewed and updated today  Surgical History    1  History of Biopsy Breast Percutaneous Needle Core   2  History of Dental Surgery   3  History of Dilation And Curettage   4  History of Hysteroscopy   5  History of Neuroplasty Median Nerve At Carpal Tunnel   6  History of Surgical Treatment For    7  History of Tonsillectomy    The surgical history was reviewed and updated today  Family History  Mother    1  Family history of Coronary Artery Disease (V17 49)   2  Family history of skin cancer (V16 8) (Z80 8)   3  Family history of varicose veins (V17 49) (Z82 49)   4  Family history of Hypertension (V17 49)   5  Family history of Uterine Cancer (V16 49)  Father    6  Family history of Coronary Artery Disease (V17 49)   7  Family history of    6  Family history of Hypertension (V17 49)   9  Family history of Stent Indications  Sister    10  Family history of colitis (V18 59) (Z83 79)  Maternal Grandmother    11  Family history of rheumatoid arthritis (V17 7) (Z82 61)  Family History    12  Denied: Family history of Crohn's disease   15  Denied: Family history of psoriasis   14  Denied: Family history of systemic lupus erythematosus   15  Denied: Family history of ulcerative colitis    The family history was reviewed and updated today  Social History    · Alcohol Use (History)   · Always uses seat belt   · Caffeine use (V49 89) (F15 90)   · Denied: History of Drug Use   · Denied: History of    ·    · Never a smoker   · No drug use   · [employer]  The social history was reviewed and updated today  The social history was reviewed and is unchanged  Current Meds   1  Elidel 1 % External Cream; APPLY AS DIRECTED bid; Therapy: 51ZVE1216 to (Last Rx:67Jxi4908)  Requested for: 23Rvf4264 Ordered   2  Fluticasone Propionate 50 MCG/ACT Nasal Suspension; USE 2 SPRAYS IN EACH   NOSTRIL ONCE DAILY  Requested for: 99HOD4012; Last Rx:66Vyr7597 Ordered   3  HydroCHLOROthiazide 25 MG Oral Tablet; TAKE 1 TABLET DAILY AS DIRECTED    Requested for: 89JMZ6567; Last Rx:66Qoz2935 Ordered   4  Hydroxychloroquine Sulfate 200 MG Oral Tablet; TAKE 2 TABLETS DAILY WITH FOOD;    Therapy: 31QKR3960 to (Evaluate:58Obs6785)  Requested for: 31DZL8290; Last   THOMAS:82MWZ5086 Ordered   5  RABEprazole Sodium 20 MG Oral Tablet Delayed Release; TAKE 1 TABLET DAILY 30 min   before dinner  Requested for: 88FMY1970; Last Rx:58Dff5610 Ordered   6  Xopenex HFA 45 MCG/ACT Inhalation Aerosol; INHALE 2 PUFFS EVERY 4-6 HOURS AS   NEEDED  Requested for: 84MFY5444; Last Rx:90Zew3419 Ordered    The medication list was reviewed and updated today  Immunizations  Influenza --- Maral Toshia: Temporarily Deferred: Pt requests deferral   Tdap --- Maral Toshia: Apr 2007     Allergies    1  No Known Drug Allergies    2  No Known Environmental Allergies   3  No Known Food Allergies    Vitals  Signs   Recorded: 20Jul2017 03:36PM   Heart Rate: 84  Systolic: 053  Diastolic: 82  Height: 5 ft 5 in  Weight: 142 lb 6 oz  BMI Calculated: 23 69  BSA Calculated: 1 71    Physical Exam    Constitutional   General appearance: No acute distress, well appearing and well nourished  Eyes   Conjunctiva and lids: No swelling, erythema or discharge  Pupils and irises: Equal, round and reactive to light  Ears, Nose, Mouth, and Throat   External inspection of ears and nose: Normal     Oropharynx: Normal with no erythema, edema, exudate lesions, or ulcers  Pulmonary   Respiratory effort: No increased work of breathing or signs of respiratory distress  Auscultation of lungs: Clear to auscultation  Cardiovascular   Auscultation of heart: Normal rate and rhythm, normal S1 and S2, without murmurs  Examination of extremities for edema and/or varicosities: Normal     Psychiatric   Orientation to person, place, and time: Normal     Mood and affect: Normal       Right 2nd Finger DIP tenderness  Right ankle swelling  Left ankle swelling  Right Upper Extremity: Right Hand: Right Hand Appearance: Heberden's nodule at the all DIPs digit and ulnar deviation   Right Wrist: Right Elbow: Right Shoulder:   Left Upper Extremity: Left Hand: Appearance: all DIPs digit(s) Heberden's Node(s) and ulnar deviation  Left Wrist: Left Elbow: Left Shoulder:   Musculoskeletal - Joints, bones, and muscles: Abnormal  Palpation - bilateral knee crepitus  The patient has tenderness in all PIP joints of the right hand  The patient has tenderness in all PIP joints of the left hand  The patient has swelling of all PIP joints of the right hand  The patient has swelling of all PIP joints of the left hand  Right foot: All MTP, PIP and DIP joints are normal  Left foot: All MTP, PIP and DIP joints are normal       Results/Data  (1) COMPREHENSIVE METABOLIC PANEL [id number] 29:42HN [facility]     Test Name Result Flag Reference   GLUCOSE 74 mg/dL  65-99   Fasting reference interval   UREA NITROGEN (BUN) 12 mg/dL  7-25   CREATININE 0 87 mg/dL  0 50-1 05   For patients >52years of age, the reference limit  for Creatinine is approximately 13% higher for people  identified as -American  eGFR NON-AFR   AMERICAN 73 mL/min/1 73m2  > OR = 60   eGFR AFRICAN AMERICAN 85 mL/min/1 73m2  > OR = 60   BUN/CREATININE RATIO   3-36   NOT APPLICABLE (calc)   SODIUM 137 mmol/L  135-146   POTASSIUM 3 7 mmol/L  3 5-5 3   CHLORIDE 100 mmol/L     CARBON DIOXIDE 26 mmol/L  20-31   CALCIUM 9 3 mg/dL  8 6-10 4   PROTEIN, TOTAL 6 6 g/dL  6 1-8 1   ALBUMIN 4 4 g/dL  3 6-5 1   GLOBULIN 2 2 g/dL (calc)  1 9-3 7   ALBUMIN/GLOBULIN RATIO 2 0 (calc)  1 0-2 5   BILIRUBIN, TOTAL 0 6 mg/dL  0 2-1 2   ALKALINE PHOSPHATASE 79 U/L     AST 26 U/L  10-35   ALT 18 U/L  6-29     (1) CBC/PLT/DIFF 49VOR0843 08:01AM [facility]     Test Name Result Flag Reference   WHITE BLOOD CELL COUNT 5 6 Thousand/uL  3 8-10 8   RED BLOOD CELL COUNT 3 96 Million/uL  3 80-5 10   HEMOGLOBIN 13 4 g/dL  11 7-15 5   HEMATOCRIT 39 4 %  35 0-45 0   MCV 99 5 fL  80 0-100 0   MCH 33 9 pg H 27 0-33 0   MCHC 34 1 g/dL  32 0-36 0   RDW 12 5 %  11 0-15 0   PLATELET COUNT 659 Thousand/uL  140-400   ABSOLUTE NEUTROPHILS 3993 cells/uL  0802-5202   ABSOLUTE LYMPHOCYTES 1114 cells/uL  850-3900   ABSOLUTE MONOCYTES 386 cells/uL  200-950   ABSOLUTE EOSINOPHILS 67 cells/uL     ABSOLUTE BASOPHILS 39 cells/uL  0-200   NEUTROPHILS 71 3 %     LYMPHOCYTES 19 9 %     MONOCYTES 6 9 %     EOSINOPHILS 1 2 %     BASOPHILS 0 7 %     MPV 8 0 fL  7 5-12 5       Health Management  Benign essential hypertension   EKG/ECG- POC; every 1 year; Last 16HVL0203; Next Due: 46PMD8276; Overdue  Encounter for screening mammogram for malignant neoplasm of breast   DIGTL SCRN MAMMO W/CAD; every 1 year; Last 62ZJS3669; Next Due: 36KCZ9091; Overdue  History of Colonoscopy (Fiberoptic) Screening   COLONOSCOPY; every 10 years; Last 43Acf4894; Next Due: 64RCX9622; Active    Attending Note  Collaborating Physician: I did not interview and examine the patient, I discussed the case with the Advanced Practitioner and reviewed the note and I agree with the Advanced Practitioner note  Future Appointments    Date/Time Provider Specialty Site   08/01/2017 05:00 PM DIANNA Horner 5   10/19/2017 03:40 PM Carol Devine, [facility]umat[facility] [address] [facility]     Signatures   Electronically signed by : Chio Justin, [facility]; Jul 20 2017  3:57PM EST                       (Author)    Electronically signed by : Carlos Pritchard DO; Jul 20 2017  5:17PM EST                       (Author)

## 2018-01-15 NOTE — PROGRESS NOTES
Assessment    1  Rheumatoid arthritis of multiple sites with negative rheumatoid factor (714 0) (M06 09)   2  Primary generalized (osteo)arthritis (715 09) (M15 0)   3  Vitamin D deficiency (268 9) (E55 9)   4  NSAID long-term use (V58 64) (Z79 1)   5  Benign essential hypertension (401 1) (I10)   6  GERD without esophagitis (530 81) (K21 9)   7  Long-term use of hydroxychloroquine (V58 69) (Z79 899)    Plan    1  (1) CBC/PLT/DIFF; Status:Active; Requested for:06Oct2016;    2  (1) COMPREHENSIVE METABOLIC PANEL; Status:Active; Requested for:06Oct2016;    3  (1) C-REACTIVE PROTEIN; Status:Active; Requested for:06Oct2016;    4  (1) SED RATE; Status:Active; Requested for:06Oct2016;    5  Follow-up visit in 3 months Evaluation and Treatment  Follow-up  Status: Complete    Done: 66JDJ2563    1  Call [phone] if: The pain seems worse ; Status:Complete;   Done: 33RDA4170   7  Call [phone] if: The symptoms seem worse ; Status:Complete;   Done:   19NWO8634   4  Call [phone] if: You have questions or concerns about your problem ;   Status:Complete;   Done: 62EVD1543    5  Hydroxychloroquine Sulfate 200 MG Oral Tablet; Take 1 tablet daily    Discussion/Summary    This is a 58-year-old female presenting today for follow-up for rheumatoid arthritis  Patient states that since last being seen she did follow-up with her orthopedic for evaluation of her left ankle pain and swelling  She was told that she had torn tendons in her left ankle and foot and was given an Aircast  She also completed physical therapy with limited relief  She also experiences pain in both hands as well as the right knee  She notices swelling in the left ankle and right knee  She denies any further obvious joint swelling  She does have morning stiffness lasting a few minutes  She also describes difficulty with sleep as well as nonrestorative sleep  She has occasional fatigue   In addition to hydroxychloroquine she does utilize Advil as needed  The patient states that her last eye exam was a year ago and does have a scheduled appointment in November 2016  On physical examination, patient does have mild synovitis of the PIPs bilaterally as well as of the left ankle  She also has tenderness of the right knee and left ankle and crepitus of bilateral knees  Patient's most recent labs including a CBC, CMP, CRP, and ESR were all within normal range  At this time patient's history and physical examination is most consistent with rheumatoid arthritis which appears to be stable at this time on hydroxychloroquine 200 mg daily  Patient would like to continue with her current medication regimen  We will plan to see patient back in 3 months time for further evaluation and she will repeat a CBC, CMP, CRP, and ESR before that appointment  She will call in the interim if she has any further questions or concerns  The patient was counseled regarding diagnostic results, instructions for management, prognosis, patient and family education, risks and benefits of treatment options, importance of compliance with treatment  Chief Complaint  F/U RA   Patient is here today for follow up of chronic conditions described in HPI  History of Present Illness  Patient is in the office today for follow up for RA  Was seen for ankle pain and was given a air cast which she could not wear  Completed PT for the ankle with limited relief  Torn tendons still healing  Pain in the hands and right knee  Some right knee swelling  No other obvious joint swelling  +Am stiffness x few minutes  +Difficulty with sleep  +non restorative sleep  +Occasional fatigue  Taking Advil  Eye exam: November, 2016 coming up  RAPID3: 3 0 /30      Review of Systems    Constitutional: fatigue, but no fever, no recent weight gain, no chills, no recent weight loss and no anorexia     HEENT: blurred vision, dryness of the eyes, eye pain and erythema eye(s), but no double vision, no amaurosis fugax, no dryness mouth, no mouth sores, not feeling congested and no sore throat  Cardiovascular: swelling in the arms or legs, but no chest pain or pressure and no dyspnea on exertion  Respiratory: no unusual or persistent cough, no shortness of breath and no pleurisy  Gastrointestinal: heartburn, but no abdominal pain, no nausea, no vomiting, no diarrhea, no constipation, no melena and no BRBPR  Genitourinary: No foamy urine, but no dysuria and no hematuria  Musculoskeletal: as noted in HPI  Integumentary no rash, no Raynaud's, no alopecia, no nail changes and no photosensitivity  Endocrine no polyuria and no polydipsia  Hematologic/Lymphatic: a tendency for easy bruising, but no unusual bleeding  Neurological: tingling, but no headache, no vertigo or dizziness and no weakness  Psychiatric: insomnia and non-restorative sleep  Active Problems    1  Alcohol use (V49 89) (Z78 9)   2  Allergic rhinitis (477 9) (J30 9)   3  Arthralgia of hand (719 44) (M79 643)   4  Asthma (493 90) (J45 909)   5  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)   6  Benign essential hypertension (401 1) (I10)   7  Cellulitis of leg, left (682 6) (L03 116)   8  Encounter for routine gynecological examination (V72 31) (Z01 419)   9  Encounter for screening mammogram for malignant neoplasm of breast (V76 12)   (Z12 31)   10  GERD without esophagitis (530 81) (K21 9)   11  Hyperlipidemia (272 4) (E78 5)   12  Left ankle sprain (845 00) (S93 402A)   13  Leiomyoma of uterus (218 9) (D25 9)   14  Long-term use of hydroxychloroquine (V58 69) (Z79 899)   15  Nervousness (799 21) (R45 0)   16  NSAID long-term use (V58 64) (Z79 1)   17  Pain of thigh, unspecified laterality (729 5) (M79 659)   18  Pain, joint, ankle, left (719 47) (M25 572)   19  Primary generalized (osteo)arthritis (715 09) (M15 0)   20  Rheumatoid arthritis of multiple sites with negative rheumatoid factor (714 0) (M06 09)   21  Right knee pain (719 46) (I51 343)   22  Soft Tissue Pain In Lower Extremities (729 5)   23  Spider veins (448 1) (I78 1)   24  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 8) (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)   25  Vitamin D deficiency (268 9) (E55 9)    Past Medical History    1  History of Abnormal findings on diagnostic imaging of breast (793 89) (R92 8)   2  Acute sinusitis (461 9) (J01 90)   3  History of Adjustment disorder with depressed mood (309 0) (F43 21)   4  History of Anxiety (300 00) (F41 9)   5  History of Blood tests for routine general physical examination (V72 62) (Z00 00)   6  History of Breast pain (611 71) (N64 4)   7  History of Colonoscopy (Fiberoptic) Screening   8  History of Cough (786 2) (R05)   9  History of Encounter for cervical Pap smear with pelvic exam (V76 2,V72 31) (Z01 419)   10  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)   11  History of Female pelvic pain (625 9) (R10 2)   12  History of  1 (V22 0) (Z34 00)   13  History of polyarthritis (V13 4) (Z87 39)   14  History of self breast exam   15  History of upper respiratory infection (V12 09) (Z87 09)   16  History of uterine leiomyoma (V13 29) (Z86 018)   17  History of Multiple joint pain (719 49) (M25 50)   18  History of Need for prophylactic vaccination and inoculation against influenza (V04 81)    (Z23)   19  History of Pain in joint of left shoulder (719 41) (M25 512)   20  History of Pain in wrist, unspecified laterality (719 43) (M25 539)   21  History of Sprained Ribs (848 3)   22  Tick bite of neck (910 4,E906 4) (D06 83PT,E76  XXXA)   23  Upper respiratory infection (465 9) (J06 9)    The active problems and past medical history were reviewed and updated today  Surgical History    1  History of Biopsy Breast Percutaneous Needle Core   2  History of Dental Surgery   3  History of Dilation And Curettage   4  History of Hysteroscopy   5  History of Neuroplasty Median Nerve At Carpal Tunnel   6  History of Surgical Treatment For    7   History of Tonsillectomy    The surgical history was reviewed and updated today  Family History  Mother    1  Family history of Coronary Artery Disease (V17 49)   2  Family history of skin cancer (V16 8) (Z80 8)   3  Family history of varicose veins (V17 49) (Z82 49)   4  Family history of Hypertension (V17 49)   5  Family history of Uterine Cancer (V16 49)  Father    6  Family history of Coronary Artery Disease (V17 49)   7  Family history of    6  Family history of Hypertension (V17 49)   9  Family history of Stent Indications  Sister    10  Family history of colitis (V18 59) (Z83 79)  Maternal Grandmother    11  Family history of rheumatoid arthritis (V17 7) (Z82 61)  Family History    12  Denied: Family history of Crohn's disease   15  Denied: Family history of psoriasis   14  Denied: Family history of systemic lupus erythematosus   15  Denied: Family history of ulcerative colitis    The family history was reviewed and updated today  Social History    · Alcohol use (V49 89) (Z78 9)   · Alcohol Use (History)   · Always uses seat belt   · Caffeine use (V49 89) (F15 90)   · Denied: History of Drug Use   · Denied: History of    ·    · Never a smoker   · No drug use   · [employer]  The social history was reviewed and updated today  The social history was reviewed and is unchanged  Current Meds   1  Aciphex 20 MG Oral Tablet Delayed Release; TAKE 1 TABLET DAILY; Therapy: (Recorded:2016) to Recorded   2  Elidel 1 % External Cream; APPLY AS DIRECTED bid; Therapy: 87VBI2331 to Recorded   3  Flonase 50 MCG/ACT SUSP; USE 1 SPRAY IN EACH NOSTRIL ONCE DAILY; Therapy: (Recorded:2016) to Recorded   4  Hydrochlorothiazide 25 MG Oral Tablet; TAKE 1 TABLET DAILY AS DIRECTED; Therapy: (Recorded:2016) to Recorded   5  Hydroxychloroquine Sulfate 200 MG Oral Tablet; Take 1 tablet daily;    Therapy: (Recorded:2016) to Recorded    The medication list was reviewed and updated today       Immunizations  Influenza --- Ryan Lawrence: Temporarily Deferred: Pt requests deferral   Tdap --- Ryan Lawrence: Apr 2007     Allergies    1  No Known Drug Allergies    2  No Known Environmental Allergies   3  No Known Food Allergies    Vitals  Signs   Recorded: 71AYE7705 59:50MG   Systolic: 066  Diastolic: 66  Heart Rate: 66  Weight: 145 lb   BMI Calculated: 25 69  BSA Calculated: 1 69    Physical Exam    Constitutional   General appearance: No acute distress, well appearing and well nourished  Eyes   Conjunctiva and lids: No swelling, erythema or discharge  Pupils and irises: Equal, round and reactive to light  Ears, Nose, Mouth, and Throat   External inspection of ears and nose: Normal     Oropharynx: Normal with no erythema, edema, exudate lesions, or ulcers  Pulmonary   Respiratory effort: No increased work of breathing or signs of respiratory distress  Auscultation of lungs: Clear to auscultation  Cardiovascular   Auscultation of heart: Normal rate and rhythm, normal S1 and S2, without murmurs  Examination of extremities for edema and/or varicosities: Normal     Lymphatic   Palpation of lymph nodes in neck: No lymphadenopathy  Psychiatric   Orientation to person, place, and time: Normal     Mood and affect: Normal         Right knee tenderness  Left ankle tenderness and swelling  Musculoskeletal - Joints, bones, and muscles: Abnormal  Palpation - bilateral knee crepitus  The patient has swelling of all PIP joints of the right hand  The patient has swelling of all PIP joints of the left hand  Health Management  Benign essential hypertension   EKG/ECG- POC; every 1 year; Last 68HAY7880; Next Due: 98OCD2532; Near Due  Encounter for screening mammogram for malignant neoplasm of breast   DIGTL SCRN MAMMO W/CAD; every 1 year; Last 01IAK5843; Next Due: 00JGV3616; Near Due  History of Colonoscopy (Fiberoptic) Screening   COLONOSCOPY; every 10 years; Last 35Kkf4892;  Next Due: 49OSY6074; Active    Attending Note  Collaborating Physician: I did not interview and examine the patient, I discussed the case with the Advanced Practitioner and reviewed the note and I agree with the Advanced Practitioner note  Future Appointments    Date/Time Provider Specialty Site   01/17/2017 05:15 PM Saralyn Schwab, [facility]   12/01/2016 03:40 PM DIANNA Rojas   Orthopedic Surgery [address]   01/06/2017 03:40 PM SHELLI Booth [facility]umat[facility]     Signatures   Electronically signed by : SHELLI Wilcox; Oct  6 2016  4:02PM EST                       (Author)    Electronically signed by : Monique Seo DO; Oct 11 2016  9:23AM EST                       (Author)

## 2018-01-15 NOTE — MISCELLANEOUS
Assessment    1  Never a smoker   2  Vertigo (780 4) (R42)   3  Tinnitus (388 30) (H93 19)   4  Sinus congestion (478 19) (R09 81)   5  GERD without esophagitis (530 81) (K21 9)    Plan  Sinus congestion    · PredniSONE 10 MG Oral Tablet; 4 pills at once daily with food for 2 days, 3 pills at  once daily for 2 days, 2 pills daily at once  for 2 days, one pill daily for 2 days   Rx By: Raji Lai; Dispense: 0 Days ; #:20 Tablet; Refill: 0; For: Sinus congestion; GRISELDA = N; Verified Transmission to [address]; Last Updated By: SystemDatezr; 11/21/2017 5:06:30 PM  Tinnitus    · DiazePAM 2 MG Oral Tablet (Valium); TAKE 1 TABLET  at bedtime as needed for  ringing in ears   Rx By: Raji Lai; Dispense: 0 Days ; #:10 Tablet; Refill: 0; For: Tinnitus; GRISELDA = N; Print Rx  Tinnitus, Vertigo    · 1 Gregory Dillard MD, [facility]  *  Status: Hold For - Scheduling   Requested for: 20YBP9296   Ordered; For: Tinnitus, Vertigo; Ordered By: Raji Lai Performed:  Due: 11YOF6717  Care Summary provided  : Yes   · Avoid getting up or changing positions quickly ; Status:Complete;   Done: 11VIH5256   Ordered; For:Tinnitus, Vertigo; Ordered By:Marleen Mcleod;  Vertigo    · Meclizine HCl - 25 MG Oral Tablet; TAKE 1 TABLET BY MOUTH EVERY 6-8 HOURS  AS NEEDED FOR DIZZINESS   Rx By: Raji Lai; Dispense: 5 Days ; #:15 Tablet; Refill: 0; For: Vertigo; GRISELDA = N; Record    Discussion/Summary  Discussion Summary:   1  Vertigo - rest, increase fluids, take Meclizine 25 mg every 6-8 hours as needed, discussed Physical/ vestibular therapy if symptoms persist  2  Tinnitus - Valium 2 mg at bedtime as needed, ENT referral   3  Sinus congestion - continue Flonase and saline nasal spray, start Prednisone taper  4  GERD - continue Aciphex 20 mg daily  RTO as scheduled in March or sooner if needed     Medication SE Review and Pt Understands Tx: Possible side effects of new medications were reviewed with the patient/guardian today  The treatment plan was reviewed with the patient/guardian  The patient/guardian understands and agrees with the treatment plan      Chief Complaint  Chief Complaint Free Text Note Form: Pt presents in the office today for a AMAIRANI from [facility]; Pt c/o ringing in R ear and pain in neck; Colon due 07/23/2017  Mammo due 11/07/2017  Pap due 08/26/2018      History of Present Illness  TCM Communication [place]: The patient is being contacted for follow-up after hospitalization  She was hospitalized at [facility]  The date of admission: 11/14/2017, date of discharge: 11/15/2017  Diagnosis: Dizziness  She was discharged to home  Medications reviewed and updated today  She scheduled a follow up appointment  Symptoms: dizziness and headache  Counseling was provided to the patient  Communication performed and completed by Emmy Lea 11/16/2017   HPI: Pt presents today for f/u hospitalization at [address] on 11/14/17 - 11/15/17 for evaluation and treatment of vertigo associated with nausea/ vomiting and ringing in her right ear  Her CT scan brain showed mild left sphenoid sinus mucosal disease, no air-fluid level, her lab work was all normal and her MRI brain showed no acute abnormality  Her condition gradually improved and she was d/c'd home on 11/15/17 on po Meclizine to take as needed  Pt states she is doing better, still having mild dizziness when changing position too fast or bending over, she is still having sinus pressure and ringing in her right ear which gets worse at night  Pt denies fever or chills, nausea/vomiting, blurry vision, headache, purulent mucus production, ear pain or decreased hearing  Review of Systems  Complete-Female:   Constitutional: no fever, not feeling poorly, no chills and not feeling tired  Eyes: no eye pain, no dryness of the eyes, no purulent discharge from the eyes and no itching of the eyes     ENT: as noted in HPI, no earache, no nosebleeds, no sore throat, no nasal discharge and no hoarseness  Cardiovascular: No complaints of slow heart rate, no fast heart rate, no chest pain, no palpitations, no leg claudication, no lower extremity edema  Respiratory: No complaints of shortness of breath, no wheezing, no cough, no SOB on exertion, no orthopnea, no PND  Gastrointestinal: No complaints of abdominal pain, no constipation, no nausea or vomiting, no diarrhea, no bloody stools  Genitourinary: no dysuria and no pelvic pain  Integumentary: no rashes  Neurological: no headache, no numbness, no tingling, no dizziness, no limb weakness, no fainting and no difficulty walking  Active Problems    1  Allergic rhinitis (477 9) (J30 9)   2  Asthma, mild intermittent (493 90) (J45 20)   3  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)   4  Benign essential hypertension (401 1) (I10)   5  Contact dermatitis due to plant (692 6) (L25 5)   6  Eczema (692 9) (L30 9)   7  Encounter for routine gynecological examination (V72 31) (Z01 419)   8  Encounter for screening mammogram for malignant neoplasm of breast (V76 12)   (Z12 31)   9  GERD without esophagitis (530 81) (K21 9)   10  Hyperlipidemia (272 4) (E78 5)   11  Left ankle sprain (845 00) (S93 402A)   12  Leiomyoma of uterus (218 9) (D25 9)   13  Long-term use of hydroxychloroquine (V58 69) (Z79 899)   14  Nervousness (799 21) (R45 0)   15  NSAID long-term use (V58 64) (Z79 1)   16  Pain of thigh, unspecified laterality (729 5) (M79 659)   17  Pain, joint, ankle, left (719 47) (M25 572)   18  Primary generalized (osteo)arthritis (715 09) (M15 0)   19  Rheumatoid arthritis of multiple sites with negative rheumatoid factor (714 0) (M06 09)   20  Sinus congestion (478 19) (R09 81)   21  Spider veins (448 1) (I78 1)   22  Tinnitus (388 30) (H93 19)   23  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 8) (I83 893)   24  Vertigo (780 4) (R42)   25   Vitamin D deficiency (268 9) (E56 9)    Past Medical History    1  History of Abnormal findings on diagnostic imaging of breast (793 89) (R92 8)   2  Acute sinusitis (461 9) (J01 90)   3  History of Adjustment disorder with depressed mood (309 0) (F43 21)   4  History of Anxiety (300 00) (F41 9)   5  History of Blood tests for routine general physical examination (V72 62) (Z00 00)   6  History of Breast pain (611 71) (N64 4)   7  History of Colonoscopy (Fiberoptic) Screening   8  History of Cough (786 2) (R05)   9  History of Encounter for cervical Pap smear with pelvic exam (V76 2,V72 31) (Z01 419)   10  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)   11  History of Female pelvic pain (625 9) (R10 2)   12  History of  1 (V22 0) (Z34 00)   13  History of polyarthritis (V13 4) (Z87 39)   14  History of self breast exam   15  History of upper respiratory infection (V12 09) (Z87 09)   16  History of uterine leiomyoma (V13 29) (Z86 018)   17  History of Multiple joint pain (719 49) (M25 50)   18  History of Need for prophylactic vaccination and inoculation against influenza (V04 81)    (Z23)   19  History of Pain in joint of left shoulder (719 41) (M25 512)   20  History of Pain in wrist, unspecified laterality (719 43) (M25 539)   21  History of Sprained Ribs (848 3)   22  Tick bite of neck (910 4,E906 4) (A44 24KD,G39  XXXA)   23  Upper respiratory infection (465 9) (J06 9)    Surgical History    1  History of Biopsy Breast Percutaneous Needle Core   2  History of Dental Surgery   3  History of Dilation And Curettage   4  History of Hysteroscopy   5  History of Neuroplasty Median Nerve At Carpal Tunnel   6  History of Surgical Treatment For    7  History of Tonsillectomy  Surgical History Reviewed: The surgical history was reviewed and updated today  Family History  Mother    1  Family history of Coronary Artery Disease (V17 49)   2  Family history of skin cancer (V16 8) (Z80 8)   3  Denied: Family history of substance abuse   4   Family history of varicose veins (V17 49) (Z82 49)   5  Family history of Hypertension (V17 49)   6  No family history of mental disorder   7  Family history of Uterine Cancer (V16 49)  Father    8  Family history of Coronary Artery Disease (V17 49)   9  Family history of    8  Denied: Family history of substance abuse   11  Family history of Hypertension (V17 49)   12  No family history of mental disorder   15  Family history of Stent Indications  Sister    15  Family history of colitis (V18 59) (Z83 79)  Maternal Grandmother    13  Family history of rheumatoid arthritis (V17 7) (Z82 61)  Family History    16  Denied: Family history of Crohn's disease   16  Denied: Family history of psoriasis   18  Denied: Family history of systemic lupus erythematosus   19  Denied: Family history of ulcerative colitis  Family History Reviewed: The family history was reviewed and updated today  Social History    · Alcohol Use (History)   · Always uses seat belt   · Caffeine use (V49 89) (F15 90)   · Denied: History of Drug Use   · Denied: History of    ·    · Never a smoker   · No drug use   · [facility] History Reviewed: The social history was reviewed and updated today  Current Meds   1  Aciphex 20 MG Oral Tablet Delayed Release; TAKE 1 TABLET DAILY  Requested for:   90Kvn3562; Last Rx:54Uhp6278 Ordered   2  Elidel 1 % External Cream; APPLY AS DIRECTED bid; Therapy: 87IHY4474 to (Last Rx:93Cti3260)  Requested for: 07Bmi7625 Ordered   3  Fluticasone Propionate 50 MCG/ACT Nasal Suspension; USE 2 SPRAYS IN EACH   NOSTRIL ONCE DAILY  Requested for: 23HLH5742; Last Rx:63Orc5906 Ordered   4  HydroCHLOROthiazide 25 MG Oral Tablet; TAKE 1 TABLET DAILY AS DIRECTED    Requested for: 31YQS4356; Last Rx:27Zwy2058 Ordered   5  Hydroxychloroquine Sulfate 200 MG Oral Tablet; TAKE 2 TABLETS DAILY WITH FOOD;    Therapy: 84VXA0387 to (Evaluate:2017)  Requested for: 60Nwq6350; Last   Rx:36Yjg2402 Ordered   6  Triamcinolone Acetonide 0 1 % External Ointment; APPLY SPARINGLY AND RUB IN   WELL TO AFFECTED AREA(S) 2-3 TIMES DAILYAS NEEDED; Therapy: 01Aug2017 to (Last Rx:01Aug2017)  Requested for: 01Aug2017 Ordered   7  Vitamin D3 CHEW;   Therapy: (Recorded:21Nov2017) to Recorded   8  Xopenex HFA 45 MCG/ACT Inhalation Aerosol; INHALE 2 PUFFS EVERY 4-6 HOURS AS   NEEDED  Requested for: 31IUN9806; Last Rx:31Jan2017 Ordered  Medication List Reviewed: The medication list was reviewed and updated today  Allergies    1  No Known Drug Allergies    Vitals  Signs   Recorded: 21Nov2017 04:40PM   Heart Rate: 70  Respiration: 16  Systolic: 301  Diastolic: 78  Height: 5 ft 4 in  Weight: 142 lb   BMI Calculated: 24 37  BSA Calculated: 1 69    Physical Exam    Constitutional   General appearance: No acute distress, well appearing and well nourished  Eyes   Conjunctiva and lids: No swelling, erythema or discharge  PERRL, EOMI  Ears, Nose, Mouth, and Throat   Otoscopic examination: Tympanic membranes translucent with normal light reflex  Canals patent without erythema  Nasal mucosa, septum, and turbinates: Abnormal   no nasal discharge  The bilateral nasal mucosa was edematous and red  Oropharynx: Normal with no erythema, edema, exudate or lesions  Pulmonary   Respiratory effort: No increased work of breathing or signs of respiratory distress  Auscultation of lungs: Clear to auscultation  Cardiovascular   Auscultation of heart: Normal rate and rhythm, normal S1 and S2, without murmurs  Examination of extremities for edema and/or varicosities: Normal     Abdomen   Abdomen: Non-tender, no masses  Liver and spleen: No hepatomegaly or splenomegaly  Lymphatic   Palpation of lymph nodes in neck: No lymphadenopathy  Neurologic   Cranial nerves: Cranial nerves 2-12 intact  Reflexes: 2+ and symmetric      Psychiatric   Orientation to person, place, and time: Normal     Mood and affect: Normal  Results/Data  Hospital records 11/2017 reviewed and discussed with Pt        Future Appointments    Date/Time Provider Specialty Site   03/20/2018 05:00 PM DIANNA Hui   [address] [place] [address]   12/11/2017 03:30 PM Trav Gómez, [facility] Rheumatology [facility] [address] [place] [address]     Signatures   Electronically signed by : DIANNA Castaneda ; Nov 21 2017 11:26PM EST                       (Author)

## 2018-01-18 NOTE — PROGRESS NOTES
Assessment    1  Rheumatoid arthritis of multiple sites with negative rheumatoid factor (714 0) (M06 09)   2  Primary generalized (osteo)arthritis (715 09) (M15 0)   3  Vitamin D deficiency (268 9) (E55 9)   4  NSAID long-term use (V58 64) (Z79 1)   5  Benign essential hypertension (401 1) (I10)   6  GERD without esophagitis (530 81) (K21 9)   7  Long-term use of hydroxychloroquine (V58 69) (Z79 899)    Plan    1  Hydroxychloroquine Sulfate 200 MG Oral Tablet; TAKE 2 TABLETS DAILY WITH   FOOD   2  (1) CBC/PLT/DIFF; Status:Active; Requested for:01May2017;    3  (1) COMPREHENSIVE METABOLIC PANEL; Status:Active; Requested for:01May2017;    4  (1) C-REACTIVE PROTEIN; Status:Active; Requested for:01May2017;    5  (1) SED RATE; Status:Active; Requested for:01May2017;    6  Call [phone] if: The pain seems worse ; Status:Complete;   Done: 24JSB6756   7  Call [phone] if: The symptoms seem worse ; Status:Complete;   Done:   80BEZ3778   8  Call [phone] if: You have questions or concerns about your problem ;   Status:Complete;   Done: 44ZAM9651   9  Follow-up visit in 2 months Evaluation and Treatment  Follow-up  Status: Complete    Done: 85HKY1508    Discussion/Summary    This is a 73-year-old female presenting today for follow-up for rheumatoid arthritis  The patient states that she has not been feeling well  She states that since last appointment her mother did pass away  In addition she states that she did stop hydroxychloroquine as it did result in nausea  She does however wish to restart this medication once she is feeling better as it was helpful for her pain and swelling  She does note pain in her hands and knees bilaterally  She does have swelling in her hands but denies any further obvious joint swelling  She describes morning stiffness to last a few minutes and does describe difficulty with sleep due to pain and stress  She describes nonrestorative sleep as well as fatigue   She does utilize Advil at this time with some relief  On physical examination, patient does have tenderness and synovitis of the PIPs bilaterally as well as tenderness of the DIP of the right second digit  Patient also has crepitus of bilateral knees with swelling of the left ankle  At this time patient's history and physical examination is most consistent with rheumatoid arthritis which appears to be active at this time off medication  Patient would like to restart hydroxychloroquine at this time  She will plan to obtain a CBC, CMP, CRP, and ESR  She will return to the office in 2 months time for further evaluation however contact the office in the interim if she has any further questions or concerns  Patient is still grieving over the death of her mother and was asked if she needed any further assistance dealing with the grieving process  She states that she is doing better and would not like any medication or counseling at this time  Patient was asked to contact the office if she does feel she needs further assistance with her grief  Counseling  Rheumatology Counseling Documentation: The patient was counseled regarding instructions for management, prognosis, patient and family education, risks and benefits of treatment options and importance of compliance with treatment  Chief Complaint  F/U RA   Patient is here today for follow up of chronic conditions described in HPI  History of Present Illness  Patient is in the office today for follow up for RA  feeling not so well  Stopped HCQ due to nausea  Pain in the hands and knees  +Swelling in the hands  no other obvious joint swelling  +Am stiffness x few minutes  +Difficulty with sleep due to pain and stress  +non restorative sleep  +fatigue  Taking Advil with some relief  RAPID3: 8 3 /30      Review of Systems    Constitutional: fatigue, but no fever, no recent weight gain, no chills, no recent weight loss and no anorexia     HEENT: dryness of the eyes, eye pain and feeling congested, but no blurred vision, no double vision, no amaurosis fugax, no erythema eye(s), no dryness mouth, no mouth sores and no sore throat  Cardiovascular: swelling in the arms or legs, but no chest pain or pressure and no dyspnea on exertion  Respiratory: no shortness of breath and no pleurisy    The patient presents with complaints of unusual or persistent cough, described as dry  Gastrointestinal: heartburn, but no abdominal pain, no vomiting, no diarrhea, no constipation, no melena and no BRBPR    The patient presents with complaints of nausea (with HCQ)  Genitourinary: no foamy urine, but no dysuria and no hematuria  Musculoskeletal: as noted in HPI  Integumentary no rash, no Raynaud's, no alopecia, no nail changes and no photosensitivity  Endocrine no polyuria and no polydipsia  Hematologic/Lymphatic: a tendency for easy bruising, but no unusual bleeding  Neurological: tingling, but no headache, no vertigo or dizziness and no weakness  Psychiatric: insomnia and non-restorative sleep  Active Problems    1  Allergic rhinitis (477 9) (J30 9)   2  Asthma, mild intermittent (493 90) (J45 20)   3  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)   4  Benign essential hypertension (401 1) (I10)   5  Eczema (692 9) (L30 9)   6  Encounter for routine gynecological examination (V72 31) (Z01 419)   7  Encounter for screening mammogram for malignant neoplasm of breast (V76 12)   (Z12 31)   8  GERD without esophagitis (530 81) (K21 9)   9  Grief reaction (309 0) (F43 20)   10  Hyperlipidemia (272 4) (E78 5)   11  Left ankle sprain (845 00) (S93 402A)   12  Leiomyoma of uterus (218 9) (D25 9)   13  Long-term use of hydroxychloroquine (V58 69) (Z79 899)   14  Nervousness (799 21) (R45 0)   15  NSAID long-term use (V58 64) (Z79 1)   16  Pain of thigh, unspecified laterality (729 5) (M79 659)   17  Pain, joint, ankle, left (719 47) (M25 572)   18   Primary generalized (osteo)arthritis (715 09) (M15 0)   19  Rheumatoid arthritis of multiple sites with negative rheumatoid factor (714 0) (M06 09)   20  Spider veins (448 1) (I78 1)   21  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 8) (I83 893)   22  Vitamin D deficiency (268 9) (E55 9)    Past Medical History    1  History of Abnormal findings on diagnostic imaging of breast (793 89) (R92 8)   2  Acute sinusitis (461 9) (J01 90)   3  History of Adjustment disorder with depressed mood (309 0) (F43 21)   4  History of Anxiety (300 00) (F41 9)   5  History of Blood tests for routine general physical examination (V72 62) (Z00 00)   6  History of Breast pain (611 71) (N64 4)   7  History of Colonoscopy (Fiberoptic) Screening   8  History of Cough (786 2) (R05)   9  History of Encounter for cervical Pap smear with pelvic exam (V76 2,V72 31) (Z01 419)   10  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)   11  History of Female pelvic pain (625 9) (R10 2)   12  History of  1 (V22 0) (Z34 00)   13  History of polyarthritis (V13 4) (Z87 39)   14  History of self breast exam   15  History of upper respiratory infection (V12 09) (Z87 09)   16  History of uterine leiomyoma (V13 29) (Z86 018)   17  History of Multiple joint pain (719 49) (M25 50)   18  History of Need for prophylactic vaccination and inoculation against influenza (V04 81)    (Z23)   19  History of Pain in joint of left shoulder (719 41) (M25 512)   20  History of Pain in wrist, unspecified laterality (719 43) (M25 539)   21  History of Sprained Ribs (848 3)   22  Tick bite of neck (910 4,E906 4) (Q92 66FP,N99  XXXA)   23  Upper respiratory infection (465 9) (J06 9)    The active problems and past medical history were reviewed and updated today  Surgical History    1  History of Biopsy Breast Percutaneous Needle Core   2  History of Dental Surgery   3  History of Dilation And Curettage   4  History of Hysteroscopy   5  History of Neuroplasty Median Nerve At Carpal Tunnel   6   History of Surgical Treatment For    7  History of Tonsillectomy    The surgical history was reviewed and updated today  Family History  Mother    1  Family history of Coronary Artery Disease (V17 49)   2  Family history of skin cancer (V16 8) (Z80 8)   3  Family history of varicose veins (V17 49) (Z82 49)   4  Family history of Hypertension (V17 49)   5  Family history of Uterine Cancer (V16 49)  Father    6  Family history of Coronary Artery Disease (V17 49)   7  Family history of    6  Family history of Hypertension (V17 49)   9  Family history of Stent Indications  Sister    10  Family history of colitis (V18 59) (Z83 79)  Maternal Grandmother    11  Family history of rheumatoid arthritis (V17 7) (Z82 61)  Family History    12  Denied: Family history of Crohn's disease   15  Denied: Family history of psoriasis   14  Denied: Family history of systemic lupus erythematosus   15  Denied: Family history of ulcerative colitis    The family history was reviewed and updated today  Social History    · Alcohol Use (History)   · Always uses seat belt   · Caffeine use (V49 89) (F15 90)   · Denied: History of Drug Use   · Denied: History of    ·    · Never a smoker   · No drug use   · [facility]  The social history was reviewed and updated today  The social history was reviewed and is unchanged  Current Meds   1  Aciphex 20 MG Oral Tablet Delayed Release; TAKE 1 TABLET DAILY; Therapy: (Recorded:2016) to Recorded   2  Elidel 1 % External Cream; APPLY AS DIRECTED bid; Therapy: 99KFP1477 to (Last Rx:67Paw2141)  Requested for: 83Vti9544 Ordered   3  Fluticasone Propionate 50 MCG/ACT Nasal Suspension; USE 2 SPRAYS IN EACH   NOSTRIL ONCE DAILY  Requested for: 42JMM1301; Last Rx:2017 Ordered   4  HydroCHLOROthiazide 25 MG Oral Tablet; TAKE 1 TABLET DAILY AS DIRECTED    Requested for: 00GZP3295; Last Rx:2016 Ordered   5   Xopenex HFA 45 MCG/ACT Inhalation Aerosol; INHALE 2 PUFFS EVERY 4-6 HOURS AS   NEEDED  Requested for: 03ZEM9220; Last Rx:31Jan2017 Ordered    The medication list was reviewed and updated today  Immunizations  Influenza --- Shanda Saul: Temporarily Deferred: Pt requests deferral   Tdap --- Shanda Saul: Apr 2007     Allergies    1  No Known Drug Allergies    2  No Known Environmental Allergies   3  No Known Food Allergies    Vitals  Signs   Recorded: 27MBI3534 03:44PM   Heart Rate: 80  Systolic: 356  Diastolic: 86  Weight: 170 lb 6 oz  BMI Calculated: 24 8  BSA Calculated: 1 69    Physical Exam    Constitutional   General appearance: No acute distress, well appearing and well nourished  Eyes   Conjunctiva and lids: No swelling, erythema or discharge  Pupils and irises: Equal, round and reactive to light  Ears, Nose, Mouth, and Throat   External inspection of ears and nose: Normal     Oropharynx: Normal with no erythema, edema, exudate lesions, or ulcers  Pulmonary   Respiratory effort: No increased work of breathing or signs of respiratory distress  Auscultation of lungs: Clear to auscultation  Cardiovascular   Auscultation of heart: Normal rate and rhythm, normal S1 and S2, without murmurs  Examination of extremities for edema and/or varicosities: Normal     Psychiatric   Orientation to person, place, and time: Normal     Mood and affect: Abnormal   Mood and Affect: tearful  Right 2nd Finger DIP tenderness  Left ankle swelling  Musculoskeletal - Joints, bones, and muscles: Abnormal  Palpation - bilateral knee crepitus  The patient has tenderness in all PIP joints of the right hand  The patient has tenderness in all PIP joints of the left hand  The patient has swelling of all PIP joints of the right hand  The patient has swelling of all PIP joints of the left hand     Right foot: All MTP, PIP and DIP joints are normal  Left foot: All MTP, PIP and DIP joints are normal       Health Management  Benign essential hypertension   EKG/ECG- POC; every 1 year; Last 03RET8766; Next Due: 91FMT9960; Overdue  Encounter for screening mammogram for malignant neoplasm of breast   DIGTL SCRN MAMMO W/CAD; every 1 year; Last 55OLR8175; Next Due: 05USG1337; Overdue  History of Colonoscopy (Fiberoptic) Screening   COLONOSCOPY; every 10 years; Last 59Gii5327; Next Due: 69BTT5728; Active    Attending Note  Collaborating Physician: I did not interview and examine the patient, I discussed the case with the Advanced Practitioner and reviewed the note and I agree with the Advanced Practitioner note  Future Appointments    Date/Time Provider Specialty Site   08/01/2017 05:00 PM DIANNA Julio   [address] [place] [address]   07/06/2017 03:40 PM Sana Smalls [facility] [address] [facility]     Signatures   Electronically signed by : Catracho Jacob [facility]; May  1 2017  4:06PM EST                       (Author)    Electronically signed by : Delmer Sparks DO; May  1 2017  5:42PM EST                       (Author)

## 2018-01-22 VITALS
SYSTOLIC BLOOD PRESSURE: 130 MMHG | WEIGHT: 143.38 LBS | BODY MASS INDEX: 24.8 KG/M2 | HEART RATE: 80 BPM | DIASTOLIC BLOOD PRESSURE: 86 MMHG

## 2018-01-22 VITALS
BODY MASS INDEX: 23.72 KG/M2 | HEART RATE: 84 BPM | DIASTOLIC BLOOD PRESSURE: 82 MMHG | WEIGHT: 142.38 LBS | SYSTOLIC BLOOD PRESSURE: 120 MMHG | HEIGHT: 65 IN

## 2018-01-22 VITALS
RESPIRATION RATE: 16 BRPM | DIASTOLIC BLOOD PRESSURE: 78 MMHG | HEART RATE: 70 BPM | WEIGHT: 142 LBS | BODY MASS INDEX: 24.24 KG/M2 | SYSTOLIC BLOOD PRESSURE: 120 MMHG | HEIGHT: 64 IN

## 2018-01-22 VITALS
HEART RATE: 60 BPM | HEIGHT: 64 IN | DIASTOLIC BLOOD PRESSURE: 78 MMHG | BODY MASS INDEX: 23.9 KG/M2 | WEIGHT: 140 LBS | SYSTOLIC BLOOD PRESSURE: 128 MMHG

## 2018-01-23 NOTE — PROGRESS NOTES
Assessment    1  Rheumatoid arthritis of multiple sites with negative rheumatoid factor (714 0) (M06 09)   2  Primary generalized (osteo)arthritis (715 09) (M15 0)   3  Vitamin D deficiency (268 9) (E55 9)   4  NSAID long-term use (V58 64) (Z79 1)   5  Benign essential hypertension (401 1) (I10)   6  GERD without esophagitis (530 81) (K21 9)   7  Long-term use of hydroxychloroquine (V58 69) (Z79 899)    Plan    1  Call [phone] if: The pain seems worse ; Status:Complete;   Done: 64ZXD5866   2  Call [phone] if: The symptoms seem worse ; Status:Complete;   Done:   02GLE9601   3  Call [phone] if: You have questions or concerns about your problem ;   Status:Complete;   Done: 89ITG1890   4  (1) CBC/PLT/DIFF; Status:Active; Requested for:55Vsk1914;    5  (1) COMPREHENSIVE METABOLIC PANEL; Status:Active; Requested for:81Lin4503;    6  (1) C-REACTIVE PROTEIN; Status:Active; Requested for:52Lpc0125;    7  (1) SED RATE; Status:Active; Requested for:04Tvr0710;    8  Follow-up visit in 6 months Evaluation and Treatment  Follow-up  Status: Complete    Done: 11ZEX6134    9  Advil 200 MG Oral Capsule; TAKE 2 CAPSULE Twice daily    Discussion/Summary    This is a 78-year-old female presenting today for follow-up for rheumatoid arthritis  The patient states she has been feeling fine since last appointment  She was however admitted to the hospital for severe vertigo  She will follow-up with a ENT tomorrow  She states that her joints have been doing well and she did stop hydroxychloroquine and decrease her ibuprofen  She states that she continues to note swelling in the knees and hands but denies any further obvious joint swelling  She does have morning stiffness on some days and does describe difficulty with sleep  She also notes some non restorative sleep as well as fatigue  On physical examination, there is synovitis of the PIP is bilaterally with ulnar deviation and hypertrophy of the MCPs   She does have osteoarthritic changes of both hands as well  In addition she does have swelling of both ankles with crepitus of the knees  At this time patient's history and physical examination is most consistent with rheumatoid arthritis which appears to be active at this time off of medication  Patient would like to remain off of hydroxychloroquine at this time and utilize ibuprofen as needed for joint discomfort  It was however recommended the patient restart medication for rheumatoid arthritis to prevent any future joint damage however she declined  She will plan to return to the office in 6 months time for further evaluation however contact the office in the interim if she has any further questions or concerns  She will plan to obtain a CBC, CMP, CRP, and ESR before next follow-up  The patient has the current Goals: Prevent joint damage  The patent has the current Barriers: Patient would like to remain off medications  Patient is able to Self-Care  Counseling  Rheumatology Counseling Documentation: The patient was counseled regarding instructions for management, prognosis, patient and family education, risks and benefits of treatment options and importance of compliance with treatment  Chief Complaint  F/U RA   Patient is here today for follow up of chronic conditions described in HPI  History of Present Illness  Patient is in the office today for follow up for RA  Feeling fine at this time  Admitted to hospital for severe vertigo  Joints are doing well and stopped HCQ and decreased ibuprofen  +Swelling in the hands and knees  No other obvious joint swelling  +Am stiffness on some day  +difficulty with sleep  +Some non restorative sleep  +Fatigue  RAPID3: 0 5 /30      Review of Systems    Constitutional: fatigue, but no fever, no recent weight gain, no chills, no recent weight loss and no anorexia     HEENT: eye pain and erythema eye(s), but no blurred vision, no double vision, no amaurosis fugax, no dryness mouth, no mouth sores, not feeling congested and no sore throat    The patient presents with complaints of dryness of the eyes  Symptoms are improved by artificial tears  Cardiovascular: swelling in the arms or legs, but no chest pain or pressure and no dyspnea on exertion  Respiratory: no unusual or persistent cough, no shortness of breath and no pleurisy  Gastrointestinal: no abdominal pain, no nausea, no vomiting, no heartburn, no diarrhea, no constipation, no melena and no BRBPR  Genitourinary: no foamy urine, but no dysuria and no hematuria  Musculoskeletal: as noted in HPI  Integumentary no rash, no Raynaud's, no alopecia, no nail changes and no photosensitivity  Endocrine no polyuria and no polydipsia  Hematologic/Lymphatic: a tendency for easy bruising, but no unusual bleeding  Neurological: weakness, but no headache, no vertigo or dizziness and no tingling  Psychiatric: insomnia and non-restorative sleep  Active Problems    1  Allergic rhinitis (477 9) (J30 9)   2  Asthma, mild intermittent (493 90) (J45 20)   3  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)   4  Benign essential hypertension (401 1) (I10)   5  Contact dermatitis due to plant (692 6) (L25 5)   6  Eczema (692 9) (L30 9)   7  Encounter for routine gynecological examination (V72 31) (Z01 419)   8  Encounter for screening mammogram for malignant neoplasm of breast (V76 12)   (Z12 31)   9  GERD without esophagitis (530 81) (K21 9)   10  Hyperlipidemia (272 4) (E78 5)   11  Left ankle sprain (845 00) (S93 402A)   12  Leiomyoma of uterus (218 9) (D25 9)   13  Long-term use of hydroxychloroquine (V58 69) (Z79 899)   14  Nervousness (799 21) (R45 0)   15  NSAID long-term use (V58 64) (Z79 1)   16  Pain of thigh, unspecified laterality (729 5) (M79 659)   17  Pain, joint, ankle, left (719 47) (M25 572)   18  Primary generalized (osteo)arthritis (715 09) (M15 0)   19   Rheumatoid arthritis of multiple sites with negative rheumatoid factor (714 0) (M06 09)   20  Sinus congestion (478 19) (R09 81)   21  Spider veins (448 1) (I78 1)   22  Tinnitus (388 30) (H93 19)   23  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 8) (I83 893)   24  Vertigo (780 4) (R42)   25  Vitamin D deficiency (268 9) (E55 9)    Past Medical History    1  History of Abnormal findings on diagnostic imaging of breast (793 89) (R92 8)   2  Acute sinusitis (461 9) (J01 90)   3  History of Adjustment disorder with depressed mood (309 0) (F43 21)   4  History of Anxiety (300 00) (F41 9)   5  History of Blood tests for routine general physical examination (V72 62) (Z00 00)   6  History of Breast pain (611 71) (N64 4)   7  History of Colonoscopy (Fiberoptic) Screening   8  History of Cough (786 2) (R05)   9  History of Encounter for cervical Pap smear with pelvic exam (V76 2,V72 31) (Z01 419)   10  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)   11  History of Female pelvic pain (625 9) (R10 2)   12  History of  1 (V22 0) (Z34 00)   13  History of polyarthritis (V13 4) (Z87 39)   14  History of self breast exam   15  History of upper respiratory infection (V12 09) (Z87 09)   16  History of uterine leiomyoma (V13 29) (Z86 018)   17  History of Multiple joint pain (719 49) (M25 50)   18  History of Need for prophylactic vaccination and inoculation against influenza (V04 81)    (Z23)   19  History of Pain in joint of left shoulder (719 41) (M25 512)   20  History of Pain in wrist, unspecified laterality (719 43) (M25 539)   21  History of Sprained Ribs (848 3)   22  Tick bite of neck (910 4,E906 4) (S53 28JC,N10  XXXA)   23  Upper respiratory infection (465 9) (J06 9)    The active problems and past medical history were reviewed and updated today  Surgical History    1  History of Biopsy Breast Percutaneous Needle Core   2  History of Dental Surgery   3  History of Dilation And Curettage   4  History of Hysteroscopy   5   History of Neuroplasty Median Nerve At Carpal Tunnel   6  History of Surgical Treatment For    7  History of Tonsillectomy    The surgical history was reviewed and updated today  Family History  Mother    1  Family history of Coronary Artery Disease (V17 49)   2  Family history of skin cancer (V16 8) (Z80 8)   3  Denied: Family history of substance abuse   4  Family history of varicose veins (V17 49) (Z82 49)   5  Family history of Hypertension (V17 49)   6  No family history of mental disorder   7  Family history of Uterine Cancer (V16 49)  Father    8  Family history of Coronary Artery Disease (V17 49)   9  Family history of    8  Denied: Family history of substance abuse   11  Family history of Hypertension (V17 49)   12  No family history of mental disorder   15  Family history of Stent Indications  Sister    15  Family history of colitis (V18 59) (Z83 79)  Maternal Grandmother    13  Family history of rheumatoid arthritis (V17 7) (Z82 61)  Family History    16  Denied: Family history of Crohn's disease   16  Denied: Family history of psoriasis   18  Denied: Family history of systemic lupus erythematosus   19  Denied: Family history of ulcerative colitis    The family history was reviewed and updated today  Social History    · Alcohol Use (History)   · Always uses seat belt   · Caffeine use (V49 89) (F15 90)   · Denied: History of Drug Use   · Denied: History of    ·    · Never a smoker   · No drug use   · [employer]  The social history was reviewed and updated today  The social history was reviewed and is unchanged  Current Meds   1  Aciphex 20 MG Oral Tablet Delayed Release; TAKE 1 TABLET DAILY  Requested for:   2017; Last Rx:2017 Ordered   2  Advil 200 MG Oral Capsule; TAKE 2 CAPSULE Twice daily; Therapy: (Recorded:50Rkb7427) to Recorded   3  DiazePAM 2 MG Oral Tablet; TAKE 1 TABLET  at bedtime as needed for ringing in ears;    Therapy: 78NHP2229 to (Last Rx:2017) Ordered   4  Elidel 1 % External Cream; APPLY AS DIRECTED bid; Therapy: 26GXH6404 to (Last Rx:76Jcb3612)  Requested for: 38Wun0909 Ordered   5  Fluticasone Propionate 50 MCG/ACT Nasal Suspension; USE 2 SPRAYS IN EACH   NOSTRIL ONCE DAILY  Requested for: 94ZLA4665; Last Rx:31Jan2017 Ordered   6  HydroCHLOROthiazide 25 MG Oral Tablet; TAKE 1 TABLET DAILY AS DIRECTED    Requested for: 38DKQ9457; Last Rx:15Bub3898 Ordered   7  Triamcinolone Acetonide 0 1 % External Ointment; APPLY SPARINGLY AND RUB IN   WELL TO AFFECTED AREA(S) 2-3 TIMES DAILYAS NEEDED; Therapy: 98Oer4014 to (Last Rx:29Yqy5991)  Requested for: 01Aug2017 Ordered   8  Vitamin D3 CHEW;   Therapy: (Recorded:21Nov2017) to Recorded   9  Xopenex HFA 45 MCG/ACT Inhalation Aerosol; INHALE 2 PUFFS EVERY 4-6 HOURS AS   NEEDED  Requested for: 50CBN7286; Last Rx:31Jan2017 Ordered    The medication list was reviewed and updated today  Immunizations  Influenza --- Juan Shown: Temporarily Deferred: Pt requests deferral   Tdap --- Juan Shown: Apr 2007     Allergies    1  No Known Drug Allergies    2  Seasonal    Vitals  Signs   Recorded: 11Dec2017 03:37PM   Heart Rate: 60, R Radial  Systolic: 481, LUE, Sitting  Diastolic: 78, LUE, Sitting  Height: 5 ft 4 in  Weight: 140 lb   BMI Calculated: 24 03  BSA Calculated: 1 68    Physical Exam    Constitutional   General appearance: No acute distress, well appearing and well nourished  Eyes   Conjunctiva and lids: No swelling, erythema or discharge  Pupils and irises: Equal, round and reactive to light  Ears, Nose, Mouth, and Throat   External inspection of ears and nose: Normal     Oropharynx: Normal with no erythema, edema, exudate lesions, or ulcers  Pulmonary   Respiratory effort: No increased work of breathing or signs of respiratory distress  Auscultation of lungs: Clear to auscultation  Cardiovascular   Auscultation of heart: Normal rate and rhythm, normal S1 and S2, without murmurs      Examination of extremities for edema and/or varicosities: Normal     Psychiatric   Orientation to person, place, and time: Normal     Mood and affect: Normal       Right 2nd Finger DIP tenderness  Right ankle swelling  Left ankle swelling  Right Upper Extremity: Right Hand: Right Hand Appearance: Heberden's nodule at the all DIPs digit and ulnar deviation  Right Wrist: Right Elbow: Right Shoulder: (+MCP hypertropthy )   Left Upper Extremity: Left Hand: Appearance: all DIPs digit(s) Heberden's Node(s) and ~Udigit(s) ulnar deviation  Left Wrist: Left Elbow: Left Shoulder: (+MCP hypertropthy )   Musculoskeletal - Joints, bones, and muscles: Abnormal  Palpation - bilateral knee crepitus  The patient has tenderness in all PIP joints of the right hand  The patient has tenderness in all PIP joints of the left hand  The patient has swelling of all PIP joints of the right hand  The patient has swelling of all PIP joints of the left hand  Right foot: All MTP, PIP and DIP joints are normal  Left foot: All MTP, PIP and DIP joints are normal       Health Management  Benign essential hypertension   EKG/ECG- POC; every 1 year; Last 96CJZ7823; Next Due: 38RGL0128; Overdue  Encounter for screening mammogram for malignant neoplasm of breast   DIGTL SCRN MAMMO W/CAD; every 1 year; Last 45ADB7725; Next Due: 94GWQ8317; Overdue  History of Colonoscopy (Fiberoptic) Screening   COLONOSCOPY; every 10 years; Last 63Cnp9673; Next Due: 15JFH3905; Active    Future Appointments    Date/Time Provider Specialty Site   03/20/2018 05:00 PM DIANNA Cazares  [place] [id number]   [facility] [address] [facility]     Signatures   Electronically signed by : Sirisha Kilpatrick [facility]; Dec 11 2017  4:20PM EST                       (Author)    Electronically signed by :  DIANNA Cr ; Dec 12 2017 12:39PM EST                       (Author)

## 2018-02-27 ENCOUNTER — TELEPHONE (OUTPATIENT)
Dept: FAMILY MEDICINE CLINIC | Facility: CLINIC | Age: 58
End: 2018-02-27

## 2018-02-27 NOTE — TELEPHONE ENCOUNTER
Patient stopped by with a form that needs to be faxed along with a new script for Hydrochlorothiazide tabs 25 mg     Qty: 90     I will keep the form to go with the script on my desk      Best number for Haley:  [phone]

## 2018-02-28 DIAGNOSIS — I10 ESSENTIAL HYPERTENSION: Primary | ICD-10-CM

## 2018-02-28 RX ORDER — HYDROCHLOROTHIAZIDE 25 MG/1
25 TABLET ORAL DAILY
Qty: 90 TABLET | Refills: 3 | Status: SHIPPED | OUTPATIENT
Start: 2018-02-28 | End: 2019-07-28 | Stop reason: SDUPTHER

## 2018-03-06 ENCOUNTER — TELEPHONE (OUTPATIENT)
Dept: FAMILY MEDICINE CLINIC | Facility: CLINIC | Age: 58
End: 2018-03-06

## 2018-03-06 DIAGNOSIS — I10 ESSENTIAL HYPERTENSION: ICD-10-CM

## 2018-03-06 RX ORDER — RABEPRAZOLE SODIUM 20 MG/1
1 TABLET, DELAYED RELEASE ORAL DAILY
COMMUNITY
End: 2018-03-06 | Stop reason: SDUPTHER

## 2018-03-20 ENCOUNTER — OFFICE VISIT (OUTPATIENT)
Dept: FAMILY MEDICINE CLINIC | Facility: CLINIC | Age: 58
End: 2018-03-20
Payer: COMMERCIAL

## 2018-03-20 ENCOUNTER — TELEPHONE (OUTPATIENT)
Dept: FAMILY MEDICINE CLINIC | Facility: CLINIC | Age: 58
End: 2018-03-20

## 2018-03-20 VITALS
BODY MASS INDEX: 24.66 KG/M2 | HEART RATE: 76 BPM | DIASTOLIC BLOOD PRESSURE: 60 MMHG | RESPIRATION RATE: 14 BRPM | SYSTOLIC BLOOD PRESSURE: 112 MMHG | HEIGHT: 65 IN | WEIGHT: 148 LBS

## 2018-03-20 DIAGNOSIS — M06.09 RHEUMATOID ARTHRITIS OF MULTIPLE SITES WITH NEGATIVE RHEUMATOID FACTOR (HCC): ICD-10-CM

## 2018-03-20 DIAGNOSIS — J45.20 MILD INTERMITTENT ASTHMA WITHOUT COMPLICATION: ICD-10-CM

## 2018-03-20 DIAGNOSIS — K21.9 GASTROESOPHAGEAL REFLUX DISEASE WITHOUT ESOPHAGITIS: ICD-10-CM

## 2018-03-20 DIAGNOSIS — I10 ESSENTIAL HYPERTENSION: ICD-10-CM

## 2018-03-20 DIAGNOSIS — R53.83 FATIGUE, UNSPECIFIED TYPE: Primary | ICD-10-CM

## 2018-03-20 PROBLEM — H93.19 TINNITUS: Status: ACTIVE | Noted: 2017-11-21

## 2018-03-20 PROCEDURE — 99214 OFFICE O/P EST MOD 30 MIN: CPT | Performed by: FAMILY MEDICINE

## 2018-03-20 RX ORDER — PIMECROLIMUS 1 %
CREAM (GRAM) TOPICAL
COMMUNITY
Start: 2012-11-16 | End: 2021-05-04 | Stop reason: SDUPTHER

## 2018-03-20 RX ORDER — LEVALBUTEROL TARTRATE 45 UG/1
2 AEROSOL, METERED ORAL
COMMUNITY
End: 2018-03-20 | Stop reason: ALTCHOICE

## 2018-03-20 RX ORDER — RABEPRAZOLE SODIUM 20 MG/1
20 TABLET, DELAYED RELEASE ORAL DAILY
COMMUNITY
End: 2018-06-27 | Stop reason: SDUPTHER

## 2018-03-20 RX ORDER — DIAZEPAM 2 MG/1
TABLET ORAL
COMMUNITY
Start: 2017-11-21 | End: 2018-03-20 | Stop reason: ALTCHOICE

## 2018-03-20 RX ORDER — HYDROCHLOROTHIAZIDE 25 MG/1
1 TABLET ORAL DAILY
COMMUNITY
End: 2018-03-20 | Stop reason: ALTCHOICE

## 2018-03-20 RX ORDER — OMEGA-3 FATTY ACIDS/FISH OIL 300-1000MG
2 CAPSULE ORAL 2 TIMES DAILY
COMMUNITY

## 2018-03-20 RX ORDER — HYDROXYCHLOROQUINE SULFATE 200 MG/1
2 TABLET, FILM COATED ORAL
COMMUNITY
Start: 2017-05-01 | End: 2018-03-20 | Stop reason: ALTCHOICE

## 2018-03-20 RX ORDER — CALCIUM CARBONATE 300MG(750)
TABLET,CHEWABLE ORAL
COMMUNITY

## 2018-03-20 RX ORDER — FLUTICASONE PROPIONATE 50 MCG
2 SPRAY, SUSPENSION (ML) NASAL DAILY
COMMUNITY
End: 2018-03-20 | Stop reason: ALTCHOICE

## 2018-03-20 NOTE — TELEPHONE ENCOUNTER
Patient forgot to ask Dr Marianela Mason for an order for Mammo  She uses Danelle Johnson      Best number for Cody Coma:  [phone]

## 2018-03-22 NOTE — PROGRESS NOTES
Assessment/Plan:  1  Fatigue, unspecified type    - Urinalysis with reflex to microscopic  - CBC and differential  - Comprehensive metabolic panel  - TSH, 3rd generation with T4 reflex  - Lyme Antibody Profile with reflex to WB    2  Essential hypertension- well controlled  - continue HCTZ 25 mg daily    3  Mild intermittent asthma without complication  - continue Xopenex inhaler as needed, uses rarely    4  Gastroesophageal reflux disease without esophagitis  - continue Aciphex 20 mg daily, doing well    5  Rheumatoid arthritis of multiple sites with negative rheumatoid factor ([facility] [address] )  - f/u with Rheumatology    RTO in 6 months or sooner if needed  The treatment plan was reviewed with the patient/guardian  The patient/guardian understands and agrees with the treatment plan      Subjective:   Chief Complaint   Patient presents with    Follow-up      Patient ID: Shayne Herndon is a 59 y o  female  Fatigue   This is a new problem  The current episode started 1 to 4 weeks ago  Pertinent negatives include no abdominal pain, anorexia, change in bowel habit, chest pain, chills, coughing, fatigue, fever, headaches, nausea, numbness, rash, sore throat, swollen glands, vomiting or weakness  Associated symptoms comments: She had a tick bite in the fall  Past Medical History:   Diagnosis Date    Abnormal findings on diagnostic imaging of breast     Last assessed 08/16/13    Adjustment disorder with depressed mood     Last assessed 05/17/13    Anxiety     Arthritis     hands    Erythema migrans (Lyme disease)     Last assessed 06/29/13    Polyarthritis     Last assessed 02/17/16    Uterine leiomyoma      Past Surgical History:   Procedure Laterality Date    BREAST BIOPSY  02/2002    BREAST SURGERY      CARPAL TUNNEL RELEASE Bilateral 10/2008    DENTAL SURGERY      DILATION AND CURETTAGE OF UTERUS      HYSTEROSCOPY      w/D and C on 11/20/06 for irregular menses    Her pathology demonstrated proliferative endometrium    INDUCED       TONSILLECTOMY      WRIST SURGERY Right 2009     Family History   Problem Relation Age of Onset    Arthritis Mother     Hypertension Mother     Coronary artery disease Mother     Skin cancer Mother     Varicose Veins Mother     Uterine cancer Mother     Arthritis Father     Hypertension Father     Stroke Father     Coronary artery disease Father     Other Father      Stent indications    Ulcerative colitis Sister     Rheum arthritis Maternal Grandmother      Social History     Social History    Marital status: /Civil Union     Spouse name: N/A    Number of children: N/A    Years of education: N/A     Occupational History    Not on file       Social History Main Topics    Smoking status: Never Smoker    Smokeless tobacco: Never Used    Alcohol use Yes      Comment: social- 2-3 glasses of wine    Drug use: No    Sexual activity: Not on file     Other Topics Concern    Not on file     Social History Narrative    Caffeine Use 1 cup per day    Per allscripts: Denied: History of [place]    Hindu       Current Outpatient Prescriptions:     Cholecalciferol (VITAMIN D3) 1000 units CHEW, Chew, Disp: , Rfl:     fluticasone (FLONASE) 50 mcg/act nasal spray, 1 spray into each nostril daily as needed for rhinitis, Disp: , Rfl:     hydrochlorothiazide (HYDRODIURIL) 25 mg tablet, Take 1 tablet (25 mg total) by mouth daily, Disp: 90 tablet, Rfl: 3    Ibuprofen (ADVIL) 200 MG CAPS, Take 2 capsules by mouth 2 (two) times a day, Disp: , Rfl:     levalbuterol (XOPENEX HFA) 45 mcg/act inhaler, Inhale 1-2 puffs every 4 (four) hours as needed for wheezing, Disp: , Rfl:     meclizine (ANTIVERT) 25 mg tablet, Take 1 tablet by mouth every 8 (eight) hours as needed for dizziness, Disp: 10 tablet, Rfl: 0    pimecrolimus (ELIDEL) 1 % cream, Apply topically, Disp: , Rfl:     RABEprazole (ACIPHEX) 20 MG tablet, Take 20 mg by mouth daily, Disp: , Rfl: Review of Systems   Constitutional: Negative for appetite change, chills, fatigue, fever and unexpected weight change  HENT: Negative for sore throat  Respiratory: Negative for cough, shortness of breath and wheezing  Cardiovascular: Negative for chest pain, palpitations and leg swelling  Gastrointestinal: Negative for abdominal pain, anorexia, blood in stool, change in bowel habit, constipation, diarrhea, nausea and vomiting  Genitourinary: Negative for dysuria, flank pain, frequency, hematuria, pelvic pain, urgency and vaginal discharge  Skin: Negative for rash  Neurological: Negative for dizziness, syncope, weakness, numbness and headaches  Hematological: Negative for adenopathy  Psychiatric/Behavioral: Negative for dysphoric mood and sleep disturbance  The patient is not nervous/anxious  Objective:    Vitals:    03/20/18 1651   BP: 112/60   BP Location: Left arm   Patient Position: Sitting   Pulse: 76   Resp: 14   Weight: 67 1 kg (148 lb)   Height: 5' 4 5" (1 638 m)        Physical Exam   Constitutional: She is oriented to person, place, and time  She appears well-developed and well-nourished  No distress  Neck: Neck supple  No thyromegaly present  Cardiovascular: Normal rate, regular rhythm and normal heart sounds  No murmur heard  Pulmonary/Chest: Effort normal  No respiratory distress  She has no wheezes  She has no rhonchi  She has no rales  Abdominal: Soft  She exhibits no distension and no mass  There is no tenderness  Musculoskeletal: She exhibits no edema  Lymphadenopathy:     She has no cervical adenopathy  Neurological: She is alert and oriented to person, place, and time  Psychiatric: She has a normal mood and affect   Her behavior is normal  Thought content normal

## 2018-03-23 DIAGNOSIS — Z12.39 SCREENING FOR MALIGNANT NEOPLASM OF BREAST: Primary | ICD-10-CM

## 2018-04-02 LAB
ALBUMIN SERPL-MCNC: 4.3 G/DL (ref 3.6–5.1)
ALBUMIN/GLOB SERPL: 2.2 (CALC) (ref 1–2.5)
ALP SERPL-CCNC: 74 U/L (ref 33–130)
ALT SERPL-CCNC: 15 U/L (ref 6–29)
APPEARANCE UR: CLEAR
AST SERPL-CCNC: 19 U/L (ref 10–35)
B BURGDOR AB SER IA-ACNC: <0.9 INDEX
BASOPHILS # BLD AUTO: 49 CELLS/UL (ref 0–200)
BASOPHILS NFR BLD AUTO: 1.3 %
BILIRUB SERPL-MCNC: 0.8 MG/DL (ref 0.2–1.2)
BILIRUB UR QL STRIP: NEGATIVE
BUN SERPL-MCNC: 13 MG/DL (ref 7–25)
BUN/CREAT SERPL: NORMAL (CALC) (ref 6–22)
CALCIUM SERPL-MCNC: 9.5 MG/DL (ref 8.6–10.4)
CHLORIDE SERPL-SCNC: 102 MMOL/L (ref 98–110)
CO2 SERPL-SCNC: 30 MMOL/L (ref 20–31)
COLOR UR: YELLOW
CREAT SERPL-MCNC: 0.91 MG/DL (ref 0.5–0.99)
EOSINOPHIL # BLD AUTO: 80 CELLS/UL (ref 15–500)
EOSINOPHIL NFR BLD AUTO: 2.1 %
ERYTHROCYTE [DISTWIDTH] IN BLOOD BY AUTOMATED COUNT: 11 % (ref 11–15)
GLOBULIN SER CALC-MCNC: 2 G/DL (CALC) (ref 1.9–3.7)
GLUCOSE SERPL-MCNC: 87 MG/DL (ref 65–99)
GLUCOSE UR QL STRIP: NEGATIVE
HCT VFR BLD AUTO: 39.2 % (ref 35–45)
HGB BLD-MCNC: 13.6 G/DL (ref 11.7–15.5)
HGB UR QL STRIP: NEGATIVE
KETONES UR QL STRIP: NEGATIVE
LEUKOCYTE ESTERASE UR QL STRIP: NEGATIVE
LYMPHOCYTES # BLD AUTO: 1015 CELLS/UL (ref 850–3900)
LYMPHOCYTES NFR BLD AUTO: 26.7 %
MCH RBC QN AUTO: 34.3 PG (ref 27–33)
MCHC RBC AUTO-ENTMCNC: 34.7 G/DL (ref 32–36)
MCV RBC AUTO: 98.7 FL (ref 80–100)
MONOCYTES # BLD AUTO: 365 CELLS/UL (ref 200–950)
MONOCYTES NFR BLD AUTO: 9.6 %
NEUTROPHILS # BLD AUTO: 2291 CELLS/UL (ref 1500–7800)
NEUTROPHILS NFR BLD AUTO: 60.3 %
NITRITE UR QL STRIP: NEGATIVE
PH UR STRIP: 7.5 [PH] (ref 5–8)
PLATELET # BLD AUTO: 236 THOUSAND/UL (ref 140–400)
PMV BLD REES-ECKER: 9.8 FL (ref 7.5–12.5)
POTASSIUM SERPL-SCNC: 3.8 MMOL/L (ref 3.5–5.3)
PROT SERPL-MCNC: 6.3 G/DL (ref 6.1–8.1)
PROT UR QL STRIP: NEGATIVE
RBC # BLD AUTO: 3.97 MILLION/UL (ref 3.8–5.1)
SL AMB EGFR AFRICAN AMERICAN: 79 ML/MIN/1.73M2
SL AMB EGFR NON AFRICAN AMERICAN: 69 ML/MIN/1.73M2
SODIUM SERPL-SCNC: 140 MMOL/L (ref 135–146)
SP GR UR STRIP: 1.01 (ref 1–1.03)
TSH SERPL-ACNC: 0.72 MIU/L (ref 0.4–4.5)
WBC # BLD AUTO: 3.8 THOUSAND/UL (ref 3.8–10.8)

## 2018-04-03 LAB
CRP SERPL-MCNC: 0.9 MG/L
ERYTHROCYTE [SEDIMENTATION RATE] IN BLOOD BY WESTERGREN METHOD: 6 MM/H

## 2018-06-07 NOTE — PROGRESS NOTES
No problem-specific Assessment & Plan notes found for this encounter  Plan:  Diagnoses and all orders for this visit:    Rheumatoid arthritis of multiple sites with negative rheumatoid factor ([facility] [address] )    Primary generalized (osteo)arthritis    Gastroesophageal reflux disease without esophagitis    Mild intermittent asthma without complication      Assessment: This is a 70-year-old female presenting today for follow-up for rheumatoid arthritis as well as osteoarthritis  The patient states that she continues to have pain in her hands as well as her knees and does describe her knee pain to be worse on the right side  She notes swelling in the hands as well as the right knee but denies any further obvious joint swelling  She has morning stiffness lasting a few minutes and does describe difficulty with sleep not related to pain  She does note non restorative sleep as well as fatigue  She has been utilizing ibuprofen with some relief however has stop hydroxychloroquine  On physical examination, patient does have swelling and synovitis of the MCPs and PIP is bilaterally with tenderness of the 2nd and 3rd MCPs of the left hand with bony hypertrophy of the MCPs bilaterally  In addition she does have tenderness of the 5th PIP of the right hand  Patient has normal passive range of motion of both upper and lower extremities with crepitus of the knees and swelling of both lower extremities  Her most recent labs reveal a CBC, CMP, CRP, and ESR to be within normal range  At this time patient's history and physical examination is most consistent with rheumatoid arthritis which appears to be active at this time and not well controlled off medication  Patient would not like to start any new medications at this time however will contact the office if she would like to proceed with hydroxychloroquine in the future  In addition patient has been utilizing ibuprofen and would like to continue to do so    Patient is aware of the risks of not utilizing medication for her RA  She will plan to return to the office in 6 months time and obtain a CBC, CMP, CRP, ESR before that appointment  She will contact the office if she has any further questions or concerns  The patient will be reviewed and discussed with Dr Manfred Guo on a biweekly basis  Subjective:      Patient ID: Jason Dhillon is a 59 y o   female presenting today for follow up for RA/OA  Still with pain in the hands and the knees, more so on the right side  +Swelling in the hands and the right knee  No other obvious joint swelling  +Am stiffness x few minutes  +Difficulty with sleep not related to pain  +Non restorative sleep   +fatigue  Taking Ibuprofen  No longer of HCQ  The following portions of the patient's history were reviewed and updated as appropriate: She  has a past medical history of Abnormal findings on diagnostic imaging of breast; Adjustment disorder with depressed mood; Anxiety; Arthritis; Erythema migrans (Lyme disease); Polyarthritis; and Uterine leiomyoma     Review of Systems   Constitutional: Negative for appetite change, chills, fatigue, fever and unexpected weight change  HENT: Negative for congestion, mouth sores and sore throat  Eyes: Negative for pain, redness and visual disturbance  Respiratory: Negative for cough, chest tightness and shortness of breath  Cardiovascular: Positive for leg swelling  Negative for chest pain  Gastrointestinal: Negative for abdominal pain, blood in stool, constipation, diarrhea, nausea and vomiting  Endocrine: Negative for polydipsia and polyuria  Genitourinary: Negative for frequency and hematuria  Skin: Negative for color change and rash  Neurological: Positive for dizziness (vertigo )  Negative for weakness, light-headedness, numbness and headaches  Hematological: Negative for adenopathy  Psychiatric/Behavioral: Negative for behavioral problems   The patient is not nervous/anxious  Objective:    Physical Exam   Constitutional: She is oriented to person, place, and time  She appears well-developed and well-nourished  HENT:   Head: Normocephalic  Mouth/Throat: Oropharynx is clear and moist    Eyes: Conjunctivae are normal  Pupils are equal, round, and reactive to light  Neck: Normal range of motion  Neck supple  Cardiovascular: Normal rate, regular rhythm and normal heart sounds  Pulmonary/Chest: Effort normal and breath sounds normal    Musculoskeletal: Normal range of motion  Crepitus of the knees B/L  Neurological: She is alert and oriented to person, place, and time  Skin: Skin is warm and dry  Psychiatric: She has a normal mood and affect   Her behavior is normal        Physical Exam     Tenderness:   Right hand: 5th PIP  Left hand: 2nd MCP and 3rd MCP    Swelling:   Right hand: 2nd MCP, 3rd MCP, 4th MCP, 5th MCP, 2nd PIP, 3rd PIP, 4th PIP and 5th PIP  Left hand: 2nd MCP, 3rd MCP, 4th MCP, 5th MCP, 2nd PIP, 3rd PIP, 4th PIP and 5th PIP  RLE: tibiotalar  LLE: tibiotalar    ERIC-28 tender joint count: 3  ERIC-28 swollen joint count: 16      Results Reviewed     None

## 2018-06-11 ENCOUNTER — OFFICE VISIT (OUTPATIENT)
Dept: RHEUMATOLOGY | Facility: CLINIC | Age: 58
End: 2018-06-11
Payer: COMMERCIAL

## 2018-06-11 VITALS
DIASTOLIC BLOOD PRESSURE: 86 MMHG | BODY MASS INDEX: 24.29 KG/M2 | HEIGHT: 65 IN | WEIGHT: 145.8 LBS | SYSTOLIC BLOOD PRESSURE: 124 MMHG

## 2018-06-11 DIAGNOSIS — J45.20 MILD INTERMITTENT ASTHMA WITHOUT COMPLICATION: ICD-10-CM

## 2018-06-11 DIAGNOSIS — M15.0 PRIMARY GENERALIZED (OSTEO)ARTHRITIS: ICD-10-CM

## 2018-06-11 DIAGNOSIS — M06.09 RHEUMATOID ARTHRITIS OF MULTIPLE SITES WITH NEGATIVE RHEUMATOID FACTOR (HCC): Primary | ICD-10-CM

## 2018-06-11 DIAGNOSIS — K21.9 GASTROESOPHAGEAL REFLUX DISEASE WITHOUT ESOPHAGITIS: ICD-10-CM

## 2018-06-11 PROCEDURE — 99214 OFFICE O/P EST MOD 30 MIN: CPT | Performed by: PHYSICIAN ASSISTANT

## 2018-06-26 ENCOUNTER — TELEPHONE (OUTPATIENT)
Dept: FAMILY MEDICINE CLINIC | Facility: CLINIC | Age: 58
End: 2018-06-26

## 2018-06-26 NOTE — TELEPHONE ENCOUNTER
Mail order called, they are giving the patient the generic for aciphex, it will save her 300 00  ok'd per patient

## 2018-06-27 DIAGNOSIS — K21.9 GASTROESOPHAGEAL REFLUX DISEASE, ESOPHAGITIS PRESENCE NOT SPECIFIED: Primary | ICD-10-CM

## 2018-06-27 RX ORDER — RABEPRAZOLE SODIUM 20 MG/1
20 TABLET, DELAYED RELEASE ORAL DAILY
Qty: 90 TABLET | Refills: 3 | Status: SHIPPED | OUTPATIENT
Start: 2018-06-27 | End: 2018-07-26

## 2018-06-27 NOTE — TELEPHONE ENCOUNTER
You are correct, the patient can only use ACIPHEX, not the generic  The rx has to be resent, as the patient called them yesterday and put a stop to the delivery   She said this is a new mail order that they are dealing with and she is having so much trouble with them      [address]

## 2018-07-26 ENCOUNTER — TRANSCRIBE ORDERS (OUTPATIENT)
Dept: RADIOLOGY | Facility: HOSPITAL | Age: 58
End: 2018-07-26

## 2018-07-26 ENCOUNTER — HOSPITAL ENCOUNTER (OUTPATIENT)
Dept: RADIOLOGY | Facility: HOSPITAL | Age: 58
Discharge: HOME | End: 2018-07-26
Payer: COMMERCIAL

## 2018-07-26 ENCOUNTER — OFFICE VISIT (OUTPATIENT)
Dept: FAMILY MEDICINE CLINIC | Facility: CLINIC | Age: 58
End: 2018-07-26
Payer: COMMERCIAL

## 2018-07-26 VITALS
SYSTOLIC BLOOD PRESSURE: 118 MMHG | HEIGHT: 64 IN | RESPIRATION RATE: 18 BRPM | WEIGHT: 144 LBS | BODY MASS INDEX: 24.59 KG/M2 | DIASTOLIC BLOOD PRESSURE: 68 MMHG | HEART RATE: 68 BPM

## 2018-07-26 DIAGNOSIS — S20.211A CONTUSION OF RIGHT CHEST WALL, INITIAL ENCOUNTER: ICD-10-CM

## 2018-07-26 DIAGNOSIS — K21.9 GERD WITHOUT ESOPHAGITIS: Primary | ICD-10-CM

## 2018-07-26 DIAGNOSIS — S90.121A CONTUSION OF LESSER TOE OF RIGHT FOOT WITHOUT DAMAGE TO NAIL, INITIAL ENCOUNTER: ICD-10-CM

## 2018-07-26 PROCEDURE — 3008F BODY MASS INDEX DOCD: CPT | Performed by: NURSE PRACTITIONER

## 2018-07-26 PROCEDURE — 71111 X-RAY EXAM RIBS/CHEST4/> VWS: CPT

## 2018-07-26 PROCEDURE — 99214 OFFICE O/P EST MOD 30 MIN: CPT | Performed by: NURSE PRACTITIONER

## 2018-07-26 RX ORDER — RABEPRAZOLE SODIUM 20 MG
20 TABLET, DELAYED RELEASE (ENTERIC COATED) ORAL DAILY
Qty: 90 TABLET | Refills: 3 | Status: SHIPPED | OUTPATIENT
Start: 2018-07-26 | End: 2021-05-04 | Stop reason: ALTCHOICE

## 2018-07-26 RX ORDER — TOBRAMYCIN AND DEXAMETHASONE 3; 1 MG/ML; MG/ML
SUSPENSION/ DROPS OPHTHALMIC
Refills: 0 | COMMUNITY
Start: 2018-06-29 | End: 2018-07-26 | Stop reason: ALTCHOICE

## 2018-07-26 RX ORDER — LIDOCAINE 50 MG/G
1 PATCH TOPICAL DAILY
Qty: 30 PATCH | Refills: 0 | Status: SHIPPED | OUTPATIENT
Start: 2018-07-26 | End: 2018-09-25 | Stop reason: ALTCHOICE

## 2018-07-26 NOTE — PROGRESS NOTES
Assessment/Plan:      1  Contusion of right chest wall, initial encounter  You can apply ice to the chest wall as well as use the lidocaine patch  We will call you with the results of the chest xray  - XR ribs bilateral 4+ vw w pa chest; Future  - lidocaine (LIDODERM) 5 %; Place 1 patch on the skin daily Remove & Discard patch within 12 hours or as directed by MD  Dispense: 30 patch; Refill: 0    2  Contusion of lesser toe of right foot without damage to nail, initial encounter  Please keep this casimiro taped to the adjacent toe  You can take ibuprofen and you  Can apply ice to the toe  rto prn     Subjective:      Patient ID: Makayla Butler is a 59 y o  female  On 7/23/2018 went out on her deck and as she was going past the large heater her   Foot went under the wheels and went airborn  into the air  Slammed down onto the deck   Landed on her R side  Her fist was against her  chest wall  Got up and went in to the house  Hurt the toes on her R foot  Felt warm inside her R chest wall  Took a warm bath and took Ibuprofen every 3 hours  Went to work the next day and was sore all over but yesterday was feeling increased pain and stiffness  concern is her toes and the R chest wall pain when she takes a deep breathe  Rates the pain as 9 with a deep breath  No cough or SOB        OBJECTIVE  Past Medical History:   Diagnosis Date    Abnormal findings on diagnostic imaging of breast     Last assessed 08/16/13    Adjustment disorder with depressed mood     Last assessed 05/17/13    Anxiety     Arthritis     hands    Erythema migrans (Lyme disease)     Last assessed 06/29/13    Polyarthritis     Last assessed 02/17/16    Uterine leiomyoma      @PS      Wt Readings from Last 3 Encounters:   07/26/18 65 3 kg (144 lb)   06/11/18 66 1 kg (145 lb 12 8 oz)   03/20/18 67 1 kg (148 lb)     BP Readings from Last 3 Encounters:   07/26/18 118/68   06/11/18 124/86   03/20/18 112/60     Pulse Readings from Last 3 Encounters:   07/26/18 68   03/20/18 76   12/11/17 60     BMI Readings from Last 3 Encounters:   07/26/18 24 91 kg/m²   06/11/18 24 64 kg/m²   03/20/18 25 01 kg/m²        Physical Exam   Constitutional: She appears well-developed and well-nourished  Neck: Normal range of motion  Neck supple  Cardiovascular: Normal rate and regular rhythm  Pulmonary/Chest: Effort normal and breath sounds normal    Musculoskeletal:   Pain across the R anterior chest wall with deep breath as well as to palpation  R 5th toe with erythema and ecchymosis  Has mild swelling  Skin: Skin is warm and dry  Psychiatric: She has a normal mood and affect   Her behavior is normal  Judgment and thought content normal

## 2018-07-31 ENCOUNTER — TELEPHONE (OUTPATIENT)
Dept: FAMILY MEDICINE CLINIC | Facility: CLINIC | Age: 58
End: 2018-07-31

## 2018-07-31 NOTE — TELEPHONE ENCOUNTER
----- Message from Gregorio Moctezuma Dr sent at 7/31/2018  7:45 AM EDT -----  Call pt: xray ribs no fracture/

## 2018-09-25 ENCOUNTER — OFFICE VISIT (OUTPATIENT)
Dept: FAMILY MEDICINE CLINIC | Facility: CLINIC | Age: 58
End: 2018-09-25
Payer: COMMERCIAL

## 2018-09-25 VITALS
HEART RATE: 80 BPM | RESPIRATION RATE: 16 BRPM | DIASTOLIC BLOOD PRESSURE: 82 MMHG | WEIGHT: 145.4 LBS | BODY MASS INDEX: 24.22 KG/M2 | SYSTOLIC BLOOD PRESSURE: 128 MMHG | HEIGHT: 65 IN

## 2018-09-25 DIAGNOSIS — I10 ESSENTIAL HYPERTENSION: Primary | ICD-10-CM

## 2018-09-25 DIAGNOSIS — M06.09 RHEUMATOID ARTHRITIS OF MULTIPLE SITES WITH NEGATIVE RHEUMATOID FACTOR (HCC): ICD-10-CM

## 2018-09-25 DIAGNOSIS — L98.9 NON-HEALING SKIN LESION OF NOSE: ICD-10-CM

## 2018-09-25 DIAGNOSIS — K21.9 GERD WITHOUT ESOPHAGITIS: ICD-10-CM

## 2018-09-25 DIAGNOSIS — J45.20 MILD INTERMITTENT ASTHMA WITHOUT COMPLICATION: ICD-10-CM

## 2018-09-25 PROCEDURE — 99214 OFFICE O/P EST MOD 30 MIN: CPT | Performed by: FAMILY MEDICINE

## 2018-09-25 PROCEDURE — 3074F SYST BP LT 130 MM HG: CPT | Performed by: FAMILY MEDICINE

## 2018-09-25 PROCEDURE — 3079F DIAST BP 80-89 MM HG: CPT | Performed by: FAMILY MEDICINE

## 2018-09-26 NOTE — PROGRESS NOTES
Assessment/Plan:    1  Essential hypertension  - well controlled, continue HCTZ 25 mg daily  - Lipid Panel with Direct LDL reflex; Future    2  GERD without esophagitis  - continue Aciphex 20 mg daily, doing well    3  Mild intermittent asthma without complication  - continue Xopenex inhaler as needed, discussed flu vaccine which Pt declined    4  Non-healing skin lesion of nose  - will obtain nose culture and call with results  - MRSA culture    5  Rheumatoid arthritis of multiple sites with negative rheumatoid factor ([facility] [address] )  - follow up with Rheumatology    RTO in 6 months or sooner as needed  The treatment plan was reviewed with the patient  The patient understands and agrees with the treatment plan      Subjective:   Chief Complaint   Patient presents with    Hypertension    Asthma    GERD      Patient ID: Timi Washburn is a 59 y o  female here today for a follow up visit for her chronic medical conditions  She is c/o non healing lesion in his right nostril for the past 2-3 weeks, no runny nose, no nasal congestion, no history of recurrent cold sores  Patient continues having joint aches in her hands, she is following with Rheumatology for RA and taking Advil as needed for pain  Patient has no other complaints, she has been compliant with her medications and reports no recent need for her Xpenex inhaler       HPI      Past Medical History:   Diagnosis Date    Abnormal findings on diagnostic imaging of breast     Last assessed 08/16/13    Adjustment disorder with depressed mood     Last assessed 05/17/13    Anxiety     Arthritis     hands    Erythema migrans (Lyme disease)     Last assessed 06/29/13    Polyarthritis     Last assessed 02/17/16    Uterine leiomyoma      Past Surgical History:   Procedure Laterality Date    BREAST BIOPSY  02/2002    BREAST SURGERY      CARPAL TUNNEL RELEASE Bilateral 10/2008    DENTAL SURGERY      DILATION AND CURETTAGE OF UTERUS      HYSTEROSCOPY      w/D and C on 06 for irregular menses  Her pathology demonstrated proliferative endometrium    INDUCED       TONSILLECTOMY      WRIST SURGERY Right 2009     Family History   Problem Relation Age of Onset    Arthritis Mother     Hypertension Mother     Coronary artery disease Mother     Skin cancer Mother     Varicose Veins Mother     Uterine cancer Mother     Arthritis Father     Hypertension Father     Stroke Father     Coronary artery disease Father     Other Father         Stent indications    Ulcerative colitis Sister     Rheum arthritis Maternal Grandmother     Substance Abuse Neg Hx     Mental illness Neg Hx      Social History     Social History    Marital status: /Civil Union     Spouse name: N/A    Number of children: N/A    Years of education: N/A     Occupational History    Not on file       Social History Main Topics    Smoking status: Never Smoker    Smokeless tobacco: Never Used    Alcohol use Yes      Comment: social- 2-3 glasses of wine    Drug use: No    Sexual activity: Not on file     Other Topics Concern    Not on file     Social History Narrative    Caffeine Use 1 cup per day    Per allscripts: Denied: History of [place]    Moravian       Current Outpatient Prescriptions:     ACIPHEX 20 MG tablet, Take 1 tablet (20 mg total) by mouth daily, Disp: 90 tablet, Rfl: 3    Cholecalciferol (VITAMIN D3) 1000 units CHEW, Chew, Disp: , Rfl:     fluticasone (FLONASE) 50 mcg/act nasal spray, 1 spray into each nostril daily as needed for rhinitis, Disp: , Rfl:     hydrochlorothiazide (HYDRODIURIL) 25 mg tablet, Take 1 tablet (25 mg total) by mouth daily, Disp: 90 tablet, Rfl: 3    Ibuprofen (ADVIL) 200 MG CAPS, Take 2 capsules by mouth 2 (two) times a day, Disp: , Rfl:     levalbuterol (XOPENEX HFA) 45 mcg/act inhaler, Inhale 1-2 puffs every 4 (four) hours as needed for wheezing, Disp: , Rfl:     meclizine (ANTIVERT) 25 mg tablet, Take 1 tablet by mouth every 8 (eight) hours as needed for dizziness, Disp: 10 tablet, Rfl: 0    pimecrolimus (ELIDEL) 1 % cream, Apply topically, Disp: , Rfl:     Review of Systems   Constitutional: Negative for appetite change, chills, fatigue, fever and unexpected weight change  HENT: Negative for congestion, ear discharge, ear pain, mouth sores, postnasal drip, rhinorrhea and sore throat  Respiratory: Negative for cough, shortness of breath and wheezing  Cardiovascular: Negative for chest pain, palpitations and leg swelling  Gastrointestinal: Negative for abdominal pain, blood in stool, constipation, diarrhea, nausea and vomiting  Genitourinary: Negative for dysuria, flank pain, frequency, hematuria, pelvic pain, urgency and vaginal discharge  Musculoskeletal:        As noted in HPI   Neurological: Negative for dizziness, syncope, weakness, numbness and headaches  Hematological: Negative for adenopathy  Psychiatric/Behavioral: Negative for dysphoric mood and sleep disturbance  The patient is not nervous/anxious  Objective:    Vitals:    09/25/18 1832   BP: 128/82   BP Location: Left arm   Patient Position: Sitting   Cuff Size: Standard   Pulse: 80   Resp: 16   Weight: 66 kg (145 lb 6 4 oz)   Height: 5' 4 5" (1 638 m)        Physical Exam   Constitutional: She is oriented to person, place, and time  She appears well-developed and well-nourished  No distress  HENT:   Nose: No mucosal edema or rhinorrhea  Right nostril with crusted lesion   Neck: Neck supple  No thyromegaly present  Cardiovascular: Normal rate, regular rhythm and normal heart sounds  No murmur heard  Pulmonary/Chest: Effort normal  No respiratory distress  She has no wheezes  She has no rhonchi  She has no rales  Abdominal: Soft  She exhibits no distension and no mass  There is no tenderness  Musculoskeletal: She exhibits no edema  Lymphadenopathy:     She has no cervical adenopathy     Neurological: She is alert and oriented to person, place, and time  Psychiatric: She has a normal mood and affect   Her behavior is normal  Thought content normal          Lab Results   Component Value Date     11/15/2017    K 4 6 11/15/2017     03/30/2018    CO2 30 03/30/2018    BUN 13 03/30/2018    CREATININE 0 91 03/30/2018    GLUF 98 11/15/2017    CALCIUM 9 5 03/30/2018    AST 29 11/14/2017    ALT 30 11/14/2017    ALKPHOS 74 03/30/2018    PROT 6 6 07/01/2017    BILITOT 0 6 07/01/2017    EGFR 82 11/15/2017     Lab Results   Component Value Date    WBC 3 8 03/30/2018    HGB 13 6 03/30/2018    HCT 39 2 03/30/2018    MCV 98 7 03/30/2018     03/30/2018     Lab Results   Component Value Date    KQW2LERZHEVO 1 719 11/14/2017     Lab Results   Component Value Date    CHOL 172 07/01/2017    CHOL 183 06/17/2016     Lab Results   Component Value Date     (H) 11/15/2017    HDL 94 07/01/2017    HDL 88 06/17/2016     Lab Results   Component Value Date    LDLCALC 63 11/15/2017     Lab Results   Component Value Date    TRIG 42 11/15/2017    TRIG 68 07/01/2017    TRIG 64 06/17/2016

## 2018-09-28 ENCOUNTER — TELEPHONE (OUTPATIENT)
Dept: FAMILY MEDICINE CLINIC | Facility: CLINIC | Age: 58
End: 2018-09-28

## 2018-10-01 NOTE — TELEPHONE ENCOUNTER
----- Message from Gregorio Moctezuma Dr sent at 7/31/2018  7:45 AM EDT -----  Call pt: xray ribs no fracture/ 
60-73 yrs

## 2018-10-04 ENCOUNTER — TELEPHONE (OUTPATIENT)
Dept: FAMILY MEDICINE CLINIC | Facility: CLINIC | Age: 58
End: 2018-10-04

## 2018-10-04 NOTE — TELEPHONE ENCOUNTER
[facility] is asking if she can take rabeprazole sodium dr 20mg in substitute for Aciphex since it is not covered through insurance any more  Call back # [phone]  Reference # -[id number]    If that is okay can you please send a new script to [facility]

## 2018-10-05 ENCOUNTER — TELEPHONE (OUTPATIENT)
Dept: FAMILY MEDICINE CLINIC | Facility: CLINIC | Age: 58
End: 2018-10-05

## 2018-10-05 NOTE — TELEPHONE ENCOUNTER
[facility] called looking for MA for Dr Shanice Montalvo re:   Bernarda Dos Santos      Phone [phone]    REFERENCE NUMBER: [id number]

## 2018-10-05 NOTE — TELEPHONE ENCOUNTER
I called [facility] back and the auth for the aciphex is under clinical review  They will fax us the answer

## 2018-10-08 ENCOUNTER — OFFICE VISIT (OUTPATIENT)
Dept: FAMILY MEDICINE CLINIC | Facility: CLINIC | Age: 58
End: 2018-10-08
Payer: COMMERCIAL

## 2018-10-08 VITALS
SYSTOLIC BLOOD PRESSURE: 132 MMHG | WEIGHT: 146.4 LBS | BODY MASS INDEX: 24.39 KG/M2 | HEART RATE: 80 BPM | HEIGHT: 65 IN | DIASTOLIC BLOOD PRESSURE: 82 MMHG | RESPIRATION RATE: 16 BRPM

## 2018-10-08 DIAGNOSIS — R42 VERTIGO: Primary | ICD-10-CM

## 2018-10-08 DIAGNOSIS — R09.81 SINUS CONGESTION: ICD-10-CM

## 2018-10-08 PROCEDURE — 99213 OFFICE O/P EST LOW 20 MIN: CPT | Performed by: FAMILY MEDICINE

## 2018-10-08 RX ORDER — PREDNISONE 10 MG/1
TABLET ORAL
Qty: 20 TABLET | Refills: 0 | Status: SHIPPED | OUTPATIENT
Start: 2018-10-08 | End: 2018-10-26 | Stop reason: ALTCHOICE

## 2018-10-09 NOTE — PROGRESS NOTES
Assessment/Plan:  1  Vertigo  - discussed rest, plenty of fluids, continue Meclizine 25 mg every 608 hours as needed, follow up in the office if symptoms persist or worsen    2  Sinus congestion  - continue Fluticasone nasal spray and start Prednisone taper, follow up in the office if symptoms not better  - predniSONE 10 mg tablet; Take 2 tablets twice a day with food for 2 days,23 tablets in am and 1 tablet in pm for 2 days, 2 tablets daily for 2 days, 1 tablet daily for 2 days  Dispense: 20 tablet; Refill: 0       Possible side effects of new medications were reviewed with the patient today  The treatment plan was reviewed with the patient  The patient understands and agrees with the treatment plan      Subjective:   Chief Complaint   Patient presents with    Dizziness     times 3 days       Patient ID: Vishnu Mercedes is a 59 y o  female who presents with c/o episodes of dizziness onset on Saturday evening, worse with movement, when bending over, turning her head too fast or rolling over in bed  She had associated nausea on Saturday, no further episodes of nausea since Sunday morning, no associated blurry vision, chest pain, SOB, palpitations, weakness or paresthesias  She has been taking Meclizine twice a day and feleing better today  Patient is also c/o increased nasal congestion, sinus pressure, frontal headache and plugged ear sensation for about a week, no fever or chills, no purulent mucus production, no cough  She has been using Fluticasone and saline nasal rinse  Dizziness   This is a new problem  The current episode started in the past 7 days  The problem occurs intermittently  Associated symptoms include congestion, headaches, nausea and vertigo  Pertinent negatives include no abdominal pain, anorexia, chest pain, coughing, diaphoresis, fatigue, fever, numbness, sore throat, swollen glands, visual change, vomiting or weakness  The symptoms are aggravated by bending and twisting           Past Medical History:   Diagnosis Date    Abnormal findings on diagnostic imaging of breast     Last assessed 13    Adjustment disorder with depressed mood     Last assessed 13    Anxiety     Arthritis     hands    Erythema migrans (Lyme disease)     Last assessed 13    Polyarthritis     Last assessed 16    Uterine leiomyoma      Past Surgical History:   Procedure Laterality Date    BREAST BIOPSY  2002    BREAST SURGERY      CARPAL TUNNEL RELEASE Bilateral 10/2008    DENTAL SURGERY      DILATION AND CURETTAGE OF UTERUS      HYSTEROSCOPY      w/D and C on 06 for irregular menses  Her pathology demonstrated proliferative endometrium    INDUCED       TONSILLECTOMY      WRIST SURGERY Right 2009     Family History   Problem Relation Age of Onset    Arthritis Mother     Hypertension Mother     Coronary artery disease Mother     Skin cancer Mother     Varicose Veins Mother     Uterine cancer Mother     Arthritis Father     Hypertension Father     Stroke Father     Coronary artery disease Father     Other Father         Stent indications    Ulcerative colitis Sister     Rheum arthritis Maternal Grandmother     Substance Abuse Neg Hx     Mental illness Neg Hx      Social History     Social History    Marital status: /Civil Union     Spouse name: N/A    Number of children: N/A    Years of education: N/A     Occupational History    Not on file       Social History Main Topics    Smoking status: Never Smoker    Smokeless tobacco: Never Used    Alcohol use Yes      Comment: social- 2-3 glasses of wine    Drug use: No    Sexual activity: Not on file     Other Topics Concern    Not on file     Social History Narrative    Caffeine Use 1 cup per day    Per allscripts: Denied: History of [place]    Mormonism       Current Outpatient Prescriptions:     ACIPHEX 20 MG tablet, Take 1 tablet (20 mg total) by mouth daily, Disp: 90 tablet, Rfl: 3   Cholecalciferol (VITAMIN D3) 1000 units CHEW, Chew, Disp: , Rfl:     fluticasone (FLONASE) 50 mcg/act nasal spray, 1 spray into each nostril daily as needed for rhinitis, Disp: , Rfl:     hydrochlorothiazide (HYDRODIURIL) 25 mg tablet, Take 1 tablet (25 mg total) by mouth daily, Disp: 90 tablet, Rfl: 3    Ibuprofen (ADVIL) 200 MG CAPS, Take 2 capsules by mouth 2 (two) times a day, Disp: , Rfl:     levalbuterol (XOPENEX HFA) 45 mcg/act inhaler, Inhale 1-2 puffs every 4 (four) hours as needed for wheezing, Disp: , Rfl:     meclizine (ANTIVERT) 25 mg tablet, Take 1 tablet by mouth every 8 (eight) hours as needed for dizziness, Disp: 10 tablet, Rfl: 0    pimecrolimus (ELIDEL) 1 % cream, Apply topically, Disp: , Rfl:     predniSONE 10 mg tablet, Take 2 tablets twice a day with food for 2 days,23 tablets in am and 1 tablet in pm for 2 days, 2 tablets daily for 2 days, 1 tablet daily for 2 days, Disp: 20 tablet, Rfl: 0    Review of Systems   Constitutional: Negative for diaphoresis, fatigue and fever  HENT: Positive for congestion and sinus pressure  Negative for ear discharge, hearing loss, sore throat and trouble swallowing  Respiratory: Negative for cough, shortness of breath and wheezing  Cardiovascular: Negative for chest pain, palpitations and leg swelling  Gastrointestinal: Positive for nausea  Negative for abdominal pain, anorexia, blood in stool, diarrhea and vomiting  Neurological: Positive for dizziness, vertigo and headaches  Negative for syncope, facial asymmetry, speech difficulty, weakness and numbness  Hematological: Negative for adenopathy  Objective:    Vitals:    10/08/18 1458   BP: 132/82   BP Location: Left arm   Patient Position: Sitting   Cuff Size: Standard   Pulse: 80   Resp: 16   Weight: 66 4 kg (146 lb 6 4 oz)   Height: 5' 4 5" (1 638 m)        Physical Exam   Constitutional: She is oriented to person, place, and time   She appears well-developed and well-nourished  No distress  HENT:   Right Ear: Tympanic membrane and ear canal normal    Left Ear: Tympanic membrane and ear canal normal    Nose: Mucosal edema present  No rhinorrhea  Mouth/Throat: Oropharynx is clear and moist    Eyes: Pupils are equal, round, and reactive to light  EOM are normal    Neck: Neck supple  No thyromegaly present  Cardiovascular: Normal rate, regular rhythm and normal heart sounds  No murmur heard  Pulmonary/Chest: Effort normal  No respiratory distress  She has no wheezes  She has no rhonchi  She has no rales  Abdominal: Soft  She exhibits no distension and no mass  There is no tenderness  Musculoskeletal: She exhibits no edema  Lymphadenopathy:     She has no cervical adenopathy  Neurological: She is alert and oriented to person, place, and time  She has normal strength  No cranial nerve deficit  Psychiatric: She has a normal mood and affect   Her behavior is normal  Thought content normal

## 2018-10-17 DIAGNOSIS — R09.81 NASAL CONGESTION: Primary | ICD-10-CM

## 2018-10-17 RX ORDER — FLUTICASONE PROPIONATE 50 MCG
SPRAY, SUSPENSION (ML) NASAL
Qty: 16 G | Refills: 6 | Status: SHIPPED | OUTPATIENT
Start: 2018-10-17 | End: 2019-09-17 | Stop reason: SDUPTHER

## 2018-10-22 ENCOUNTER — HOSPITAL ENCOUNTER (OUTPATIENT)
Dept: RADIOLOGY | Facility: HOSPITAL | Age: 58
Discharge: HOME | End: 2018-10-22
Payer: COMMERCIAL

## 2018-10-22 DIAGNOSIS — Z12.39 SCREENING FOR MALIGNANT NEOPLASM OF BREAST: ICD-10-CM

## 2018-10-22 PROCEDURE — 77067 SCR MAMMO BI INCL CAD: CPT

## 2018-10-26 ENCOUNTER — OFFICE VISIT (OUTPATIENT)
Dept: FAMILY MEDICINE CLINIC | Facility: CLINIC | Age: 58
End: 2018-10-26
Payer: COMMERCIAL

## 2018-10-26 VITALS
DIASTOLIC BLOOD PRESSURE: 80 MMHG | WEIGHT: 146 LBS | HEIGHT: 65 IN | SYSTOLIC BLOOD PRESSURE: 122 MMHG | BODY MASS INDEX: 24.32 KG/M2 | TEMPERATURE: 97.7 F | HEART RATE: 80 BPM | RESPIRATION RATE: 16 BRPM

## 2018-10-26 DIAGNOSIS — M25.532 WRIST PAIN, ACUTE, LEFT: ICD-10-CM

## 2018-10-26 DIAGNOSIS — M06.09 RHEUMATOID ARTHRITIS OF MULTIPLE SITES WITH NEGATIVE RHEUMATOID FACTOR (HCC): ICD-10-CM

## 2018-10-26 DIAGNOSIS — M79.642 PAIN OF LEFT HAND: Primary | ICD-10-CM

## 2018-10-26 PROCEDURE — 99213 OFFICE O/P EST LOW 20 MIN: CPT | Performed by: FAMILY MEDICINE

## 2018-10-26 PROCEDURE — 3008F BODY MASS INDEX DOCD: CPT | Performed by: FAMILY MEDICINE

## 2018-10-26 RX ORDER — PREDNISONE 10 MG/1
TABLET ORAL
Qty: 20 TABLET | Refills: 0 | Status: SHIPPED | OUTPATIENT
Start: 2018-10-26 | End: 2018-12-11 | Stop reason: ALTCHOICE

## 2018-10-29 NOTE — PROGRESS NOTES
Assessment/Plan:  1  Acute pain of left hand/ wrist/ RA  - rest, use ice and wear thumb spica splint, start Prednisone taper and take Aleve as needed, will obtain XR left hand if symptoms not better by Monday, follow up with Rheumatology  - predniSONE 10 mg tablet; Take 4 tablets daily with food for 2 days, 3 tablets daily for 2 days, 2 tablets daily for 2 days, 1 tablet daily for 2 days  Dispense: 20 tablet; Refill: 0  - XR hand 3+ vw left; Future  - follow up in the office if symptoms persist or worsen  Possible side effects of new medications were reviewed with the patient today  The treatment plan was reviewed with the patient  The patient understands and agrees with the treatment plan      Subjective:   Chief Complaint   Patient presents with    Edema     L hand       Patient ID: China Richmond is a 59 y o  female here with c/o left hand/ wrist pain and swelling radial aspect about the base of her thumb for the past few days, worse when moving her thumb, no history of trauma, no fever or chills  Past Medical History:   Diagnosis Date    Abnormal findings on diagnostic imaging of breast     Last assessed 13    Adjustment disorder with depressed mood     Last assessed 13    Anxiety     Arthritis     hands    Erythema migrans (Lyme disease)     Last assessed 13    Polyarthritis     Last assessed 16    Uterine leiomyoma      Past Surgical History:   Procedure Laterality Date    BREAST BIOPSY  2002    BREAST SURGERY      CARPAL TUNNEL RELEASE Bilateral 10/2008    DENTAL SURGERY      DILATION AND CURETTAGE OF UTERUS      HYSTEROSCOPY      w/D and C on 06 for irregular menses    Her pathology demonstrated proliferative endometrium    INDUCED       TONSILLECTOMY      WRIST SURGERY Right 2009     Family History   Problem Relation Age of Onset    Arthritis Mother     Hypertension Mother     Coronary artery disease Mother     Skin cancer Mother  Varicose Veins Mother     Uterine cancer Mother     Arthritis Father     Hypertension Father     Stroke Father     Coronary artery disease Father     Other Father         Stent indications    Ulcerative colitis Sister     Rheum arthritis Maternal Grandmother     Substance Abuse Neg Hx     Mental illness Neg Hx      Social History     Social History    Marital status: /Civil Union     Spouse name: N/A    Number of children: N/A    Years of education: N/A     Occupational History    Not on file       Social History Main Topics    Smoking status: Never Smoker    Smokeless tobacco: Never Used    Alcohol use Yes      Comment: social- 2-3 glasses of wine    Drug use: No    Sexual activity: Not on file     Other Topics Concern    Not on file     Social History Narrative    Caffeine Use 1 cup per day    Per allscripts: Denied: History of [place]    Taoist       Current Outpatient Prescriptions:     ACIPHEX 20 MG tablet, Take 1 tablet (20 mg total) by mouth daily, Disp: 90 tablet, Rfl: 3    Cholecalciferol (VITAMIN D3) 1000 units CHEW, Chew, Disp: , Rfl:     fluticasone (FLONASE) 50 mcg/act nasal spray, instill 2 sprays into each nostril once daily, Disp: 16 g, Rfl: 6    hydrochlorothiazide (HYDRODIURIL) 25 mg tablet, Take 1 tablet (25 mg total) by mouth daily, Disp: 90 tablet, Rfl: 3    Ibuprofen (ADVIL) 200 MG CAPS, Take 2 capsules by mouth 2 (two) times a day, Disp: , Rfl:     levalbuterol (XOPENEX HFA) 45 mcg/act inhaler, Inhale 1-2 puffs every 4 (four) hours as needed for wheezing, Disp: , Rfl:     meclizine (ANTIVERT) 25 mg tablet, Take 1 tablet by mouth every 8 (eight) hours as needed for dizziness, Disp: 10 tablet, Rfl: 0    pimecrolimus (ELIDEL) 1 % cream, Apply topically, Disp: , Rfl:     predniSONE 10 mg tablet, Take 4 tablets daily with food for 2 days, 3 tablets daily for 2 days, 2 tablets daily for 2 days, 1 tablet daily for 2 days, Disp: 20 tablet, Rfl: 0    Review of Systems   Constitutional: Negative for chills, fatigue and fever  Musculoskeletal:        As noted in HPI             Objective:    Vitals:    10/26/18 1447   BP: 122/80   BP Location: Left arm   Patient Position: Sitting   Cuff Size: Standard   Pulse: 80   Resp: 16   Temp: 97 7 °F (36 5 °C)   Weight: 66 2 kg (146 lb)   Height: 5' 4 5" (1 638 m)        Physical Exam   Constitutional: She appears well-developed and well-nourished  No distress  Cardiovascular: Normal rate, regular rhythm and normal heart sounds  No murmur heard    Pulmonary/Chest: Effort normal and breath sounds normal    Musculoskeletal:   Left hand with pain and edema about 1st CMC and 1st MCP joints, + [place]

## 2018-11-08 ENCOUNTER — ANNUAL EXAM (OUTPATIENT)
Dept: OBGYN CLINIC | Facility: CLINIC | Age: 58
End: 2018-11-08
Payer: COMMERCIAL

## 2018-11-08 VITALS
DIASTOLIC BLOOD PRESSURE: 80 MMHG | SYSTOLIC BLOOD PRESSURE: 116 MMHG | BODY MASS INDEX: 24.19 KG/M2 | WEIGHT: 145.2 LBS | HEIGHT: 65 IN

## 2018-11-08 DIAGNOSIS — N95.2 ATROPHY OF VAGINA: Primary | ICD-10-CM

## 2018-11-08 DIAGNOSIS — Z12.31 VISIT FOR SCREENING MAMMOGRAM: ICD-10-CM

## 2018-11-08 DIAGNOSIS — Z12.4 CERVICAL CANCER SCREENING: ICD-10-CM

## 2018-11-08 DIAGNOSIS — D25.9 UTERINE LEIOMYOMA, UNSPECIFIED LOCATION: ICD-10-CM

## 2018-11-08 DIAGNOSIS — Z01.419 WOMEN'S ANNUAL ROUTINE GYNECOLOGICAL EXAMINATION: ICD-10-CM

## 2018-11-08 DIAGNOSIS — Z11.51 SCREENING FOR HPV (HUMAN PAPILLOMAVIRUS): ICD-10-CM

## 2018-11-08 PROCEDURE — 87624 HPV HI-RISK TYP POOLED RSLT: CPT | Performed by: OBSTETRICS & GYNECOLOGY

## 2018-11-08 PROCEDURE — 99396 PREV VISIT EST AGE 40-64: CPT | Performed by: OBSTETRICS & GYNECOLOGY

## 2018-11-08 PROCEDURE — G0145 SCR C/V CYTO,THINLAYER,RESCR: HCPCS | Performed by: OBSTETRICS & GYNECOLOGY

## 2018-11-08 NOTE — PATIENT INSTRUCTIONS
Vaginal Atrophy   WHAT YOU NEED TO KNOW:   What is vaginal atrophy? Vaginal atrophy is a condition that causes thinning, drying, and inflammation of vaginal tissue  This condition is caused by decreased levels of estrogen (a female sex hormone)  Vaginal atrophy can increase your risk for vaginal and urinary tract infections  Vaginal atrophy can worsen over time if not treated  What causes or increases your risk of vaginal atrophy? · Menopause     · Medicines that lower your estrogen levels, such as those used to treat breast cancer, endometriosis, or fibroids    · Radiation to your pelvic area     · Surgery to remove the ovaries    · Breastfeeding  What are the signs and symptoms of vaginal atrophy? · Vaginal dryness, itching, and burning    · Vaginal discharge    · Pain or discomfort during sex    · Light bleeding after sex    · Burning during urination    · Frequent, sudden, strong urges to urinate    · Urinary incontinence (loss of control of your bladder)  How is vaginal atrophy diagnosed? Your healthcare provider will ask about your symptoms  A pelvic exam will be done to examine your vagina and cervix  Your healthcare provider will place a speculum into your vagina to open and examine it  A sample of discharge from your vagina may be collected and tested  A urine test may also be done  How is vaginal atrophy treated? · Over-the counter vaginal moisturizers  can help reduce dryness  Your healthcare provider may recommend that you use a vaginal moisturizer several times each week and during sex  Only use creams that are made for vaginal use  Do  not  use petroleum jelly  Lubricants can be used during sex to decrease pain and discomfort  · Estrogen  may help decrease dryness  It may also lower your risk of vaginal infections if you are going through menopause  It can also help to relieve urinary symptoms  Estrogen may be prescribed in the form of a cream, tablet, or ring   These medicines can be applied or inserted into the vagina  Estrogen can also be prescribed in the form of a pill  When should I contact my healthcare provider? · You have a foul-smelling odor coming from your vagina  · You have a thick, cheese-like discharge from your vagina  · You have itching, swelling, or redness in your vagina  · You have pain or burning when you urinate  · Your urine smells bad  · Your symptoms do not improve, or they get worse  · You have questions or concerns about your condition or care  CARE AGREEMENT:   You have the right to help plan your care  Learn about your health condition and how it may be treated  Discuss treatment options with your caregivers to decide what care you want to receive  You always have the right to refuse treatment  The above information is an  only  It is not intended as medical advice for individual conditions or treatments  Talk to your doctor, nurse or pharmacist before following any medical regimen to see if it is safe and effective for you  © 2017 [employer] Information is for End User's use only and may not be sold, redistributed or otherwise used for commercial purposes  All illustrations and images included in CareNotes® are the copyrighted property of A D A M , [employer]  or Kendall Welch

## 2018-11-08 NOTE — PROGRESS NOTES
Assessment/Plan:  1  Yearly exam-Pap smear done with HPV testing with patient consent, self-breast awareness reviewed, calcium/vitamin-D recommendations discussed, mammogram request given, colonoscopy as per specialist   2  Vaginal atrophy-noted on exam today  Patient is not sexually active as her  has medical issues, most notably MS  She denies any vaginal symptoms  She was briefly counseled about vaginal lubricant/moisturizer and was given the vaginal lubrication sheet  She declines vaginal estrogen Osphena or vaginal DHEA-S  3  History of uterine myoma-16 mm myoma noted ultrasound  which decreased to 7 mm on ultrasound   Patient is without symptoms and is not palpable on exam   No intervention is recommended  4  Prior history of left breast biopsy-patient without any significant issues at this time  Most recent mammogram 10/22/18 was negative  Patient wishes to have standard mammogram next year, and she will reconsider 3-D mammogram going forward  Request for mammogram post dated 10/22/19 was given the patient  11  Other-patient's mother  2 years ago at age 80  She has had difficulty doing with this, but is doing much better  My support was given  She will follow up in 1 year or as needed  No problem-specific Assessment & Plan notes found for this encounter  Diagnoses and all orders for this visit:    Atrophy of vagina    Uterine leiomyoma, unspecified location    Women's annual routine gynecological examination  -     Liquid-based pap, screening  -     Mammo screening bilateral w cad; Future    Visit for screening mammogram  -     Mammo screening bilateral w cad; Future    Cervical cancer screening  -     Liquid-based pap, screening    Screening for HPV (human papillomavirus)  -     Liquid-based pap, screening          Subjective:      Patient ID: Ileana Robert is a 59 y o  female      Patient was seen today for yearly exam   Please see assessment plan for details  The following portions of the patient's history were reviewed and updated as appropriate: allergies, current medications, past family history, past medical history, past social history, past surgical history and problem list     Review of Systems   Constitutional: Negative for chills, diaphoresis, fatigue and fever  Respiratory: Negative for apnea, cough, chest tightness, shortness of breath and wheezing  Cardiovascular: Negative for chest pain, palpitations and leg swelling  Gastrointestinal: Negative for abdominal distention, abdominal pain, anal bleeding, constipation, diarrhea, nausea, rectal pain and vomiting  Genitourinary: Negative for difficulty urinating, dyspareunia, dysuria, frequency, hematuria, menstrual problem, pelvic pain, urgency, vaginal bleeding, vaginal discharge and vaginal pain  Musculoskeletal: Negative for arthralgias, back pain and myalgias  Skin: Negative for color change and rash  Neurological: Negative for dizziness, syncope, light-headedness, numbness and headaches  Hematological: Negative for adenopathy  Does not bruise/bleed easily  Psychiatric/Behavioral: Negative for dysphoric mood and sleep disturbance  The patient is not nervous/anxious  Objective:      /80 (BP Location: Left arm, Patient Position: Sitting, Cuff Size: Adult)   Ht 5' 4 5" (1 638 m)   Wt 65 9 kg (145 lb 3 2 oz)   LMP  (LMP Unknown)   Breastfeeding? No   BMI 24 54 kg/m²          Physical Exam    Objective      /80 (BP Location: Left arm, Patient Position: Sitting, Cuff Size: Adult)   Ht 5' 4 5" (1 638 m)   Wt 65 9 kg (145 lb 3 2 oz)   LMP  (LMP Unknown)   Breastfeeding?  No   BMI 24 54 kg/m²     General:   alert and oriented, in no acute distress, alert, appears stated age and cooperative   Neck: normal to inspection and palpation   Breast: normal appearance, no masses or tenderness   Heart:    Lungs:    Abdomen: soft, non-tender, without masses or organomegaly   Vulva: normal   Vagina: Moderately atrophic, without erythema or lesions or discharge  Vagina admits 2+ finger   Cervix: Mildly atrophic, without lesions or discharge or cervicitis    No Cervical motion tenderness   Uterus: normal size, anteverted, non-tender   Adnexa: no mass, fullness, tenderness   Rectum: negative

## 2018-11-12 LAB
HPV HR 12 DNA CVX QL NAA+PROBE: NEGATIVE
HPV16 DNA CVX QL NAA+PROBE: NEGATIVE
HPV18 DNA CVX QL NAA+PROBE: NEGATIVE

## 2018-11-16 LAB
LAB AP GYN PRIMARY INTERPRETATION: NORMAL
Lab: NORMAL

## 2018-11-19 ENCOUNTER — TELEPHONE (OUTPATIENT)
Dept: OBGYN CLINIC | Facility: CLINIC | Age: 58
End: 2018-11-19

## 2018-12-10 NOTE — PROGRESS NOTES
Assessment and Plan: This is a 78-year-old female presenting today for follow-up for seronegative rheumatoid arthritis, as well as osteoarthritis  The patient states that she has been off of treatment since last appointment  She had previously been on hydroxychloroquine however not consistently therefore it is hard to decipher whether she did have response to this medication  She has stop the medication because of the possibility of side effects related to her eyes  She does note ongoing discomfort in her hands and knees and notices swelling in both of these areas  She does however denies any morning stiffness  She had been on prednisone for treatment of her vertigo and this did significantly improve her joint discomfort  On exam today, there appears to be RA chronic deformities of both hands with ulnar deviation  She does have bony hypertrophy of the 2nd and 3rd MCPs bilaterally, as well as synovitis of the PIPs bilaterally  There is also synovitis of the left ankle with tenderness  At this time, it does appear that patient's rheumatoid arthritis is activeand the patient has agreed to restart hydroxychloroquine with annual eye exams for monitoring  She will return to the office in 3 months time for further evaluation  If there has been no improvement, we may consider further advancement of treatment with methotrexate  She was given information regarding methotrexate and hydroxychloroquine at this time  Both these medications were discussed at length with her  She was also asked to obtain x-rays of her hands to assess for erosive changes  The risks and benefits of treatment for her RA were discussed with the patient at length today        Plan:  Diagnoses and all orders for this visit:    Rheumatoid arthritis of multiple sites with negative rheumatoid factor ([facility] [address] )    Primary generalized (osteo)arthritis    Gastroesophageal reflux disease without esophagitis          Rheumatic Disease Summary:  Established care- February, 2016- long standing hx of polyarthritis using Advil   -Serologies were negative however due to chronic RA deformities, patient was started on HCQ  -Patient not willing to proceed with further options  HPI  Greg Bai is a 59 y o   female who presents today for follow up for seronegative RA  Feeling the same, mostly with pain in the hands and the knees  Some swelling in the knees and knuckles  No AM stiffness  +Difficulty with sleep   +fatigue  Taking Advil at this time with improvement  On Prednisone for vertigo and it helped with joint pain  Next Monday eye exam        The following portions of the patient's history were reviewed and updated as appropriate: allergies, current medications, past family history, past medical history, past social history, past surgical history and problem list     Review of Systems:   Review of Systems   Constitutional: Positive for fatigue  Negative for appetite change, chills, fever and unexpected weight change  HENT: Negative for congestion, mouth sores and sore throat  No recent infxn    Eyes: Negative for pain, redness and visual disturbance  +Dry eyes  No dry mouth    Respiratory: Negative for cough, chest tightness and shortness of breath  Cardiovascular: Negative for chest pain and leg swelling  Gastrointestinal: Negative for abdominal pain, blood in stool, constipation, diarrhea, nausea and vomiting  Endocrine: Negative for polydipsia and polyuria  Genitourinary: Negative for frequency and hematuria  Skin: Negative for color change and rash  No hair loss or nail changes  No raynauds    Neurological: Negative for weakness, light-headedness, numbness and headaches  Hematological: Negative for adenopathy  Psychiatric/Behavioral: Negative for behavioral problems  The patient is not nervous/anxious          Home Medications:     Current Outpatient Prescriptions:     ACIPHEX 20 MG tablet, Take 1 tablet (20 mg total) by mouth daily, Disp: 90 tablet, Rfl: 3    Cholecalciferol (VITAMIN D3) 1000 units CHEW, Chew, Disp: , Rfl:     fluticasone (FLONASE) 50 mcg/act nasal spray, instill 2 sprays into each nostril once daily, Disp: 16 g, Rfl: 6    hydrochlorothiazide (HYDRODIURIL) 25 mg tablet, Take 1 tablet (25 mg total) by mouth daily, Disp: 90 tablet, Rfl: 3    Ibuprofen (ADVIL) 200 MG CAPS, Take 2 capsules by mouth 2 (two) times a day, Disp: , Rfl:     levalbuterol (XOPENEX HFA) 45 mcg/act inhaler, Inhale 1-2 puffs every 4 (four) hours as needed for wheezing, Disp: , Rfl:     meclizine (ANTIVERT) 25 mg tablet, Take 1 tablet by mouth every 8 (eight) hours as needed for dizziness, Disp: 10 tablet, Rfl: 0    pimecrolimus (ELIDEL) 1 % cream, Apply topically, Disp: , Rfl:     predniSONE 10 mg tablet, Take 4 tablets daily with food for 2 days, 3 tablets daily for 2 days, 2 tablets daily for 2 days, 1 tablet daily for 2 days (Patient not taking: Reported on 11/8/2018 ), Disp: 20 tablet, Rfl: 0    Objective:    Vitals:    12/11/18 1529   BP: 130/68   BP Location: Left arm   Patient Position: Sitting   Cuff Size: Standard   Pulse: 82   Weight: 67 6 kg (149 lb)   Height: 5' 4 5" (1 638 m)       Physical Exam   Constitutional: She is oriented to person, place, and time  She appears well-developed and well-nourished  HENT:   Head: Normocephalic  Mouth/Throat: Oropharynx is clear and moist    Eyes: Pupils are equal, round, and reactive to light  Conjunctivae are normal    Neck: Normal range of motion  Neck supple  Cardiovascular: Normal rate, regular rhythm and normal heart sounds  Pulmonary/Chest: Effort normal and breath sounds normal    Musculoskeletal:   +RA deformities of the hands with ulnar deviation and bony hypertrophy primarily of the 2nd and 3rd MCPs B/L  +Crepitus of the knees  Neurological: She is alert and oriented to person, place, and time  Skin: Skin is warm and dry  Psychiatric: She has a normal mood and affect  Her behavior is normal      Musculoskeletal--Peripheral Joint Exam:  Physical Exam     Tenderness:   LLE: tibiotalar    Swelling:   Right hand: 2nd MCP, 3rd MCP, 2nd PIP, 3rd PIP, 4th PIP and 5th PIP  Left hand: 2nd MCP, 3rd MCP, 2nd PIP, 3rd PIP, 4th PIP and 5th PIP  LLE: tibiotalar    ERIC-28 tender joint count: 0  ERIC-28 swollen joint count: 12  CRP (mg/L): 2  Patient global assessment: 20  ERIC-28 CRP score: 2 61 (Low Disease Activity)          Labs:   Component      Latest Ref Rng & Units 12/8/2018   White Blood Cell Count      3 8 - 10 8 Thousand/uL 4 5   Red Blood Cell Count      3 80 - 5 10 Million/uL 3 90   Hemoglobin      11 7 - 15 5 g/dL 13 5   HCT      35 0 - 45 0 % 39 0   MCV      80 0 - 100 0 fL 100 0   MCH      27 0 - 33 0 pg 34 6 (H)   MCHC        32 0 - 36 0 g/dL 34 6   RDW      11 0 - 15 0 % 11 8   Platelet Count      201 - 400 Thousand/uL 285   SL AMB MPV      7 5 - 12 5 fL 9 7   Neutrophils (Absolute)      1,500 - 7,800 cells/uL 2,498   Lymphocytes (Absolute)      850 - 3,900 cells/uL 1,418   Monocytes (Absolute)      200 - 950 cells/uL 455   Eosinophils (Absolute)      15 - 500 cells/uL 72   Basophils ABS      0 - 200 cells/uL 59   Neutrophils      % 55 5   Lymphocytes      % 31 5   Monocytes      % 10 1   Eosinophils      % 1 6   Basophils PCT      % 1 3   Glucose, Random      65 - 99 mg/dL 99   BUN      7 - 25 mg/dL 11   Creatinine      0 50 - 0 99 mg/dL 0 86   eGFR Non       > OR = 60 mL/min/1 73m2 73   SL AMB EGFR       > OR = 60 mL/min/1 73m2 85   SL AMB BUN/CREATININE RATIO      6 - 22 (calc) NOT APPLICABLE   Sodium      135 - 146 mmol/L 139   Potassium      3 5 - 5 3 mmol/L 3 5   Chloride      98 - 110 mmol/L 99   CO2      20 - 32 mmol/L 28   SL AMB CALCIUM      8 6 - 10 4 mg/dL 9 6   SL AMB PROTEIN, TOTAL      6 1 - 8 1 g/dL 6 6   Albumin      3 6 - 5 1 g/dL 4 3   Globulin      1 9 - 3 7 g/dL (calc) 2 3 Albumin/Globulin Ratio      1 0 - 2 5 (calc) 1 9   TOTAL BILIRUBIN      0 2 - 1 2 mg/dL 0 8   Alkaline Phosphatase      33 - 130 U/L 80   SL AMB AST      10 - 35 U/L 20   SL AMB ALT      6 - 29 U/L 13   Sed Rate      < OR = 30 mm/h 2   C-Reactive Protein, Quant      <8 0 mg/L 1 2

## 2018-12-11 ENCOUNTER — OFFICE VISIT (OUTPATIENT)
Dept: RHEUMATOLOGY | Facility: CLINIC | Age: 58
End: 2018-12-11
Payer: COMMERCIAL

## 2018-12-11 VITALS
DIASTOLIC BLOOD PRESSURE: 68 MMHG | WEIGHT: 149 LBS | BODY MASS INDEX: 24.83 KG/M2 | HEART RATE: 82 BPM | SYSTOLIC BLOOD PRESSURE: 130 MMHG | HEIGHT: 65 IN

## 2018-12-11 DIAGNOSIS — K21.9 GASTROESOPHAGEAL REFLUX DISEASE WITHOUT ESOPHAGITIS: ICD-10-CM

## 2018-12-11 DIAGNOSIS — M15.0 PRIMARY GENERALIZED (OSTEO)ARTHRITIS: ICD-10-CM

## 2018-12-11 DIAGNOSIS — M06.09 RHEUMATOID ARTHRITIS OF MULTIPLE SITES WITH NEGATIVE RHEUMATOID FACTOR (HCC): Primary | ICD-10-CM

## 2018-12-11 LAB
ALBUMIN SERPL-MCNC: 4.3 G/DL (ref 3.6–5.1)
ALBUMIN/GLOB SERPL: 1.9 (CALC) (ref 1–2.5)
ALP SERPL-CCNC: 80 U/L (ref 33–130)
ALT SERPL-CCNC: 13 U/L (ref 6–29)
AST SERPL-CCNC: 20 U/L (ref 10–35)
BASOPHILS # BLD AUTO: 59 CELLS/UL (ref 0–200)
BASOPHILS NFR BLD AUTO: 1.3 %
BILIRUB SERPL-MCNC: 0.8 MG/DL (ref 0.2–1.2)
BUN SERPL-MCNC: 11 MG/DL (ref 7–25)
BUN/CREAT SERPL: NORMAL (CALC) (ref 6–22)
CALCIUM SERPL-MCNC: 9.6 MG/DL (ref 8.6–10.4)
CHLORIDE SERPL-SCNC: 99 MMOL/L (ref 98–110)
CHOLEST SERPL-MCNC: 205 MG/DL
CHOLEST/HDLC SERPL: 1.9 (CALC)
CO2 SERPL-SCNC: 28 MMOL/L (ref 20–32)
CREAT SERPL-MCNC: 0.86 MG/DL (ref 0.5–0.99)
CRP SERPL-MCNC: 1.2 MG/L
EOSINOPHIL # BLD AUTO: 72 CELLS/UL (ref 15–500)
EOSINOPHIL NFR BLD AUTO: 1.6 %
ERYTHROCYTE [DISTWIDTH] IN BLOOD BY AUTOMATED COUNT: 11.8 % (ref 11–15)
ERYTHROCYTE [SEDIMENTATION RATE] IN BLOOD BY WESTERGREN METHOD: 2 MM/H
GLOBULIN SER CALC-MCNC: 2.3 G/DL (CALC) (ref 1.9–3.7)
GLUCOSE SERPL-MCNC: 99 MG/DL (ref 65–99)
HCT VFR BLD AUTO: 39 % (ref 35–45)
HDLC SERPL-MCNC: 108 MG/DL
HGB BLD-MCNC: 13.5 G/DL (ref 11.7–15.5)
LDLC SERPL CALC-MCNC: 81 MG/DL (CALC)
LYMPHOCYTES # BLD AUTO: 1418 CELLS/UL (ref 850–3900)
LYMPHOCYTES NFR BLD AUTO: 31.5 %
MCH RBC QN AUTO: 34.6 PG (ref 27–33)
MCHC RBC AUTO-ENTMCNC: 34.6 G/DL (ref 32–36)
MCV RBC AUTO: 100 FL (ref 80–100)
MONOCYTES # BLD AUTO: 455 CELLS/UL (ref 200–950)
MONOCYTES NFR BLD AUTO: 10.1 %
NEUTROPHILS # BLD AUTO: 2498 CELLS/UL (ref 1500–7800)
NEUTROPHILS NFR BLD AUTO: 55.5 %
NONHDLC SERPL-MCNC: 97 MG/DL (CALC)
PLATELET # BLD AUTO: 285 THOUSAND/UL (ref 140–400)
PMV BLD REES-ECKER: 9.7 FL (ref 7.5–12.5)
POTASSIUM SERPL-SCNC: 3.5 MMOL/L (ref 3.5–5.3)
PROT SERPL-MCNC: 6.6 G/DL (ref 6.1–8.1)
RBC # BLD AUTO: 3.9 MILLION/UL (ref 3.8–5.1)
SL AMB EGFR AFRICAN AMERICAN: 85 ML/MIN/1.73M2
SL AMB EGFR NON AFRICAN AMERICAN: 73 ML/MIN/1.73M2
SODIUM SERPL-SCNC: 139 MMOL/L (ref 135–146)
TRIGL SERPL-MCNC: 78 MG/DL
WBC # BLD AUTO: 4.5 THOUSAND/UL (ref 3.8–10.8)

## 2018-12-11 PROCEDURE — 99214 OFFICE O/P EST MOD 30 MIN: CPT | Performed by: PHYSICIAN ASSISTANT

## 2018-12-11 RX ORDER — HYDROXYCHLOROQUINE SULFATE 200 MG/1
TABLET, FILM COATED ORAL
Qty: 135 TABLET | Refills: 3 | Status: SHIPPED | OUTPATIENT
Start: 2018-12-11 | End: 2019-09-17 | Stop reason: ALTCHOICE

## 2018-12-12 ENCOUNTER — TELEPHONE (OUTPATIENT)
Dept: RHEUMATOLOGY | Facility: CLINIC | Age: 58
End: 2018-12-12

## 2018-12-12 ENCOUNTER — TELEPHONE (OUTPATIENT)
Dept: FAMILY MEDICINE CLINIC | Facility: CLINIC | Age: 58
End: 2018-12-12

## 2018-12-12 NOTE — TELEPHONE ENCOUNTER
Marva Menchaca (pharmacist) called requesting clarification on medication Hydroxychloroquine 200 mg for patient  He is asking whether he should spit the tablets for extended release purposes   Please advisedebra (pharmacist)  [phone]    Ref# [id number]

## 2018-12-12 NOTE — TELEPHONE ENCOUNTER
----- Message from Glenn Travis MD sent at 12/11/2018  5:00 PM EST -----  Please call Pt with normal results

## 2018-12-13 NOTE — TELEPHONE ENCOUNTER
Not sure what this pharmacist is asking  He should just fill full tablets and patient will break them  This is not an immediate vs extended release medication

## 2019-01-31 ENCOUNTER — OFFICE VISIT (OUTPATIENT)
Dept: FAMILY MEDICINE CLINIC | Facility: CLINIC | Age: 59
End: 2019-01-31
Payer: COMMERCIAL

## 2019-01-31 VITALS
HEART RATE: 80 BPM | HEIGHT: 64 IN | BODY MASS INDEX: 25.44 KG/M2 | TEMPERATURE: 97.8 F | WEIGHT: 149 LBS | DIASTOLIC BLOOD PRESSURE: 100 MMHG | SYSTOLIC BLOOD PRESSURE: 148 MMHG | RESPIRATION RATE: 16 BRPM

## 2019-01-31 DIAGNOSIS — J01.10 ACUTE NON-RECURRENT FRONTAL SINUSITIS: Primary | ICD-10-CM

## 2019-01-31 PROCEDURE — 1036F TOBACCO NON-USER: CPT | Performed by: NURSE PRACTITIONER

## 2019-01-31 PROCEDURE — 99213 OFFICE O/P EST LOW 20 MIN: CPT | Performed by: NURSE PRACTITIONER

## 2019-01-31 PROCEDURE — 3008F BODY MASS INDEX DOCD: CPT | Performed by: NURSE PRACTITIONER

## 2019-01-31 RX ORDER — AZITHROMYCIN 250 MG/1
500 TABLET, FILM COATED ORAL EVERY 24 HOURS
Qty: 10 TABLET | Refills: 0 | Status: SHIPPED | OUTPATIENT
Start: 2019-01-31 | End: 2019-02-05

## 2019-01-31 RX ORDER — PREDNISONE 10 MG/1
TABLET ORAL
Qty: 20 TABLET | Refills: 0 | Status: SHIPPED | OUTPATIENT
Start: 2019-01-31 | End: 2019-03-26 | Stop reason: ALTCHOICE

## 2019-01-31 NOTE — PROGRESS NOTES
Assessment/Plan:    1  Acute non-recurrent frontal sinusitis  Please take the prednisone and the zpak as directed  Call if you are not feeling better  - azithromycin (ZITHROMAX) 250 mg tablet; Take 2 tablets (500 mg total) by mouth every 24 hours for 5 days  Dispense: 10 tablet; Refill: 0  - predniSONE 10 mg tablet; Take 4,4,3,3,2,2,1,1  Dispense: 20 tablet; Refill: 0    rto prn     Subjective:      Patient ID: Alessia Larsen is a 62 y o  female  Here today with cough and PND that started one month ago  Has some chest tightness and wheezing  No fevers but headache and head pressure  R ear ache  Is taking mucinex that helped her chest somewhat then started advil sinus  OBJECTIVE  Past Medical History:   Diagnosis Date    Abnormal findings on diagnostic imaging of breast     Last assessed 08/16/13    Adjustment disorder with depressed mood     Last assessed 05/17/13    Anxiety     Arthritis     hands    Erythema migrans (Lyme disease)     Last assessed 06/29/13    Polyarthritis     Last assessed 02/17/16    Uterine leiomyoma      temporarily  Wt Readings from Last 3 Encounters:   01/31/19 67 6 kg (149 lb)   12/11/18 67 6 kg (149 lb)   11/08/18 65 9 kg (145 lb 3 2 oz)     BP Readings from Last 3 Encounters:   01/31/19 148/100   12/11/18 130/68   11/08/18 116/80     Pulse Readings from Last 3 Encounters:   01/31/19 80   12/11/18 82   10/26/18 80     BMI Readings from Last 3 Encounters:   01/31/19 25 98 kg/m²   12/11/18 25 18 kg/m²   11/08/18 24 54 kg/m²        Physical Exam   Constitutional: She appears well-developed and well-nourished  HENT:   Tm;s dull b/l  turbs inflamed  Purulent PND     Neck: Normal range of motion  Cardiovascular: Normal rate, regular rhythm and normal heart sounds  Pulmonary/Chest: Effort normal and breath sounds normal  No respiratory distress  She has no wheezes  Skin: Skin is warm and dry  Psychiatric: She has a normal mood and affect   Her behavior is normal  Judgment and thought content normal

## 2019-02-04 ENCOUNTER — TELEPHONE (OUTPATIENT)
Dept: FAMILY MEDICINE CLINIC | Facility: CLINIC | Age: 59
End: 2019-02-04

## 2019-02-04 NOTE — TELEPHONE ENCOUNTER
Patient only took 1 pill of the z mario  She is sure it is the prednisone that is making her red and blotchy  She has quite a bit of prednisone left to take  Should she stop that?  She is not taking the zpak pills

## 2019-02-04 NOTE — TELEPHONE ENCOUNTER
Patient states she was given a different prednisone from the pharmacy that she is used to  She states her face is now swollen blotchy and red  She has never reacted this way before  Asking for advice

## 2019-02-04 NOTE — TELEPHONE ENCOUNTER
I would be more concerned that she is reacting to the antibiotic and not the prednisone  She should be taking the last pill of the zpak to day   If she has not taken it yet, she should not take it  She should continue the prednisone and she should also take benedryl  Call and let me know how she is doing   Avoid heat and hot showers

## 2019-03-26 ENCOUNTER — OFFICE VISIT (OUTPATIENT)
Dept: FAMILY MEDICINE CLINIC | Facility: CLINIC | Age: 59
End: 2019-03-26
Payer: COMMERCIAL

## 2019-03-26 VITALS
BODY MASS INDEX: 25.08 KG/M2 | HEIGHT: 64 IN | DIASTOLIC BLOOD PRESSURE: 80 MMHG | RESPIRATION RATE: 16 BRPM | WEIGHT: 146.9 LBS | HEART RATE: 76 BPM | SYSTOLIC BLOOD PRESSURE: 132 MMHG

## 2019-03-26 DIAGNOSIS — K21.9 GASTROESOPHAGEAL REFLUX DISEASE, ESOPHAGITIS PRESENCE NOT SPECIFIED: ICD-10-CM

## 2019-03-26 DIAGNOSIS — R09.81 SINUS CONGESTION: ICD-10-CM

## 2019-03-26 DIAGNOSIS — C44.311 BASAL CELL CARCINOMA OF SKIN OF NOSE: ICD-10-CM

## 2019-03-26 DIAGNOSIS — M06.09 RHEUMATOID ARTHRITIS OF MULTIPLE SITES WITH NEGATIVE RHEUMATOID FACTOR (HCC): ICD-10-CM

## 2019-03-26 DIAGNOSIS — I10 ESSENTIAL HYPERTENSION: Primary | ICD-10-CM

## 2019-03-26 DIAGNOSIS — J45.20 MILD INTERMITTENT ASTHMA WITHOUT COMPLICATION: ICD-10-CM

## 2019-03-26 PROCEDURE — 93000 ELECTROCARDIOGRAM COMPLETE: CPT | Performed by: FAMILY MEDICINE

## 2019-03-26 PROCEDURE — 3008F BODY MASS INDEX DOCD: CPT | Performed by: FAMILY MEDICINE

## 2019-03-26 PROCEDURE — 3079F DIAST BP 80-89 MM HG: CPT | Performed by: FAMILY MEDICINE

## 2019-03-26 PROCEDURE — 99214 OFFICE O/P EST MOD 30 MIN: CPT | Performed by: FAMILY MEDICINE

## 2019-03-26 PROCEDURE — 3075F SYST BP GE 130 - 139MM HG: CPT | Performed by: FAMILY MEDICINE

## 2019-03-26 RX ORDER — DIPHENOXYLATE HYDROCHLORIDE AND ATROPINE SULFATE 2.5; .025 MG/1; MG/1
1 TABLET ORAL DAILY
COMMUNITY

## 2019-03-27 NOTE — PROGRESS NOTES
Assessment/Plan:  1  Essential hypertension  - POCT ECG: NSR  - well controlled, continue HCTZ 25 mg daily    2  Sinus congestion  - continue Flonase and saline nasal rinse, ENT referral  - Ambulatory Referral to Otolaryngology; Future    3  Gastroesophageal reflux disease  - continue Aciphex 20 mg daily, doing well    4  Mild intermittent asthma without complication  - continue Xopenex inhaler as needed, uses rarely    5  Rheumatoid arthritis of multiple sites with negative rheumatoid factor ([facility] [address] )  - follow up with Rheumatoloy    6  Basal cell carcinoma of skin of nose  - follow up with Dermatology and Plastic surgery    Follow up in 6 months or sooner as needed  The treatment plan was reviewed with the patient  The patient understands and agrees with the treatment plan      Subjective:   Chief Complaint   Patient presents with    Hypertension    GERD    Asthma      Patient ID: Perlita Pappas is a 62 y o  female who presents for follow up for hypertension, GERD, chronic rhinitis  She is c/o recurrent nasal congestion, sinus pressure and postnasal drip, she has been using Flonase and saline nasal rinse daily with mild relief of her symptoms  No fever or chills, no purulent mucus production, no cough, no sore throat, no ear pain  Patient was recently found to have basal cell cancer of right side nose and scheduled for Mohs surgery with reconstruction on 04/25/19  Patient has no other complaints      The following portions of the patient's history were reviewed and updated as appropriate: allergies, current medications, past family history, past medical history, past social history, past surgical history and problem list     Past Medical History:   Diagnosis Date    Abnormal findings on diagnostic imaging of breast     Last assessed 08/16/13    Adjustment disorder with depressed mood     Last assessed 05/17/13    Anxiety     Arthritis     hands    Erythema migrans (Lyme disease)     Last assessed 13    Polyarthritis     Last assessed 16    Uterine leiomyoma      Past Surgical History:   Procedure Laterality Date    BREAST BIOPSY  2002    BREAST SURGERY      CARPAL TUNNEL RELEASE Bilateral 10/2008    DENTAL SURGERY      DILATION AND CURETTAGE OF UTERUS      HYSTEROSCOPY      w/D and C on 06 for irregular menses    Her pathology demonstrated proliferative endometrium    INDUCED       TONSILLECTOMY      WRIST SURGERY Right 2009     Family History   Problem Relation Age of Onset    Arthritis Mother     Hypertension Mother     Coronary artery disease Mother     Skin cancer Mother     Varicose Veins Mother     Uterine cancer Mother     Arthritis Father     Hypertension Father     Stroke Father     Coronary artery disease Father     Other Father         Stent indications    Ulcerative colitis Sister     Rheum arthritis Maternal Grandmother     Substance Abuse Neg Hx     Mental illness Neg Hx      Social History     Socioeconomic History    Marital status: /Civil Union     Spouse name: Not on file    Number of children: Not on file    Years of education: Not on file    Highest education level: Not on file   Occupational History    Not on file   Social Needs    Financial resource strain: Not on file    Food insecurity:     Worry: Not on file     Inability: Not on file    Transportation needs:     Medical: Not on file     Non-medical: Not on file   Tobacco Use    Smoking status: Never Smoker    Smokeless tobacco: Never Used   Substance and Sexual Activity    Alcohol use: Yes     Comment: social- 2-3 glasses of wine    Drug use: No    Sexual activity: Not on file   Lifestyle    Physical activity:     Days per week: Not on file     Minutes per session: Not on file    Stress: Not on file   Relationships    Social connections:     Talks on phone: Not on file     Gets together: Not on file     Attends Pentecostalism service: Not on file     Active member of club or organization: Not on file     Attends meetings of clubs or organizations: Not on file     Relationship status: Not on file    Intimate partner violence:     Fear of current or ex partner: Not on file     Emotionally abused: Not on file     Physically abused: Not on file     Forced sexual activity: Not on file   Other Topics Concern    Not on file   Social History Narrative    Caffeine Use 1 cup per day    Per allscripts: Denied: History of [place]    Baptism       Current Outpatient Medications:     ACIPHEX 20 MG tablet, Take 1 tablet (20 mg total) by mouth daily, Disp: 90 tablet, Rfl: 3    Cholecalciferol (VITAMIN D3) 1000 units CHEW, Chew, Disp: , Rfl:     fluticasone (FLONASE) 50 mcg/act nasal spray, instill 2 sprays into each nostril once daily, Disp: 16 g, Rfl: 6    hydrochlorothiazide (HYDRODIURIL) 25 mg tablet, Take 1 tablet (25 mg total) by mouth daily, Disp: 90 tablet, Rfl: 3    Ibuprofen (ADVIL) 200 MG CAPS, Take 2 capsules by mouth 2 (two) times a day, Disp: , Rfl:     levalbuterol (XOPENEX HFA) 45 mcg/act inhaler, Inhale 1-2 puffs every 4 (four) hours as needed for wheezing, Disp: , Rfl:     meclizine (ANTIVERT) 25 mg tablet, Take 1 tablet by mouth every 8 (eight) hours as needed for dizziness, Disp: 10 tablet, Rfl: 0    multivitamin (THERAGRAN) TABS, Take 1 tablet by mouth daily, Disp: , Rfl:     pimecrolimus (ELIDEL) 1 % cream, Apply topically, Disp: , Rfl:     hydroxychloroquine (PLAQUENIL) 200 mg tablet, 1 and 1/2 tablet po daily (Patient not taking: Reported on 3/26/2019), Disp: 135 tablet, Rfl: 3    Review of Systems   Constitutional: Negative for diaphoresis, fatigue and fever  HENT: Positive for congestion, postnasal drip and sinus pressure  Negative for ear discharge, sore throat and trouble swallowing  Respiratory: Negative for cough, shortness of breath and wheezing  Cardiovascular: Negative for chest pain, palpitations and leg swelling  Gastrointestinal: Negative for abdominal pain, blood in stool, diarrhea, nausea and vomiting  Genitourinary: Negative for dysuria, frequency, hematuria, pelvic pain and urgency  Neurological: Positive for headaches  Negative for dizziness, syncope, facial asymmetry, speech difficulty, weakness and numbness  Hematological: Negative for adenopathy  Psychiatric/Behavioral: Negative for dysphoric mood and sleep disturbance  The patient is not nervous/anxious  Objective:    Vitals:    03/26/19 1711   BP: 132/80   BP Location: Left arm   Patient Position: Sitting   Cuff Size: Standard   Pulse: 76   Resp: 16   Weight: 66 6 kg (146 lb 14 4 oz)   Height: 5' 3 5" (1 613 m)        Physical Exam   Constitutional: She is oriented to person, place, and time  She appears well-developed and well-nourished  No distress  HENT:   Right Ear: Tympanic membrane and ear canal normal    Left Ear: Tympanic membrane and ear canal normal    Nose: Mucosal edema present  No rhinorrhea  Right sinus exhibits no maxillary sinus tenderness and no frontal sinus tenderness  Left sinus exhibits no maxillary sinus tenderness and no frontal sinus tenderness  Mouth/Throat: Oropharynx is clear and moist    Eyes: Pupils are equal, round, and reactive to light  EOM are normal    Neck: Neck supple  No thyromegaly present  Cardiovascular: Normal rate, regular rhythm and normal heart sounds  No murmur heard  Pulmonary/Chest: Effort normal  No respiratory distress  She has no wheezes  She has no rhonchi  She has no rales  Abdominal: Soft  She exhibits no distension and no mass  There is no tenderness  Musculoskeletal: She exhibits no edema  Lymphadenopathy:     She has no cervical adenopathy  Neurological: She is alert and oriented to person, place, and time  She has normal strength  No cranial nerve deficit  Psychiatric: She has a normal mood and affect   Her behavior is normal  Thought content normal          Lab Results Component Value Date     07/01/2017    SODIUM 139 12/08/2018    K 3 5 12/08/2018    CL 99 12/08/2018    CO2 28 12/08/2018    AGAP 6 11/15/2017    BUN 11 12/08/2018    CREATININE 0 79 11/15/2017    GLUC 99 12/08/2018    GLUF 98 11/15/2017    CALCIUM 9 6 12/08/2018    AST 20 12/08/2018    ALT 13 12/08/2018    ALKPHOS 80 12/08/2018    PROT 6 6 07/01/2017    TP 6 6 12/08/2018    BILITOT 0 6 07/01/2017    TBILI 0 8 12/08/2018    EGFR 82 11/15/2017     Lab Results   Component Value Date    WBC 4 5 12/08/2018    HGB 13 5 12/08/2018    HCT 39 0 12/08/2018     0 12/08/2018     12/08/2018     Lab Results   Component Value Date    XZL6VNLAXMOY 1 719 11/14/2017     Lab Results   Component Value Date    CHOL 172 07/01/2017    CHOL 183 06/17/2016     Lab Results   Component Value Date     12/08/2018     (H) 11/15/2017    HDL 94 07/01/2017     Lab Results   Component Value Date    LDLCALC 63 11/15/2017     Lab Results   Component Value Date    TRIG 78 12/08/2018    TRIG 42 11/15/2017    TRIG 68 07/01/2017

## 2019-04-01 ENCOUNTER — CONSULT (OUTPATIENT)
Dept: PLASTIC SURGERY | Facility: CLINIC | Age: 59
End: 2019-04-01
Payer: COMMERCIAL

## 2019-04-01 VITALS — WEIGHT: 149.9 LBS | HEIGHT: 63 IN | BODY MASS INDEX: 26.56 KG/M2

## 2019-04-01 DIAGNOSIS — C44.311 BASAL CELL CARCINOMA OF RIGHT SIDE OF NOSE: Primary | ICD-10-CM

## 2019-04-01 PROCEDURE — 99204 OFFICE O/P NEW MOD 45 MIN: CPT | Performed by: PHYSICIAN ASSISTANT

## 2019-04-02 PROBLEM — C44.311 BASAL CELL CARCINOMA OF SKIN OF NOSE: Status: ACTIVE | Noted: 2019-04-02

## 2019-04-14 LAB
BASOPHILS # BLD AUTO: 51 CELLS/UL (ref 0–200)
BASOPHILS NFR BLD AUTO: 1.6 %
BUN SERPL-MCNC: 10 MG/DL (ref 7–25)
BUN/CREAT SERPL: ABNORMAL (CALC) (ref 6–22)
CALCIUM SERPL-MCNC: 9.6 MG/DL (ref 8.6–10.4)
CHLORIDE SERPL-SCNC: 97 MMOL/L (ref 98–110)
CO2 SERPL-SCNC: 30 MMOL/L (ref 20–32)
CREAT SERPL-MCNC: 0.81 MG/DL (ref 0.5–0.99)
EOSINOPHIL # BLD AUTO: 80 CELLS/UL (ref 15–500)
EOSINOPHIL NFR BLD AUTO: 2.5 %
ERYTHROCYTE [DISTWIDTH] IN BLOOD BY AUTOMATED COUNT: 11.7 % (ref 11–15)
GLUCOSE SERPL-MCNC: 99 MG/DL (ref 65–99)
HCT VFR BLD AUTO: 41.1 % (ref 35–45)
HGB BLD-MCNC: 14.2 G/DL (ref 11.7–15.5)
LYMPHOCYTES # BLD AUTO: 915 CELLS/UL (ref 850–3900)
LYMPHOCYTES NFR BLD AUTO: 28.6 %
MCH RBC QN AUTO: 34 PG (ref 27–33)
MCHC RBC AUTO-ENTMCNC: 34.5 G/DL (ref 32–36)
MCV RBC AUTO: 98.3 FL (ref 80–100)
MONOCYTES # BLD AUTO: 336 CELLS/UL (ref 200–950)
MONOCYTES NFR BLD AUTO: 10.5 %
NEUTROPHILS # BLD AUTO: 1818 CELLS/UL (ref 1500–7800)
NEUTROPHILS NFR BLD AUTO: 56.8 %
PLATELET # BLD AUTO: 252 THOUSAND/UL (ref 140–400)
PMV BLD REES-ECKER: 9.7 FL (ref 7.5–12.5)
POTASSIUM SERPL-SCNC: 3.9 MMOL/L (ref 3.5–5.3)
RBC # BLD AUTO: 4.18 MILLION/UL (ref 3.8–5.1)
SL AMB EGFR AFRICAN AMERICAN: 91 ML/MIN/1.73M2
SL AMB EGFR NON AFRICAN AMERICAN: 78 ML/MIN/1.73M2
SODIUM SERPL-SCNC: 135 MMOL/L (ref 135–146)
WBC # BLD AUTO: 3.2 THOUSAND/UL (ref 3.8–10.8)

## 2019-04-25 ENCOUNTER — HOSPITAL ENCOUNTER (OUTPATIENT)
Facility: HOSPITAL | Age: 59
Setting detail: OUTPATIENT SURGERY
Discharge: HOME | End: 2019-04-25
Attending: SURGERY | Admitting: SURGERY
Payer: COMMERCIAL

## 2019-04-25 ENCOUNTER — ANESTHESIA EVENT (OUTPATIENT)
Dept: PERIOP | Facility: HOSPITAL | Age: 59
End: 2019-04-25
Payer: COMMERCIAL

## 2019-04-25 ENCOUNTER — ANESTHESIA (OUTPATIENT)
Dept: PERIOP | Facility: HOSPITAL | Age: 59
End: 2019-04-25
Payer: COMMERCIAL

## 2019-04-25 VITALS
RESPIRATION RATE: 16 BRPM | TEMPERATURE: 97.6 F | HEART RATE: 62 BPM | WEIGHT: 145 LBS | HEIGHT: 64 IN | DIASTOLIC BLOOD PRESSURE: 68 MMHG | OXYGEN SATURATION: 98 % | BODY MASS INDEX: 24.75 KG/M2 | SYSTOLIC BLOOD PRESSURE: 140 MMHG

## 2019-04-25 PROCEDURE — 15004 WOUND PREP F/N/HF/G: CPT | Performed by: SURGERY

## 2019-04-25 PROCEDURE — 15240 FTH/GFT F/C/C/M/N/AX/G/H/F20: CPT | Performed by: SURGERY

## 2019-04-25 RX ORDER — CEFAZOLIN SODIUM 1 G/50ML
SOLUTION INTRAVENOUS AS NEEDED
Status: DISCONTINUED | OUTPATIENT
Start: 2019-04-25 | End: 2019-04-25 | Stop reason: SURG

## 2019-04-25 RX ORDER — FENTANYL CITRATE 50 UG/ML
INJECTION, SOLUTION INTRAMUSCULAR; INTRAVENOUS AS NEEDED
Status: DISCONTINUED | OUTPATIENT
Start: 2019-04-25 | End: 2019-04-25 | Stop reason: SURG

## 2019-04-25 RX ORDER — CEFAZOLIN SODIUM 1 G/50ML
1000 SOLUTION INTRAVENOUS ONCE
Status: DISCONTINUED | OUTPATIENT
Start: 2019-04-25 | End: 2019-04-25 | Stop reason: HOSPADM

## 2019-04-25 RX ORDER — SODIUM CHLORIDE, SODIUM LACTATE, POTASSIUM CHLORIDE, CALCIUM CHLORIDE 600; 310; 30; 20 MG/100ML; MG/100ML; MG/100ML; MG/100ML
125 INJECTION, SOLUTION INTRAVENOUS CONTINUOUS
Status: DISCONTINUED | OUTPATIENT
Start: 2019-04-25 | End: 2019-04-25 | Stop reason: HOSPADM

## 2019-04-25 RX ORDER — MIDAZOLAM HYDROCHLORIDE 1 MG/ML
INJECTION INTRAMUSCULAR; INTRAVENOUS AS NEEDED
Status: DISCONTINUED | OUTPATIENT
Start: 2019-04-25 | End: 2019-04-25 | Stop reason: SURG

## 2019-04-25 RX ORDER — HYDROMORPHONE HCL/PF 1 MG/ML
0.5 SYRINGE (ML) INJECTION
Status: DISCONTINUED | OUTPATIENT
Start: 2019-04-25 | End: 2019-04-25 | Stop reason: HOSPADM

## 2019-04-25 RX ORDER — ACETAMINOPHEN 325 MG/1
650 TABLET ORAL EVERY 6 HOURS
Status: DISCONTINUED | OUTPATIENT
Start: 2019-04-25 | End: 2019-04-25 | Stop reason: HOSPADM

## 2019-04-25 RX ORDER — GINSENG 100 MG
CAPSULE ORAL AS NEEDED
Status: DISCONTINUED | OUTPATIENT
Start: 2019-04-25 | End: 2019-04-25 | Stop reason: HOSPADM

## 2019-04-25 RX ORDER — ONDANSETRON 2 MG/ML
INJECTION INTRAMUSCULAR; INTRAVENOUS AS NEEDED
Status: DISCONTINUED | OUTPATIENT
Start: 2019-04-25 | End: 2019-04-25 | Stop reason: SURG

## 2019-04-25 RX ORDER — FENTANYL CITRATE/PF 50 MCG/ML
25 SYRINGE (ML) INJECTION
Status: DISCONTINUED | OUTPATIENT
Start: 2019-04-25 | End: 2019-04-25 | Stop reason: HOSPADM

## 2019-04-25 RX ORDER — PROPOFOL 10 MG/ML
INJECTION, EMULSION INTRAVENOUS AS NEEDED
Status: DISCONTINUED | OUTPATIENT
Start: 2019-04-25 | End: 2019-04-25 | Stop reason: SURG

## 2019-04-25 RX ADMIN — SODIUM CHLORIDE, SODIUM LACTATE, POTASSIUM CHLORIDE, AND CALCIUM CHLORIDE 125 ML/HR: .6; .31; .03; .02 INJECTION, SOLUTION INTRAVENOUS at 14:26

## 2019-04-25 RX ADMIN — ACETAMINOPHEN 650 MG: 325 TABLET, FILM COATED ORAL at 17:22

## 2019-04-25 RX ADMIN — MIDAZOLAM 2 MG: 1 INJECTION INTRAMUSCULAR; INTRAVENOUS at 15:41

## 2019-04-25 RX ADMIN — ONDANSETRON 4 MG: 2 INJECTION INTRAMUSCULAR; INTRAVENOUS at 16:21

## 2019-04-25 RX ADMIN — FENTANYL CITRATE 50 MCG: 50 INJECTION, SOLUTION INTRAMUSCULAR; INTRAVENOUS at 15:56

## 2019-04-25 RX ADMIN — FENTANYL CITRATE 50 MCG: 50 INJECTION, SOLUTION INTRAMUSCULAR; INTRAVENOUS at 15:41

## 2019-04-25 RX ADMIN — CEFAZOLIN SODIUM 1000 MG: 1 SOLUTION INTRAVENOUS at 15:41

## 2019-04-25 RX ADMIN — FENTANYL CITRATE 25 MCG: 50 INJECTION, SOLUTION INTRAMUSCULAR; INTRAVENOUS at 17:14

## 2019-04-25 RX ADMIN — PROPOFOL 140 MG: 10 INJECTION, EMULSION INTRAVENOUS at 15:45

## 2019-04-29 ENCOUNTER — OFFICE VISIT (OUTPATIENT)
Dept: PLASTIC SURGERY | Facility: CLINIC | Age: 59
End: 2019-04-29

## 2019-04-29 VITALS — BODY MASS INDEX: 24.75 KG/M2 | WEIGHT: 145 LBS | HEIGHT: 64 IN

## 2019-04-29 DIAGNOSIS — C44.311 BASAL CELL CARCINOMA OF SKIN OF NOSE: Primary | ICD-10-CM

## 2019-04-29 PROCEDURE — 99024 POSTOP FOLLOW-UP VISIT: CPT | Performed by: PHYSICIAN ASSISTANT

## 2019-05-06 ENCOUNTER — OFFICE VISIT (OUTPATIENT)
Dept: PLASTIC SURGERY | Facility: CLINIC | Age: 59
End: 2019-05-06

## 2019-05-06 DIAGNOSIS — C44.311 BASAL CELL CARCINOMA OF SKIN OF NOSE: Primary | ICD-10-CM

## 2019-05-06 PROCEDURE — 99024 POSTOP FOLLOW-UP VISIT: CPT | Performed by: PHYSICIAN ASSISTANT

## 2019-07-28 DIAGNOSIS — I10 ESSENTIAL HYPERTENSION: ICD-10-CM

## 2019-07-28 RX ORDER — HYDROCHLOROTHIAZIDE 25 MG/1
TABLET ORAL
Qty: 90 TABLET | Refills: 4 | Status: SHIPPED | OUTPATIENT
Start: 2019-07-28 | End: 2021-05-04 | Stop reason: SDUPTHER

## 2019-07-29 ENCOUNTER — OFFICE VISIT (OUTPATIENT)
Dept: PLASTIC SURGERY | Facility: CLINIC | Age: 59
End: 2019-07-29

## 2019-07-29 VITALS — HEIGHT: 64 IN | WEIGHT: 144.6 LBS | BODY MASS INDEX: 24.69 KG/M2

## 2019-07-29 DIAGNOSIS — Z85.828 HISTORY OF BASAL CELL CARCINOMA: Primary | ICD-10-CM

## 2019-07-29 PROCEDURE — 99024 POSTOP FOLLOW-UP VISIT: CPT | Performed by: PHYSICIAN ASSISTANT

## 2019-07-29 NOTE — LETTER
July 29, 2019     Meche Jones MD  [address] [facility] [address] [id number]    Patient: Swetha Alba   YOB: 1960   Date of Visit: 7/29/2019       Dear Dr Louie Benedict:    Thank you for referring Ilia Siddiqui to me for evaluation  Below are my notes for this consultation  If you have questions, please do not hesitate to call me  I look forward to following your patient along with you  Sincerely,        Glenny Jenkins PA-C        CC: MD Glenny Palma PA-C  7/29/2019  3:43 PM  Sign at close encounter  Assessment/Plan:   Angel Luis Robins is a pleasant 68-year-old female who is 3 months status post reconstruction of a Mohs defect of the nose with full-thickness skin graft for the treatment of basal cell carcinoma done in conjunction with Dr Jose Cox  Please see HPI  I have recommended she use hydroquinone to lighten the color of the graft  I have also recommended that she be a little bit more aggressive in massaging the scar with silicone scar gel  Follow-up in 3 months or sooner with any concerns  Diagnoses and all orders for this visit:    Basal cell carcinoma of skin of nose          Subjective:     Patient ID: Swetha Alab is a 62 y o  female  HPI   She denies any complaints regarding her graft site  She does report having a little bit more difficulty blowing her nose since the graft  Review of Systems   Skin:        As per HPI  Objective:     Physical Exam   Skin:   Nose graft site is healing well  There is a mild tan pigment noted  Please see photo

## 2019-07-29 NOTE — PROGRESS NOTES
Assessment/Plan:   Ramone Durán is a pleasant 55-year-old female who is 3 months status post reconstruction of a Mohs defect of the nose with full-thickness skin graft for the treatment of basal cell carcinoma done in conjunction with Dr Freddie Farley  Please see HPI  I have recommended she use hydroquinone to lighten the color of the graft  I have also recommended that she be a little bit more aggressive in massaging the scar with silicone scar gel  Follow-up in 3 months or sooner with any concerns  Diagnoses and all orders for this visit:    Basal cell carcinoma of skin of nose          Subjective:     Patient ID: Brook Clifton is a 62 y o  female  HPI   She denies any complaints regarding her graft site  She does report having a little bit more difficulty blowing her nose since the graft  Review of Systems   Skin:        As per HPI  Objective:     Physical Exam   Skin:   Nose graft site is healing well  There is a mild tan pigment noted  Please see photo

## 2019-09-17 ENCOUNTER — OFFICE VISIT (OUTPATIENT)
Dept: FAMILY MEDICINE CLINIC | Facility: CLINIC | Age: 59
End: 2019-09-17
Payer: COMMERCIAL

## 2019-09-17 VITALS
HEIGHT: 64 IN | DIASTOLIC BLOOD PRESSURE: 80 MMHG | HEART RATE: 70 BPM | SYSTOLIC BLOOD PRESSURE: 120 MMHG | WEIGHT: 144.6 LBS | BODY MASS INDEX: 24.69 KG/M2 | RESPIRATION RATE: 16 BRPM

## 2019-09-17 DIAGNOSIS — J45.20 MILD INTERMITTENT ASTHMA WITHOUT COMPLICATION: ICD-10-CM

## 2019-09-17 DIAGNOSIS — K21.9 GASTROESOPHAGEAL REFLUX DISEASE, ESOPHAGITIS PRESENCE NOT SPECIFIED: ICD-10-CM

## 2019-09-17 DIAGNOSIS — M06.09 RHEUMATOID ARTHRITIS OF MULTIPLE SITES WITH NEGATIVE RHEUMATOID FACTOR (HCC): ICD-10-CM

## 2019-09-17 DIAGNOSIS — Z78.0 POSTMENOPAUSAL: ICD-10-CM

## 2019-09-17 DIAGNOSIS — I10 ESSENTIAL HYPERTENSION: Primary | ICD-10-CM

## 2019-09-17 DIAGNOSIS — Z12.39 SCREENING FOR MALIGNANT NEOPLASM OF BREAST: ICD-10-CM

## 2019-09-17 DIAGNOSIS — J31.0 CHRONIC RHINITIS: ICD-10-CM

## 2019-09-17 DIAGNOSIS — Z13.820 SCREENING FOR OSTEOPOROSIS: ICD-10-CM

## 2019-09-17 PROCEDURE — 3079F DIAST BP 80-89 MM HG: CPT | Performed by: FAMILY MEDICINE

## 2019-09-17 PROCEDURE — 3008F BODY MASS INDEX DOCD: CPT | Performed by: FAMILY MEDICINE

## 2019-09-17 PROCEDURE — 3074F SYST BP LT 130 MM HG: CPT | Performed by: FAMILY MEDICINE

## 2019-09-17 PROCEDURE — 99214 OFFICE O/P EST MOD 30 MIN: CPT | Performed by: FAMILY MEDICINE

## 2019-09-17 PROCEDURE — 93000 ELECTROCARDIOGRAM COMPLETE: CPT | Performed by: FAMILY MEDICINE

## 2019-09-17 RX ORDER — LEVALBUTEROL TARTRATE 45 UG/1
1-2 AEROSOL, METERED ORAL EVERY 6 HOURS PRN
Qty: 1 INHALER | Refills: 0 | Status: SHIPPED | OUTPATIENT
Start: 2019-09-17 | End: 2019-09-23 | Stop reason: CLARIF

## 2019-09-17 RX ORDER — FLUTICASONE PROPIONATE 50 MCG
2 SPRAY, SUSPENSION (ML) NASAL DAILY
Qty: 16 G | Refills: 6 | Status: SHIPPED | OUTPATIENT
Start: 2019-09-17 | End: 2019-12-13 | Stop reason: SDUPTHER

## 2019-09-18 NOTE — PROGRESS NOTES
Assessment/Plan:  1  Essential hypertension  - well controlled, continue HCTZ 25 mg daily  - CBC and differential  - Comprehensive metabolic panel  - TSH, 3rd generation with Free T4 reflex    2  Gastroesophageal reflux disease  - continue Aciphex 20 mg daily, doing well    3  Chronic rhinitis  - continue Flonase daily, doing well  - fluticasone (FLONASE) 50 mcg/act nasal spray; 2 sprays into each nostril daily  Dispense: 16 g; Refill: 6    4  Mild intermittent asthma  - continue Xopenex inhaler as needed, uses rarely  - levalbuterol (XOPENEX HFA) 45 mcg/act inhaler; Inhale 1-2 puffs every 6 (six) hours as needed for wheezing or shortness of breath  Dispense: 1 Inhaler; Refill: 0    5  Rheumatoid arthritis   - follow up with Rheumatology    6  Screening for osteoporosis  - DXA bone density spine hip and pelvis; Future    7  Screening for malignant neoplasm of breast  - Mammo screening bilateral w cad; Future    Follow up in 6 months or sooner as needed  The treatment plan was reviewed with the patient  The patient understands and agrees with the treatment plan        Subjective:   Chief Complaint   Patient presents with    Hypertension    GERD    Asthma      Patient ID: Megan Wilson is a 62 y o  female who presents for follow up visit for her chronic conditions  Patient states she is overall doing well, she has been compliant with her medications  For her chronic nasal rhinitis she uses Flonase nasal spray daily with good results, she denies increased nasal congestion or runny nose  For her chronic GERD symptoms she is taking Aciphex daily and doing well  Patient denies chest pain, SOB, palpitations, leg edema, orthopnea, headache or dizziness         The following portions of the patient's history were reviewed and updated as appropriate: allergies, current medications, past family history, past medical history, past social history, past surgical history and problem list     Past Medical History: Diagnosis Date    Abnormal findings on diagnostic imaging of breast     Last assessed 13    Adjustment disorder with depressed mood     Last assessed 13    Anxiety     Arthritis     hands    Erythema migrans (Lyme disease)     Last assessed 13    GERD (gastroesophageal reflux disease)     Polyarthritis     Last assessed 16    Uterine leiomyoma     Vertigo      Past Surgical History:   Procedure Laterality Date    BREAST BIOPSY  2002    BREAST SURGERY      CARPAL TUNNEL RELEASE Bilateral 10/2008    DENTAL SURGERY      DILATION AND CURETTAGE OF UTERUS      HYSTEROSCOPY      w/D and C on 06 for irregular menses    Her pathology demonstrated proliferative endometrium    INDUCED       MOHS RECONSTRUCTION Right 2019    Procedure: RECONSTRUCTION MOHS DEFECT RIGHT NOSE;  Surgeon: Marisa Fletcher MD;  Location: [facility];  Service: Plastics    UT ADJ TISS Rui Schlichter 10 1-30 SQCM Right 2019    Procedure: FLAP LOCAL RIGHT NOSE VS FTSG;  Surgeon: Marisa Fletcher MD;  Location: [facility];  Service: Plastics    TONSILLECTOMY      WRIST SURGERY Right 2009     Family History   Problem Relation Age of Onset    Arthritis Mother     Hypertension Mother     Coronary artery disease Mother     Skin cancer Mother     Varicose Veins Mother     Uterine cancer Mother     Arthritis Father     Hypertension Father     Stroke Father     Coronary artery disease Father     Other Father         Stent indications    Ulcerative colitis Sister     Rheum arthritis Maternal Grandmother     Substance Abuse Neg Hx     Mental illness Neg Hx      Social History     Socioeconomic History    Marital status: /Civil Union     Spouse name: Not on file    Number of children: Not on file    Years of education: Not on file    Highest education level: Not on file   Occupational History    Not on file   Social Needs    Financial resource strain: Not on file    Food insecurity:     Worry: Not on file     Inability: Not on file    Transportation needs:     Medical: Not on file     Non-medical: Not on file   Tobacco Use    Smoking status: Never Smoker    Smokeless tobacco: Never Used   Substance and Sexual Activity    Alcohol use: Yes     Comment: social- 2-3 glasses of wine    Drug use: No    Sexual activity: Not on file   Lifestyle    Physical activity:     Days per week: Not on file     Minutes per session: Not on file    Stress: Not on file   Relationships    Social connections:     Talks on phone: Not on file     Gets together: Not on file     Attends Caodaism service: Not on file     Active member of club or organization: Not on file     Attends meetings of clubs or organizations: Not on file     Relationship status: Not on file    Intimate partner violence:     Fear of current or ex partner: Not on file     Emotionally abused: Not on file     Physically abused: Not on file     Forced sexual activity: Not on file   Other Topics Concern    Not on file   Social History Narrative    Caffeine Use 1 cup per day    Per allscripts: Denied: History of [place]    Islam       Current Outpatient Medications:     ACIPHEX 20 MG tablet, Take 1 tablet (20 mg total) by mouth daily, Disp: 90 tablet, Rfl: 3    Cholecalciferol (VITAMIN D3) 1000 units CHEW, Chew, Disp: , Rfl:     fluticasone (FLONASE) 50 mcg/act nasal spray, 2 sprays into each nostril daily, Disp: 16 g, Rfl: 6    hydrochlorothiazide (HYDRODIURIL) 25 mg tablet, TAKE 1 TABLET DAILY, Disp: 90 tablet, Rfl: 4    Ibuprofen (ADVIL) 200 MG CAPS, Take 2 capsules by mouth 2 (two) times a day, Disp: , Rfl:     levalbuterol (XOPENEX HFA) 45 mcg/act inhaler, Inhale 1-2 puffs every 6 (six) hours as needed for wheezing or shortness of breath, Disp: 1 Inhaler, Rfl: 0    meclizine (ANTIVERT) 25 mg tablet, Take 1 tablet by mouth every 8 (eight) hours as needed for dizziness, Disp: 10 tablet, Rfl: 0   multivitamin (THERAGRAN) TABS, Take 1 tablet by mouth daily, Disp: , Rfl:     pimecrolimus (ELIDEL) 1 % cream, Apply topically, Disp: , Rfl:     Review of Systems   Constitutional: Negative for diaphoresis, fatigue and fever  HENT: Negative for congestion, ear discharge, postnasal drip, sore throat and trouble swallowing  Respiratory: Negative for cough, shortness of breath and wheezing  Cardiovascular: Negative for chest pain, palpitations and leg swelling  Gastrointestinal: Negative for abdominal pain, blood in stool, diarrhea, nausea and vomiting  Genitourinary: Negative for dysuria, frequency, hematuria, pelvic pain and urgency  Neurological: Negative for dizziness, syncope, speech difficulty, weakness, numbness and headaches  Hematological: Negative for adenopathy  Psychiatric/Behavioral: Negative for dysphoric mood and sleep disturbance  The patient is not nervous/anxious  Objective:    Vitals:    09/17/19 1620   BP: 120/80   BP Location: Left arm   Patient Position: Sitting   Cuff Size: Standard   Pulse: 70   Resp: 16   Weight: 65 6 kg (144 lb 9 6 oz)   Height: 5' 3 5" (1 613 m)     BP Readings from Last 3 Encounters:   09/17/19 120/80   04/25/19 140/68   03/26/19 132/80     Wt Readings from Last 3 Encounters:   09/17/19 65 6 kg (144 lb 9 6 oz)   07/29/19 65 6 kg (144 lb 9 6 oz)   04/29/19 65 8 kg (145 lb)      Physical Exam   Constitutional: She is oriented to person, place, and time  She appears well-developed and well-nourished  No distress  Neck: Neck supple  No thyromegaly present  Cardiovascular: Normal rate, regular rhythm and normal heart sounds  No murmur heard  Pulmonary/Chest: Effort normal  No respiratory distress  She has no wheezes  She has no rhonchi  She has no rales  Abdominal: Soft  She exhibits no distension and no mass  There is no tenderness  Musculoskeletal: She exhibits no edema  Lymphadenopathy:     She has no cervical adenopathy  Neurological: She is alert and oriented to person, place, and time  Psychiatric: She has a normal mood and affect  Her behavior is normal  Thought content normal          Office Visit on 09/17/2019   Component Date Value Ref Range Status    OTHER 09/17/2019    Final    09/17/2019   Orders Only on 04/13/2019   Component Date Value Ref Range Status    Glucose, Random 04/13/2019 99  65 - 99 mg/dL Final    Comment:               Fasting reference interval         BUN 04/13/2019 10  7 - 25 mg/dL Final    Creatinine 04/13/2019 0 81  0 50 - 0 99 mg/dL Final    Comment: For patients >52years of age, the reference limit  for Creatinine is approximately 13% higher for people  identified as -American           eGFR Non African American 04/13/2019 78  > OR = 60 mL/min/1 73m2 Final    eGFR African American 04/13/2019 91  > OR = 60 mL/min/1 73m2 Final    SL AMB BUN/CREATININE RATIO 97/46/6438 NOT APPLICABLE  6 - 22 (calc) Final    Sodium 04/13/2019 135  135 - 146 mmol/L Final    Potassium 04/13/2019 3 9  3 5 - 5 3 mmol/L Final    Chloride 04/13/2019 97* 98 - 110 mmol/L Final    CO2 04/13/2019 30  20 - 32 mmol/L Final    SL AMB CALCIUM 04/13/2019 9 6  8 6 - 10 4 mg/dL Final    White Blood Cell Count 04/13/2019 3 2* 3 8 - 10 8 Thousand/uL Final    Red Blood Cell Count 04/13/2019 4 18  3 80 - 5 10 Million/uL Final    Hemoglobin 04/13/2019 14 2  11 7 - 15 5 g/dL Final    HCT 04/13/2019 41 1  35 0 - 45 0 % Final    MCV 04/13/2019 98 3  80 0 - 100 0 fL Final    MCH 04/13/2019 34 0* 27 0 - 33 0 pg Final    MCHC 04/13/2019 34 5  32 0 - 36 0 g/dL Final    RDW 04/13/2019 11 7  11 0 - 15 0 % Final    Platelet Count 99/29/6313 252  140 - 400 Thousand/uL Final    SL AMB MPV 04/13/2019 9 7  7 5 - 12 5 fL Final    Neutrophils (Absolute) 04/13/2019 1,818  1,500 - 7,800 cells/uL Final    Lymphocytes (Absolute) 04/13/2019 915  850 - 3,900 cells/uL Final    Monocytes (Absolute) 04/13/2019 336  200 - 950 cells/uL Final    Eosinophils (Absolute) 04/13/2019 80  15 - 500 cells/uL Final    Basophils ABS 04/13/2019 51  0 - 200 cells/uL Final    Neutrophils 04/13/2019 56 8  % Final    Lymphocytes 04/13/2019 28 6  % Final    Monocytes 04/13/2019 10 5  % Final    Eosinophils 04/13/2019 2 5  % Final    Basophils PCT 04/13/2019 1 6  % Final   Office Visit on 03/26/2019   Component Date Value Ref Range Status    OTHER 03/26/2019    Final    03/26/2019

## 2019-09-19 ENCOUNTER — TELEPHONE (OUTPATIENT)
Dept: FAMILY MEDICINE CLINIC | Facility: CLINIC | Age: 59
End: 2019-09-19

## 2019-09-19 NOTE — TELEPHONE ENCOUNTER
Pharmacy called, said the inhaler you originally ordered is not covered under her insurance, what is covered is  proair hfa  ventolin  respoclick    Please reorder one of the above inhalers to [facility]

## 2019-09-23 NOTE — TELEPHONE ENCOUNTER
Patient said it's ok to change her inhaler to whatever you think is the best and similar for her to use

## 2019-11-15 ENCOUNTER — HOSPITAL ENCOUNTER (EMERGENCY)
Facility: HOSPITAL | Age: 59
Discharge: HOME | End: 2019-11-15
Attending: EMERGENCY MEDICINE
Payer: COMMERCIAL

## 2019-11-15 ENCOUNTER — APPOINTMENT (EMERGENCY)
Dept: RADIOLOGY | Facility: HOSPITAL | Age: 59
End: 2019-11-15
Payer: COMMERCIAL

## 2019-11-15 VITALS
RESPIRATION RATE: 16 BRPM | DIASTOLIC BLOOD PRESSURE: 62 MMHG | HEART RATE: 76 BPM | TEMPERATURE: 98.2 F | OXYGEN SATURATION: 98 % | SYSTOLIC BLOOD PRESSURE: 129 MMHG
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
## 2019-11-15 DIAGNOSIS — S80.11XA CONTUSION OF RIGHT LOWER EXTREMITY, INITIAL ENCOUNTER: ICD-10-CM

## 2019-11-15 DIAGNOSIS — S80.12XA CONTUSION OF LEFT LOWER EXTREMITY, INITIAL ENCOUNTER: ICD-10-CM

## 2019-11-15 DIAGNOSIS — M54.50 ACUTE LUMBAR BACK PAIN: ICD-10-CM

## 2019-11-15 PROCEDURE — 99284 EMERGENCY DEPT VISIT MOD MDM: CPT | Performed by: EMERGENCY MEDICINE

## 2019-11-15 PROCEDURE — 72100 X-RAY EXAM L-S SPINE 2/3 VWS: CPT

## 2019-11-15 PROCEDURE — 99284 EMERGENCY DEPT VISIT MOD MDM: CPT

## 2019-11-15 RX ORDER — ACETAMINOPHEN 500 MG
500 TABLET ORAL EVERY 6 HOURS PRN
Qty: 30 TABLET | Refills: 0 | Status: SHIPPED | OUTPATIENT
Start: 2019-11-15 | End: 2020-03-17 | Stop reason: ALTCHOICE

## 2019-11-15 RX ORDER — IBUPROFEN 600 MG/1
600 TABLET ORAL ONCE
Status: COMPLETED | OUTPATIENT
Start: 2019-11-15 | End: 2019-11-15

## 2019-11-15 RX ADMIN — IBUPROFEN 600 MG: 600 TABLET, FILM COATED ORAL at 16:38
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
## 2019-11-20 ENCOUNTER — ANNUAL EXAM (OUTPATIENT)
Dept: OBGYN CLINIC | Facility: CLINIC | Age: 59
End: 2019-11-20
Payer: COMMERCIAL

## 2019-11-20 VITALS
WEIGHT: 145.6 LBS | SYSTOLIC BLOOD PRESSURE: 120 MMHG | BODY MASS INDEX: 24.26 KG/M2 | DIASTOLIC BLOOD PRESSURE: 76 MMHG | HEIGHT: 65 IN

## 2019-11-20 DIAGNOSIS — D25.9 UTERINE LEIOMYOMA, UNSPECIFIED LOCATION: ICD-10-CM

## 2019-11-20 DIAGNOSIS — N95.2 ATROPHY OF VAGINA: ICD-10-CM

## 2019-11-20 DIAGNOSIS — Z01.419 WOMEN'S ANNUAL ROUTINE GYNECOLOGICAL EXAMINATION: ICD-10-CM

## 2019-11-20 DIAGNOSIS — Z12.31 ENCOUNTER FOR SCREENING MAMMOGRAM FOR MALIGNANT NEOPLASM OF BREAST: Primary | ICD-10-CM

## 2019-11-20 PROCEDURE — 99396 PREV VISIT EST AGE 40-64: CPT | Performed by: OBSTETRICS & GYNECOLOGY
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
## 2019-12-02 ENCOUNTER — OFFICE VISIT (OUTPATIENT)
Dept: PLASTIC SURGERY | Facility: CLINIC | Age: 59
End: 2019-12-02
Payer: COMMERCIAL

## 2019-12-02 DIAGNOSIS — Z85.828 HISTORY OF BASAL CELL CARCINOMA: Primary | ICD-10-CM

## 2019-12-02 PROCEDURE — 99212 OFFICE O/P EST SF 10 MIN: CPT | Performed by: PHYSICIAN ASSISTANT

## 2019-12-02 NOTE — LETTER
December 2, 2019     Kodak Rosas MD  [address] [facility] [address] [id number]    Patient: Nicole Silverman   YOB: 1960   Date of Visit: 12/2/2019       Dear Dr Brea Lenz:    Thank you for referring Hardin Cabot to me for evaluation  Below are my notes for this consultation  If you have questions, please do not hesitate to call me  I look forward to following your patient along with you  Sincerely,        Lg Chow PA-C        CC: MD Pito Epstein MD Delbert Starring, PA-C  12/2/2019  4:02 PM  Sign at close encounter  Assessment/Plan:   Jayson Olivera is a pleasant 23-year-old female who is  7 months status post reconstruction of a Mohs defect of the nose with full-thickness skin graft for the treatment of basal cell carcinoma done in conjunction with Dr Handy   Please see HPI  She is pleased with her results and will follow up with us on an as needed basis  Diagnoses and all orders for this visit:    History of basal cell carcinoma          Subjective:     Patient ID: Nicole Silverman is a 62 y o  female  HPI   She reports that she is pleased the appearance of her scar  Review of Systems   Musculoskeletal:        As per HPI  Objective:     Physical Exam   Skin:   Right nose graft site is well healed without evidence for pigment changes or hypertrophy

## 2019-12-02 NOTE — PROGRESS NOTES
Assessment/Plan:   Travis Juarez is a pleasant 47-year-old female who is 7 months status post reconstruction of a Mohs defect of the nose with full-thickness skin graft for the treatment of basal cell carcinoma done in conjunction with Dr Handy   Please see HPI  She is pleased with her results and will follow up with us on an as needed basis  Diagnoses and all orders for this visit:    History of basal cell carcinoma          Subjective:     Patient ID: Severo Callas is a 62 y o  female  HPI   She reports that she is pleased the appearance of her scar  Review of Systems   Musculoskeletal:        As per HPI  Objective:     Physical Exam   Skin:   Right nose graft site is well healed without evidence for pigment changes or hypertrophy

## 2019-12-13 DIAGNOSIS — J31.0 CHRONIC RHINITIS: ICD-10-CM

## 2019-12-13 DIAGNOSIS — J45.20 MILD INTERMITTENT ASTHMA WITHOUT COMPLICATION: ICD-10-CM

## 2019-12-13 RX ORDER — ALBUTEROL SULFATE 90 UG/1
2 AEROSOL, METERED RESPIRATORY (INHALATION) EVERY 6 HOURS PRN
Qty: 3 EACH | Refills: 0 | Status: SHIPPED | OUTPATIENT
Start: 2019-12-13

## 2019-12-13 RX ORDER — FLUTICASONE PROPIONATE 50 MCG
2 SPRAY, SUSPENSION (ML) NASAL DAILY
Qty: 48 G | Refills: 1 | Status: SHIPPED | OUTPATIENT
Start: 2019-12-13 | End: 2021-04-25

## 2020-01-28 ENCOUNTER — HOSPITAL ENCOUNTER (OUTPATIENT)
Dept: RADIOLOGY | Age: 60
Discharge: HOME | End: 2020-01-28
Payer: COMMERCIAL

## 2020-01-28 VITALS — WEIGHT: 147 LBS | HEIGHT: 65 IN | BODY MASS INDEX: 24.49 KG/M2

## 2020-01-28 DIAGNOSIS — Z12.31 ENCOUNTER FOR SCREENING MAMMOGRAM FOR MALIGNANT NEOPLASM OF BREAST: ICD-10-CM

## 2020-01-28 PROCEDURE — 77067 SCR MAMMO BI INCL CAD: CPT

## 2020-01-28 PROCEDURE — 77063 BREAST TOMOSYNTHESIS BI: CPT

## 2020-03-15 LAB
ALBUMIN SERPL-MCNC: 4.3 G/DL (ref 3.6–5.1)
ALBUMIN/GLOB SERPL: 2 (CALC) (ref 1–2.5)
ALP SERPL-CCNC: 85 U/L (ref 37–153)
ALT SERPL-CCNC: 13 U/L (ref 6–29)
AST SERPL-CCNC: 19 U/L (ref 10–35)
BASOPHILS # BLD AUTO: 50 CELLS/UL (ref 0–200)
BASOPHILS NFR BLD AUTO: 1.2 %
BILIRUB SERPL-MCNC: 0.7 MG/DL (ref 0.2–1.2)
BUN SERPL-MCNC: 15 MG/DL (ref 7–25)
BUN/CREAT SERPL: NORMAL (CALC) (ref 6–22)
CALCIUM SERPL-MCNC: 9.4 MG/DL (ref 8.6–10.4)
CHLORIDE SERPL-SCNC: 101 MMOL/L (ref 98–110)
CO2 SERPL-SCNC: 30 MMOL/L (ref 20–32)
CREAT SERPL-MCNC: 0.85 MG/DL (ref 0.5–0.99)
EOSINOPHIL # BLD AUTO: 143 CELLS/UL (ref 15–500)
EOSINOPHIL NFR BLD AUTO: 3.4 %
ERYTHROCYTE [DISTWIDTH] IN BLOOD BY AUTOMATED COUNT: 11.8 % (ref 11–15)
GLOBULIN SER CALC-MCNC: 2.2 G/DL (CALC) (ref 1.9–3.7)
GLUCOSE SERPL-MCNC: 98 MG/DL (ref 65–99)
HCT VFR BLD AUTO: 39.9 % (ref 35–45)
HGB BLD-MCNC: 13.5 G/DL (ref 11.7–15.5)
LYMPHOCYTES # BLD AUTO: 1256 CELLS/UL (ref 850–3900)
LYMPHOCYTES NFR BLD AUTO: 29.9 %
MCH RBC QN AUTO: 34.2 PG (ref 27–33)
MCHC RBC AUTO-ENTMCNC: 33.8 G/DL (ref 32–36)
MCV RBC AUTO: 101 FL (ref 80–100)
MONOCYTES # BLD AUTO: 416 CELLS/UL (ref 200–950)
MONOCYTES NFR BLD AUTO: 9.9 %
NEUTROPHILS # BLD AUTO: 2335 CELLS/UL (ref 1500–7800)
NEUTROPHILS NFR BLD AUTO: 55.6 %
PLATELET # BLD AUTO: 233 THOUSAND/UL (ref 140–400)
PMV BLD REES-ECKER: 9.7 FL (ref 7.5–12.5)
POTASSIUM SERPL-SCNC: 3.8 MMOL/L (ref 3.5–5.3)
PROT SERPL-MCNC: 6.5 G/DL (ref 6.1–8.1)
RBC # BLD AUTO: 3.95 MILLION/UL (ref 3.8–5.1)
SL AMB EGFR AFRICAN AMERICAN: 85 ML/MIN/1.73M2
SL AMB EGFR NON AFRICAN AMERICAN: 73 ML/MIN/1.73M2
SODIUM SERPL-SCNC: 139 MMOL/L (ref 135–146)
TSH SERPL-ACNC: 1.55 MIU/L (ref 0.4–4.5)
WBC # BLD AUTO: 4.2 THOUSAND/UL (ref 3.8–10.8)

## 2020-03-17 ENCOUNTER — OFFICE VISIT (OUTPATIENT)
Dept: FAMILY MEDICINE CLINIC | Facility: CLINIC | Age: 60
End: 2020-03-17
Payer: COMMERCIAL

## 2020-03-17 VITALS
RESPIRATION RATE: 16 BRPM | HEIGHT: 64 IN | SYSTOLIC BLOOD PRESSURE: 128 MMHG | BODY MASS INDEX: 24.69 KG/M2 | DIASTOLIC BLOOD PRESSURE: 80 MMHG | WEIGHT: 144.6 LBS | HEART RATE: 78 BPM

## 2020-03-17 DIAGNOSIS — H93.11 TINNITUS OF RIGHT EAR: ICD-10-CM

## 2020-03-17 DIAGNOSIS — K21.9 GASTROESOPHAGEAL REFLUX DISEASE, ESOPHAGITIS PRESENCE NOT SPECIFIED: ICD-10-CM

## 2020-03-17 DIAGNOSIS — J31.0 CHRONIC RHINITIS: ICD-10-CM

## 2020-03-17 DIAGNOSIS — J45.20 MILD INTERMITTENT ASTHMA WITHOUT COMPLICATION: ICD-10-CM

## 2020-03-17 DIAGNOSIS — M06.09 RHEUMATOID ARTHRITIS OF MULTIPLE SITES WITH NEGATIVE RHEUMATOID FACTOR (HCC): ICD-10-CM

## 2020-03-17 DIAGNOSIS — R09.81 SINUS CONGESTION: ICD-10-CM

## 2020-03-17 DIAGNOSIS — I10 ESSENTIAL HYPERTENSION: Primary | ICD-10-CM

## 2020-03-17 PROCEDURE — 99214 OFFICE O/P EST MOD 30 MIN: CPT | Performed by: FAMILY MEDICINE

## 2020-03-17 PROCEDURE — 3074F SYST BP LT 130 MM HG: CPT | Performed by: FAMILY MEDICINE

## 2020-03-17 PROCEDURE — 3008F BODY MASS INDEX DOCD: CPT | Performed by: FAMILY MEDICINE

## 2020-03-17 PROCEDURE — 3079F DIAST BP 80-89 MM HG: CPT | Performed by: FAMILY MEDICINE

## 2020-03-17 PROCEDURE — 1036F TOBACCO NON-USER: CPT | Performed by: FAMILY MEDICINE

## 2020-03-17 RX ORDER — PREDNISONE 10 MG/1
TABLET ORAL
Qty: 20 TABLET | Refills: 0 | Status: SHIPPED | OUTPATIENT
Start: 2020-03-17 | End: 2020-08-19 | Stop reason: ALTCHOICE

## 2020-03-17 NOTE — PROGRESS NOTES
Assessment/Plan:    1  Essential hypertension  - well controlled, continue HCTZ 25 mg daily    2  Sinus congestion/ Chronic rhinitis  - continue Flonase and saline nasal rinse, start Prednisone taper and see ENT  - predniSONE 10 mg tablet; Take 4 tablets daily with food for 2 days, 3 tablets daily for 2 days, 2 tablets daily for 2 days, 1 tablet daily for 2 days  Dispense: 20 tablet; Refill: 0    3  Tinnitus of right ear  - Ambulatory Referral to Otolaryngology; Future    4  Gastroesophageal reflux disease  - continue Aciphex 20 mg daily, doing well  - following with GI    5  Mild intermittent asthma  - continue Xopenex inhaler as needed, uses rarely    6  Rheumatoid arthritis   - follow up with Rheumatology       Follow up in 6 months or sooner as needed  The treatment plan and possible side effects of new medications were reviewed with the patient today  The patient understands and agrees with the treatment plan  Subjective:   Chief Complaint   Patient presents with    Hypertension    GERD    Chronic Rhinitis    Asthma      Patient ID: Jamal Alarcon is a 56 y o  female who presents today for follow up visit for her chronic conditions  Patient states she has been having increased sinus congestion, postnasal drip and right sided facial pain/ pressure for the past 2 weeks, no fever/ chills, no body aches, no cough, no recent travel, no chest pain or SOB  She has been using daily Flonase daily and saline nasal rinse with slight relief of her symptoms  Patient also reports intermittent ringing in her right ear for the past few years, no dizziness, no ear pain, no decreased hearing, she was seen by ENT in 2017 and had her hearing evaluation done and was advised her hearing was normal    Patient has no other complaints, she has been compliant with her medications, she is followed by GI for chronic GERD and doing well on her Aciphex           The following portions of the patient's history were reviewed and updated as appropriate: allergies, current medications, past family history, past medical history, past social history, past surgical history and problem list     Past Medical History:   Diagnosis Date    Abnormal findings on diagnostic imaging of breast     Last assessed 13    Adjustment disorder with depressed mood     Last assessed 13    Anxiety     Arthritis     hands    Erythema migrans (Lyme disease)     Last assessed 13    GERD (gastroesophageal reflux disease)     Polyarthritis     Last assessed 16    Uterine leiomyoma     Vertigo      Past Surgical History:   Procedure Laterality Date    BREAST BIOPSY  2002    BREAST SURGERY      CARPAL TUNNEL RELEASE Bilateral 10/2008    DENTAL SURGERY      DILATION AND CURETTAGE OF UTERUS      HYSTEROSCOPY      w/D and C on 06 for irregular menses    Her pathology demonstrated proliferative endometrium    INDUCED       MOHS RECONSTRUCTION Right 2019    Procedure: RECONSTRUCTION MOHS DEFECT RIGHT NOSE;  Surgeon: Sherie Carreno MD;  [facility];  Service: Plastics    [place] ADJ TISS Aretta Pouch 10 1-30 SQCM Right 2019    Procedure: FLAP LOCAL RIGHT NOSE VS FTSG;  Surgeon: Sherie Carreno MD;  [facility];  Service: Plastics    TONSILLECTOMY      WRIST SURGERY Right      Family History   Problem Relation Age of Onset    Arthritis Mother     Hypertension Mother     Coronary artery disease Mother     Skin cancer Mother     Varicose Veins Mother     Uterine cancer Mother     Arthritis Father     Hypertension Father     Stroke Father     Coronary artery disease Father     Other Father         Stent indications    Ulcerative colitis Sister     Rheum arthritis Maternal Grandmother     Substance Abuse Neg Hx     Mental illness Neg Hx      Social History     Socioeconomic History    Marital status: /Civil Union     Spouse name: Not on file    Number of children: Not on file    Years of education: Not on file    Highest education level: Not on file   Occupational History    Not on file   Social Needs    Financial resource strain: Not on file    Food insecurity:     Worry: Not on file     Inability: Not on file    Transportation needs:     Medical: Not on file     Non-medical: Not on file   Tobacco Use    Smoking status: Never Smoker    Smokeless tobacco: Never Used   Substance and Sexual Activity    Alcohol use: Yes     Comment: social- 2-3 glasses of wine    Drug use: No    Sexual activity: Not on file   Lifestyle    Physical activity:     Days per week: Not on file     Minutes per session: Not on file    Stress: Not on file   Relationships    Social connections:     Talks on phone: Not on file     Gets together: Not on file     Attends [employer]ist service: Not on file     Active member of club or organization: Not on file     Attends meetings of clubs or organizations: Not on file     Relationship status: Not on file    Intimate partner violence:     Fear of current or ex partner: Not on file     Emotionally abused: Not on file     Physically abused: Not on file     Forced sexual activity: Not on file   Other Topics Concern    Not on file   Social History Narrative    Caffeine Use 1 cup per day    Per allscripts: Denied: History of [place]    Synagogue       Current Outpatient Medications:     ACIPHEX 20 MG tablet, Take 1 tablet (20 mg total) by mouth daily, Disp: 90 tablet, Rfl: 3    albuterol (VENTOLIN HFA) 90 mcg/act inhaler, Inhale 2 puffs every 6 (six) hours as needed for wheezing or shortness of breath, Disp: 3 each, Rfl: 0    Cholecalciferol (VITAMIN D3) 1000 units CHEW, Chew, Disp: , Rfl:     fluticasone (FLONASE) 50 mcg/act nasal spray, 2 sprays into each nostril daily, Disp: 48 g, Rfl: 1    hydrochlorothiazide (HYDRODIURIL) 25 mg tablet, TAKE 1 TABLET DAILY, Disp: 90 tablet, Rfl: 4    Ibuprofen (ADVIL) 200 MG CAPS, Take 2 capsules by mouth 2 (two) times a day, Disp: , Rfl:     meclizine (ANTIVERT) 25 mg tablet, Take 1 tablet by mouth every 8 (eight) hours as needed for dizziness, Disp: 10 tablet, Rfl: 0    multivitamin (THERAGRAN) TABS, Take 1 tablet by mouth daily, Disp: , Rfl:     pimecrolimus (ELIDEL) 1 % cream, Apply topically, Disp: , Rfl:     Review of Systems   Constitutional: Negative for diaphoresis, fatigue and fever  HENT: Positive for congestion, postnasal drip, sinus pressure, sinus pain and tinnitus  Negative for ear discharge, ear pain, sore throat, trouble swallowing and voice change  Respiratory: Negative for cough, shortness of breath and wheezing  Cardiovascular: Negative for chest pain, palpitations and leg swelling  Gastrointestinal: Negative for abdominal pain, blood in stool, diarrhea, nausea and vomiting  Genitourinary: Negative for dysuria, frequency, hematuria, pelvic pain and urgency  Neurological: Negative for dizziness, syncope, speech difficulty, weakness, numbness and headaches  Hematological: Negative for adenopathy  Psychiatric/Behavioral: Negative for dysphoric mood and sleep disturbance  The patient is not nervous/anxious  Objective:    Vitals:    03/17/20 1607   BP: 128/80   BP Location: Left arm   Patient Position: Sitting   Cuff Size: Standard   Pulse: 78   Resp: 16   Weight: 65 6 kg (144 lb 9 6 oz)   Height: 5' 3 5" (1 613 m)        Physical Exam   Constitutional: She is oriented to person, place, and time  She appears well-developed and well-nourished  No distress  HENT:   Head: Normocephalic and atraumatic  Right Ear: Tympanic membrane and ear canal normal    Left Ear: Tympanic membrane and ear canal normal    Nose: Mucosal edema present  Mouth/Throat: No oropharyngeal exudate or posterior oropharyngeal erythema  Neck: Neck supple  No thyromegaly present  Cardiovascular: Normal rate, regular rhythm and normal heart sounds     No murmur heard   Pulmonary/Chest: Effort normal and breath sounds normal  No respiratory distress  She has no wheezes  She has no rhonchi  She has no rales  Abdominal: Soft  Bowel sounds are normal  She exhibits no mass  There is no tenderness  Musculoskeletal: She exhibits no edema  Lymphadenopathy:     She has no cervical adenopathy  Neurological: She is alert and oriented to person, place, and time  Psychiatric: She has a normal mood and affect  Her behavior is normal  Thought content normal        No visits with results within 1 Week(s) from this visit     Latest known visit with results is:   Office Visit on 09/17/2019   Component Date Value Ref Range Status    OTHER 09/17/2019    Final    09/17/2019    White Blood Cell Count 03/14/2020 4 2  3 8 - 10 8 Thousand/uL Final    Red Blood Cell Count 03/14/2020 3 95  3 80 - 5 10 Million/uL Final    Hemoglobin 03/14/2020 13 5  11 7 - 15 5 g/dL Final    HCT 03/14/2020 39 9  35 0 - 45 0 % Final    MCV 03/14/2020 101 0* 80 0 - 100 0 fL Final    MCH 03/14/2020 34 2* 27 0 - 33 0 pg Final    MCHC 03/14/2020 33 8  32 0 - 36 0 g/dL Final    RDW 03/14/2020 11 8  11 0 - 15 0 % Final    Platelet Count 78/00/4147 233  140 - 400 Thousand/uL Final    SL AMB MPV 03/14/2020 9 7  7 5 - 12 5 fL Final    Neutrophils (Absolute) 03/14/2020 2,335  1,500 - 7,800 cells/uL Final    Lymphocytes (Absolute) 03/14/2020 1,256  850 - 3,900 cells/uL Final    Monocytes (Absolute) 03/14/2020 416  200 - 950 cells/uL Final    Eosinophils (Absolute) 03/14/2020 143  15 - 500 cells/uL Final    Basophils ABS 03/14/2020 50  0 - 200 cells/uL Final    Neutrophils 03/14/2020 55 6  % Final    Lymphocytes 03/14/2020 29 9  % Final    Monocytes 03/14/2020 9 9  % Final    Eosinophils 03/14/2020 3 4  % Final    Basophils PCT 03/14/2020 1 2  % Final    Glucose, Random 03/14/2020 98  65 - 99 mg/dL Final    Comment:               Fasting reference interval         BUN 03/14/2020 15  7 - 25 mg/dL Final    Creatinine 03/14/2020 0 85  0 50 - 0 99 mg/dL Final    Comment: For patients >52years of age, the reference limit  for Creatinine is approximately 13% higher for people  identified as -American   eGFR Non  03/14/2020 73  > OR = 60 mL/min/1 73m2 Final    eGFR African American 03/14/2020 85  > OR = 60 mL/min/1 73m2 Final    SL AMB BUN/CREATININE RATIO 50/09/7482 NOT APPLICABLE  6 - 22 (calc) Final    Sodium 03/14/2020 139  135 - 146 mmol/L Final    Potassium 03/14/2020 3 8  3 5 - 5 3 mmol/L Final    Chloride 03/14/2020 101  98 - 110 mmol/L Final    CO2 03/14/2020 30  20 - 32 mmol/L Final    Calcium 03/14/2020 9 4  8 6 - 10 4 mg/dL Final    Protein, Total 03/14/2020 6 5  6 1 - 8 1 g/dL Final    Albumin 03/14/2020 4 3  3 6 - 5 1 g/dL Final    Globulin 03/14/2020 2 2  1 9 - 3 7 g/dL (calc) Final    Albumin/Globulin Ratio 03/14/2020 2 0  1 0 - 2 5 (calc) Final    TOTAL BILIRUBIN 03/14/2020 0 7  0 2 - 1 2 mg/dL Final    Alkaline Phosphatase 03/14/2020 85  37 - 153 U/L Final    AST 03/14/2020 19  10 - 35 U/L Final    ALT 03/14/2020 13  6 - 29 U/L Final    TSH W/RFX TO FREE T4 03/14/2020 1 55  0 40 - 4 50 mIU/L Final       MRI BRAIN WITHOUT CONTRAST 11/15/2017    FINDINGS:     BRAIN PARENCHYMA:  There is no discrete mass, mass effect or midline shift  No abnormal white matter signal identified  Brainstem and cerebellum demonstrate normal signal  There is no intracranial hemorrhage    There is no evidence of acute infarction and   diffusion imaging is unremarkable        VENTRICLES:  The ventricles are normal in size and contour      SELLA AND PITUITARY GLAND:  Normal      ORBITS:  Normal      PARANASAL SINUSES:  Normal     VASCULATURE:  Evaluation of the major intracranial vasculature demonstrates appropriate flow voids      CALVARIUM AND SKULL BASE:  Trace medial left mastoid effusion likely not clinically significant     EXTRACRANIAL SOFT TISSUES: Normal      IMPRESSION:  No significant intracranial abnormalities identified     Trace left mastoid effusion, likely incidental

## 2020-08-19 ENCOUNTER — APPOINTMENT (OUTPATIENT)
Dept: RADIOLOGY | Facility: CLINIC | Age: 60
End: 2020-08-19
Payer: COMMERCIAL

## 2020-08-19 ENCOUNTER — OFFICE VISIT (OUTPATIENT)
Dept: FAMILY MEDICINE CLINIC | Facility: CLINIC | Age: 60
End: 2020-08-19
Payer: COMMERCIAL

## 2020-08-19 VITALS
HEIGHT: 63 IN | WEIGHT: 139.1 LBS | HEART RATE: 78 BPM | TEMPERATURE: 98 F | BODY MASS INDEX: 24.64 KG/M2 | DIASTOLIC BLOOD PRESSURE: 84 MMHG | SYSTOLIC BLOOD PRESSURE: 132 MMHG | RESPIRATION RATE: 16 BRPM

## 2020-08-19 DIAGNOSIS — M25.561 ACUTE PAIN OF RIGHT KNEE: ICD-10-CM

## 2020-08-19 DIAGNOSIS — M25.561 ACUTE PAIN OF RIGHT KNEE: Primary | ICD-10-CM

## 2020-08-19 PROCEDURE — 73564 X-RAY EXAM KNEE 4 OR MORE: CPT

## 2020-08-19 PROCEDURE — 3079F DIAST BP 80-89 MM HG: CPT | Performed by: FAMILY MEDICINE

## 2020-08-19 PROCEDURE — 99213 OFFICE O/P EST LOW 20 MIN: CPT | Performed by: FAMILY MEDICINE

## 2020-08-19 PROCEDURE — 3008F BODY MASS INDEX DOCD: CPT | Performed by: FAMILY MEDICINE

## 2020-08-19 PROCEDURE — 1036F TOBACCO NON-USER: CPT | Performed by: FAMILY MEDICINE

## 2020-08-19 PROCEDURE — 3075F SYST BP GE 130 - 139MM HG: CPT | Performed by: FAMILY MEDICINE

## 2020-08-19 RX ORDER — TRAMADOL HYDROCHLORIDE 50 MG/1
50 TABLET ORAL EVERY 6 HOURS PRN
Qty: 20 TABLET | Refills: 0 | Status: SHIPPED | OUTPATIENT
Start: 2020-08-19 | End: 2020-10-27 | Stop reason: ALTCHOICE

## 2020-08-20 NOTE — PROGRESS NOTES
Assessment/Plan:    1  Acute pain of right knee  - discussed rest, ice, Tylenol 500 mg 2 tablets every 6 hours as needed and Tramadol at bedtime, will obtain x-ray right knee and call pateint with results, discussed Ortho referral if not better  - traMADol (ULTRAM) 50 mg tablet; Take 1 tablet (50 mg total) by mouth every 6 (six) hours as needed for moderate pain  Dispense: 20 tablet; Refill: 0       Possible side effects of new medications were reviewed with the patient today  The patient understands and agrees with the treatment plan  Subjective:   Chief Complaint   Patient presents with    Knee Pain     R       Patient ID: Fabrice Chambers is a 56 y o  female who presents with right knee pian and swelling onset on Sunday 3 days ago after doing a lot of yard work on Saturday and carrying and stacking firewood on Saturday and Sunday  Patient reports right knee pian over anterior/ medial aspect which is worse when getting up, climbing stairs with prolonged walking/ standing  Patient denies knee redness, warmth, locking or giving way  Patient has been taking Aleve twice a day with slight relief of her pain           The following portions of the patient's history were reviewed and updated as appropriate: allergies, current medications, past family history, past medical history, past social history, past surgical history and problem list     Past Medical History:   Diagnosis Date    Abnormal findings on diagnostic imaging of breast     Last assessed 08/16/13    Adjustment disorder with depressed mood     Last assessed 05/17/13    Anxiety     Arthritis     hands    Erythema migrans (Lyme disease)     Last assessed 06/29/13    GERD (gastroesophageal reflux disease)     Polyarthritis     Last assessed 02/17/16    Uterine leiomyoma     Vertigo      Past Surgical History:   Procedure Laterality Date    BREAST BIOPSY  02/2002    BREAST SURGERY      CARPAL TUNNEL RELEASE Bilateral 10/2008   [facility]ERY  DILATION AND CURETTAGE OF UTERUS      HYSTEROSCOPY      w/D and C on 06 for irregular menses    Her pathology demonstrated proliferative endometrium    INDUCED       MOHS RECONSTRUCTION Right 2019    Procedure: RECONSTRUCTION MOHS DEFECT RIGHT NOSE;  Surgeon: Tiffany Ohara MD;  [facility];  Service: Plastics    [place] ADJ TISS Sheron 88 10 1-30 SQCM Right 2019    Procedure: FLAP LOCAL RIGHT NOSE VS FTSG;  Surgeon: Tiffany Ohara MD;  [facility];  Service: Plastics    TONSILLECTOMY      WRIST SURGERY Right      Family History   Problem Relation Age of Onset    Arthritis Mother     Hypertension Mother     Coronary artery disease Mother     Skin cancer Mother     Varicose Veins Mother     Uterine cancer Mother     Arthritis Father     Hypertension Father     Stroke Father     Coronary artery disease Father     Other Father         Stent indications    Ulcerative colitis Sister     Rheum arthritis Maternal Grandmother     Substance Abuse Neg Hx     Mental illness Neg Hx      Social History     Socioeconomic History    Marital status: /Civil Union     Spouse name: Not on file    Number of children: Not on file    Years of education: Not on file    Highest education level: Not on file   Occupational History    Not on file   Social Needs    Financial resource strain: Not on file    Food insecurity     Worry: Not on file     Inability: Not on file   [employer] needs     Medical: Not on file     Non-medical: Not on file   Tobacco Use    Smoking status: Never Smoker    Smokeless tobacco: Never Used   Substance and Sexual Activity    Alcohol use: Yes     Comment: social- 2-3 glasses of wine    Drug use: No    Sexual activity: Not on file   Lifestyle    Physical activity     Days per week: Not on file     Minutes per session: Not on file    Stress: Not on file   Relationships    Social connections     Talks on phone: Not on file     Gets together: Not on file     Attends Dru[employer] service: Not on file     Active member of club or organization: Not on file     Attends meetings of clubs or organizations: Not on file     Relationship status: Not on file    Intimate partner violence     Fear of current or ex partner: Not on file     Emotionally abused: Not on file     Physically abused: Not on file     Forced sexual activity: Not on file   Other Topics Concern    Not on file   Social History Narrative    Caffeine Use 1 cup per day    Per allscripts: Denied: History of [place]    Evangelical       Current Outpatient Medications:     ACIPHEX 20 MG tablet, Take 1 tablet (20 mg total) by mouth daily, Disp: 90 tablet, Rfl: 3    albuterol (VENTOLIN HFA) 90 mcg/act inhaler, Inhale 2 puffs every 6 (six) hours as needed for wheezing or shortness of breath, Disp: 3 each, Rfl: 0    Cholecalciferol (VITAMIN D3) 1000 units CHEW, Chew, Disp: , Rfl:     fluticasone (FLONASE) 50 mcg/act nasal spray, 2 sprays into each nostril daily, Disp: 48 g, Rfl: 1    hydrochlorothiazide (HYDRODIURIL) 25 mg tablet, TAKE 1 TABLET DAILY, Disp: 90 tablet, Rfl: 4    Ibuprofen (ADVIL) 200 MG CAPS, Take 2 capsules by mouth 2 (two) times a day, Disp: , Rfl:     meclizine (ANTIVERT) 25 mg tablet, Take 1 tablet by mouth every 8 (eight) hours as needed for dizziness, Disp: 10 tablet, Rfl: 0    multivitamin (THERAGRAN) TABS, Take 1 tablet by mouth daily, Disp: , Rfl:     pimecrolimus (ELIDEL) 1 % cream, Apply topically, Disp: , Rfl:     traMADol (ULTRAM) 50 mg tablet, Take 1 tablet (50 mg total) by mouth every 6 (six) hours as needed for moderate pain, Disp: 20 tablet, Rfl: 0    Review of Systems   Constitutional: Negative for chills and fever  Respiratory: Negative for shortness of breath  Cardiovascular: Negative for chest pain, palpitations and leg swelling  Musculoskeletal:        Right knee pain   Neurological: Negative for weakness and numbness  Objective:    Vitals:    08/19/20 1502   BP: 132/84   BP Location: Left arm   Patient Position: Sitting   Cuff Size: Standard   Pulse: 78   Resp: 16   Temp: 98 °F (36 7 °C)   Weight: 63 1 kg (139 lb 1 6 oz)   Height: 5' 3 25" (1 607 m)        Physical Exam  Constitutional:       General: She is not in acute distress  Appearance: Normal appearance  Musculoskeletal:      Right lower leg: No edema  Left lower leg: No edema  Comments: Right knee without erythema or significant effusion, ROM with pain on flexion, tenderness over medical joint line, no ligamentous laxity   Neurological:      Mental Status: She is alert and oriented to person, place, and time     Psychiatric:         Mood and Affect: Mood normal          Behavior: Behavior normal

## 2020-08-24 ENCOUNTER — TELEPHONE (OUTPATIENT)
Dept: FAMILY MEDICINE CLINIC | Facility: CLINIC | Age: 60
End: 2020-08-24

## 2020-08-24 NOTE — TELEPHONE ENCOUNTER
----- Message from Tere Jay MD sent at 8/24/2020  4:24 PM EDT -----  Please let patient know that her right knee x-ray was good

## 2020-09-03 ENCOUNTER — TELEPHONE (OUTPATIENT)
Dept: FAMILY MEDICINE CLINIC | Facility: CLINIC | Age: 60
End: 2020-09-03

## 2020-09-03 DIAGNOSIS — M25.561 ACUTE PAIN OF RIGHT KNEE: Primary | ICD-10-CM

## 2020-09-03 NOTE — TELEPHONE ENCOUNTER
Saw you about knee pain  Got the xray results that were normal   She is still having a lot of pain, especially with trying to raise her leg  Feels a "pulling" sensation  Wants to know what to do next?

## 2020-09-08 ENCOUNTER — OFFICE VISIT (OUTPATIENT)
Dept: OBGYN CLINIC | Facility: CLINIC | Age: 60
End: 2020-09-08
Payer: COMMERCIAL

## 2020-09-08 VITALS
SYSTOLIC BLOOD PRESSURE: 136 MMHG | BODY MASS INDEX: 22.73 KG/M2 | DIASTOLIC BLOOD PRESSURE: 84 MMHG | HEART RATE: 77 BPM | WEIGHT: 141.4 LBS | HEIGHT: 66 IN

## 2020-09-08 DIAGNOSIS — M25.561 ACUTE PAIN OF RIGHT KNEE: ICD-10-CM

## 2020-09-08 DIAGNOSIS — M17.11 PRIMARY OSTEOARTHRITIS OF RIGHT KNEE: Primary | ICD-10-CM

## 2020-09-08 PROCEDURE — 99203 OFFICE O/P NEW LOW 30 MIN: CPT | Performed by: PHYSICAL MEDICINE & REHABILITATION

## 2020-09-08 PROCEDURE — 20610 DRAIN/INJ JOINT/BURSA W/O US: CPT | Performed by: PHYSICAL MEDICINE & REHABILITATION

## 2020-09-08 RX ORDER — LIDOCAINE HYDROCHLORIDE 10 MG/ML
5 INJECTION, SOLUTION INFILTRATION; PERINEURAL
Status: COMPLETED | OUTPATIENT
Start: 2020-09-08 | End: 2020-09-08

## 2020-09-08 RX ORDER — TRIAMCINOLONE ACETONIDE 40 MG/ML
40 INJECTION, SUSPENSION INTRA-ARTICULAR; INTRAMUSCULAR
Status: COMPLETED | OUTPATIENT
Start: 2020-09-08 | End: 2020-09-08

## 2020-09-08 RX ADMIN — TRIAMCINOLONE ACETONIDE 40 MG: 40 INJECTION, SUSPENSION INTRA-ARTICULAR; INTRAMUSCULAR at 12:17

## 2020-09-08 RX ADMIN — LIDOCAINE HYDROCHLORIDE 5 ML: 10 INJECTION, SOLUTION INFILTRATION; PERINEURAL at 12:17

## 2020-09-08 NOTE — PATIENT INSTRUCTIONS
You had a cortisone (steroid) injection  There are two medicines in this injection, a numbing medicine and a steroid  The numbing medication component usually takes effect within a few minutes and wears off after a few hours, after which your pain may return  The steroid medication typically takes effect in 3-5 days, although some people have benefits sooner, and lasts for a much longer time  You will be starting physical therapy  It is important to do a home exercise program as you go through PT to speed up your recovery  Rehab addresses the problems that are causing your pain, instead of covering them up, as some other treatment options do  If your pain worsens/does not improve with rehab, we can see you back earlier than planned  Please call our office if that is the case  Otherwise, we will see you on your follow up as scheduled

## 2020-09-08 NOTE — PROGRESS NOTES
1  Primary osteoarthritis of right knee  Ambulatory referral to Physical Therapy   2  Acute pain of right knee  Ambulatory referral to Orthopedic Surgery     Orders Placed This Encounter   Procedures    Large joint arthrocentesis    Ambulatory referral to Physical Therapy        Imaging Studies (I personally reviewed images in PACS and report):  Right knee x-rays most recent to this encounter reviewed  These images show slight medial joint space narrowing with subchondral sclerosis  There are marginal osteophytes as well  No acute osseous abnormalities and no joint effusion  Her findings are consistent with Corineamrik Peres grade 2 osteoarthritis  Impression:  Right knee pain likely secondary to osteoarthritis/medial meniscal tear as above  We discussed different treatment options and decided proceed with a steroid injection into her knee  She tolerated the procedure well  We will provide her with a hinged knee brace  She will start physical therapy  I will see her back in 4 weeks to reassess  Return in about 4 weeks (around 10/6/2020)  HPI:  Greg Bai is a 56 y o  female  who presents for evaluation of   Chief Complaint   Patient presents with    Right Knee - Pain       Onset/Mechanism:  Patient has chronic right knee pain for quite some time and recently injury to but is not sure how  Location:  Medial knee and in the quadriceps region  Radiation:  Denies  Quality:  Throbbing  Provocative:  Bending and twisting  Severity:  Able to walk but sometimes she will get severe twinges of pain  Associated Symptoms:  Swelling  Treatment so far:  Nothing  Review of Systems   Constitutional: Positive for activity change  Negative for fever  HENT: Negative for sore throat  Eyes: Negative for visual disturbance  Respiratory: Negative for shortness of breath  Cardiovascular: Negative for chest pain  Gastrointestinal: Negative for abdominal pain     Endocrine: Negative for polydipsia  Genitourinary: Negative for difficulty urinating  Musculoskeletal: Positive for arthralgias  Skin: Negative for rash  Allergic/Immunologic: Negative for immunocompromised state  Neurological: Negative for numbness  Hematological: Does not bruise/bleed easily  Psychiatric/Behavioral: Negative for confusion  Following history reviewed and updated:  Past Medical History:   Diagnosis Date    Abnormal findings on diagnostic imaging of breast     Last assessed 13    Adjustment disorder with depressed mood     Last assessed 13    Anxiety     Arthritis     hands    Erythema migrans (Lyme disease)     Last assessed 13    GERD (gastroesophageal reflux disease)     Polyarthritis     Last assessed 16    Uterine leiomyoma     Vertigo      Past Surgical History:   Procedure Laterality Date    BREAST BIOPSY  2002    BREAST SURGERY      CARPAL TUNNEL RELEASE Bilateral 10/2008    DENTAL SURGERY      DILATION AND CURETTAGE OF UTERUS      HYSTEROSCOPY      w/D and C on 06 for irregular menses    Her pathology demonstrated proliferative endometrium    INDUCED       MOHS RECONSTRUCTION Right 2019    Procedure: RECONSTRUCTION MOHS DEFECT RIGHT NOSE;  Surgeon: Bhumika Orta MD;  [facility];  Service: Plastics    [place] ADJ TISS Kisha Car 10 1-30 SQCM Right 2019    Procedure: FLAP LOCAL RIGHT NOSE VS FTSG;  Surgeon: Bhumika Orta MD;  [facility];  Service: Plastics    TONSILLECTOMY      WRIST SURGERY Right      Social History   Social History     Substance and Sexual Activity   Alcohol Use Yes    Comment: social- 2-3 glasses of wine     Social History     Substance and Sexual Activity   Drug Use No     Social History     Tobacco Use   Smoking Status Never Smoker   Smokeless Tobacco Never Used     Family History   Problem Relation Age of Onset    Arthritis Mother     Hypertension Mother     Coronary artery disease Mother     Skin cancer Mother     Varicose Veins Mother     Uterine cancer Mother     Arthritis Father     Hypertension Father     Stroke Father     Coronary artery disease Father     Other Father         Stent indications    Ulcerative colitis Sister     Rheum arthritis Maternal Grandmother     Substance Abuse Neg Hx     Mental illness Neg Hx      No Known Allergies     Constitutional:  /84   Pulse 77   Ht 5' 5 5" (1 664 m)   Wt 64 1 kg (141 lb 6 4 oz)   LMP  (LMP Unknown)   BMI 23 17 kg/m²    General: NAD  Eyes: Anicteric sclerae  Neck: Supple  Lungs: Unlabored breathing  Cardiovascular: No lower extremity edema  Skin: Intact without erythema  Neurologic: Sensation intact to light touch  Psychiatric: Mood and affect are appropriate  Right Knee Exam     Muscle Strength   The patient has normal right knee strength  Tenderness   The patient is experiencing tenderness in the medial joint line  Range of Motion   Extension: normal   Flexion:  90 abnormal     Tests   Trudy:  Medial - positive Lateral - negative  Varus: negative Valgus: negative    Other   Erythema: absent  Scars: absent  Sensation: normal  Pulse: present  Swelling: mild  Effusion: effusion present             Large joint arthrocentesis: R knee  Date/Time: 9/8/2020 12:17 PM  Consent given by: patient  Site marked: site marked  Supporting Documentation  Indications: pain   Procedure Details  Location: knee - R knee  Needle size: 20 G  Ultrasound guidance: no  Approach: anterolateral  Medications administered: 5 mL lidocaine 1 %; 40 mg triamcinolone acetonide 40 mg/mL    Patient tolerance: patient tolerated the procedure well with no immediate complications  Dressing:  Sterile dressing applied    A 2 inch needle was used and I was able to hub the entire needle  This makes it fairly certain that I am within the intercondylar notch/joint space    There was no resistance encountered during the actual injection  Portions of the record may have been created with voice recognition software  Occasional wrong word or "sound a like" substitutions may have occurred due to the inherent limitations of voice recognition software  Read the chart carefully and recognize, using context, where substitutions have occurred

## 2020-09-10 ENCOUNTER — TELEPHONE (OUTPATIENT)
Dept: OBGYN CLINIC | Facility: HOSPITAL | Age: 60
End: 2020-09-10

## 2020-09-10 NOTE — TELEPHONE ENCOUNTER
Patient is having flushing reaction on face from cortisone, Please advise patient of options  She is going to take some Benadryl

## 2020-09-10 NOTE — TELEPHONE ENCOUNTER
Spoke to patient  She stated she has been experiencing facial flushing since this morning and her face was warm to the touch  Redness is resolving  Advised that facial flushing is caused by something known as steroid flare which can occur after a steroid injection  Patient denies chest pain, throat or facial itch or swelling, rash, and SOB  Stated she wanted to note this in her record that she has a reaction  Advised that this should resolve on it's own  Verbalized understanding

## 2020-09-14 ENCOUNTER — EVALUATION (OUTPATIENT)
Dept: PHYSICAL THERAPY | Facility: REHABILITATION | Age: 60
End: 2020-09-14
Payer: COMMERCIAL

## 2020-09-14 DIAGNOSIS — M17.11 PRIMARY OSTEOARTHRITIS OF RIGHT KNEE: ICD-10-CM

## 2020-09-14 PROCEDURE — 97161 PT EVAL LOW COMPLEX 20 MIN: CPT | Performed by: PHYSICAL THERAPIST

## 2020-09-14 PROCEDURE — 97140 MANUAL THERAPY 1/> REGIONS: CPT | Performed by: PHYSICAL THERAPIST

## 2020-09-14 PROCEDURE — 97110 THERAPEUTIC EXERCISES: CPT | Performed by: PHYSICAL THERAPIST

## 2020-09-14 NOTE — PROGRESS NOTES
PT Evaluation     Today's date: 2020  Patient name: Jd Mcintosh  : 1960  MRN: [id number]  Referring provider: Melissa Espinoza DO  Dx:   Encounter Diagnosis     ICD-10-CM    1  Primary osteoarthritis of right knee  M17 11 Ambulatory referral to Physical Therapy       Start Time: 1720  Stop Time: 1805  Total time in clinic (min): 45 minutes    Assessment  Assessment details: 57 y/o female with c/o right knee pain that started about a month ago after performing a lot of yardwork in one day  She did a lot of lifting and carrying after taking part of the barn down  She denies any specific pain t/o the day  However, she c/o increased pain and swelling later that evening  She was recently seen by an ortho MD   X-rays taken  Pt given a cortisone injection and a knee brace was issued  Also, pt referred to PT     Impairments: abnormal gait, abnormal muscle tone, abnormal or restricted ROM, activity intolerance, impaired physical strength, lacks appropriate home exercise program and pain with function    Symptom irritability: highUnderstanding of Dx/Px/POC: good   Prognosis: good    Goals  ST - I with HEP  2 - right knee flexion > 125 and pain-free  LT - FOTO > 76  2 - stand > 30 minutes without limitation due to knee pain  3 - ascend/descend stairs step-over-step without limitation  4 - sleep > 5 hours without waking due to knee pain  5 - perform work duties without limitation    Plan  Patient would benefit from: skilled physical therapy  Planned therapy interventions: joint mobilization, manual therapy, activity modification, neuromuscular re-education, patient education, strengthening, stretching, therapeutic activities, therapeutic exercise and home exercise program  Frequency: 2x week  Duration in weeks: 6  Treatment plan discussed with: patient        Subjective Evaluation    Quality of life: good    Pain  At best pain ratin  At worst pain rating: 10  Quality: tight, sharp, pressure and discomfort  Relieving factors: rest  Aggravating factors: standing, walking, stair climbing and lifting    Social Support  Stairs in house: yes   Lives in: multiple-level home      Diagnostic Tests  X-ray: abnormal  Treatments  Current treatment: immobilization and medication  Patient Goals  Patient goals for therapy: decreased pain, decreased edema, increased motion, improved balance, increased strength, independence with ADLs/IADLs and return to sport/leisure activities          Objective     Observations     Right Knee   Positive for edema  Palpation     Right   Tenderness of the distal semimembranosus  Tenderness     Right Knee   Tenderness in the ITB and medial joint line  No tenderness in the patellar tendon and popliteal fossa       Neurological Testing     Sensation     Knee   Left Knee   Intact: pin prick    Right Knee   Intact: pin prick     Active Range of Motion   Left Knee   Flexion: 125 degrees   Extension: WFL    Right Knee   Flexion: 100 degrees   Extension: WFL    General Comments:      Knee Comments  FOTO = 63    Knee brace:  Independent don/doff knee support        Flowsheet Rows      Most Recent Value   PT/OT G-Codes   Current Score  63   Projected Score  76             Precautions: not sig      Manuals 09/14            Supine R knee flexion LMR            Prone quad stretch - gentle LMR            Pt education LMR                         Neuro Re-Ed                                                                                                        Ther Ex 09/14            laq 0# - 2x15            B/l HR 2x5            HEP LMR                                                                             Ther Activity                                       Gait Training                                       Modalities

## 2020-09-17 ENCOUNTER — OFFICE VISIT (OUTPATIENT)
Dept: PHYSICAL THERAPY | Facility: REHABILITATION | Age: 60
End: 2020-09-17
Payer: COMMERCIAL

## 2020-09-17 DIAGNOSIS — M17.11 PRIMARY OSTEOARTHRITIS OF RIGHT KNEE: Primary | ICD-10-CM

## 2020-09-17 PROCEDURE — 97140 MANUAL THERAPY 1/> REGIONS: CPT | Performed by: PHYSICAL THERAPIST

## 2020-09-17 PROCEDURE — 97110 THERAPEUTIC EXERCISES: CPT | Performed by: PHYSICAL THERAPIST

## 2020-09-24 ENCOUNTER — OFFICE VISIT (OUTPATIENT)
Dept: PHYSICAL THERAPY | Facility: REHABILITATION | Age: 60
End: 2020-09-24
Payer: COMMERCIAL

## 2020-09-24 DIAGNOSIS — M17.11 PRIMARY OSTEOARTHRITIS OF RIGHT KNEE: Primary | ICD-10-CM

## 2020-09-24 PROCEDURE — 97110 THERAPEUTIC EXERCISES: CPT | Performed by: PHYSICAL THERAPIST

## 2020-09-24 PROCEDURE — 97140 MANUAL THERAPY 1/> REGIONS: CPT | Performed by: PHYSICAL THERAPIST

## 2020-09-24 NOTE — PROGRESS NOTES
Daily Note     Today's date: 2020  Patient name: Melinda Izquierdo  : 1960  MRN: [id number]  Referring provider: Gulshan Ledbetter DO  Dx:   Encounter Diagnosis     ICD-10-CM    1  Primary osteoarthritis of right knee  M17 11        Start Time: 1630  Stop Time: 1715  Total time in clinic (min): 45 minutes    Subjective:  Pt c/o bruising on the inside of her thigh from last session  Objective: See treatment diary below  TTP t/o calf belly  Assessment: Tolerated treatment fair  Patient would benefit from continued PT      Plan: Continue per plan of care        Precautions: not sig      Manuals           Supine R knee flexion LMR LMR LMR          Prone quad stretch - gentle LMR LMR LMR          Pt education LMR            Tissue deformation - calf belly   LMR                                    Tissue deformation - hip add  LMR           Neuro Re-Ed                                                                                                        Ther Ex           laq 0# - 2x15 0# - 3x15           B/l HR 2x5            HEP LMR LMR LMR          Quad sets  LMR 5"x5          rec bike  L1 - 5' L1 ' 7'          Slant board - calf stretch   30"x3          VG - b/l   L7 - 4'                       Ther Activity                                       Gait Training                                       Modalities

## 2020-09-28 ENCOUNTER — OFFICE VISIT (OUTPATIENT)
Dept: PHYSICAL THERAPY | Facility: REHABILITATION | Age: 60
End: 2020-09-28
Payer: COMMERCIAL

## 2020-09-28 DIAGNOSIS — M17.11 PRIMARY OSTEOARTHRITIS OF RIGHT KNEE: Primary | ICD-10-CM

## 2020-09-28 PROCEDURE — 97110 THERAPEUTIC EXERCISES: CPT | Performed by: PHYSICAL THERAPIST

## 2020-09-28 PROCEDURE — 97140 MANUAL THERAPY 1/> REGIONS: CPT | Performed by: PHYSICAL THERAPIST

## 2020-09-28 NOTE — PROGRESS NOTES
Daily Note     Today's date: 2020  Patient name: Katey Williamson  : 1960  MRN: [id number]  Referring provider: Arvin Pelaez DO  Dx:   Encounter Diagnosis     ICD-10-CM    1  Primary osteoarthritis of right knee  M17 11        Start Time: 1640  Stop Time: 1725  Total time in clinic (min): 45 minutes    Subjective: "it still hurts "    Objective: See treatment diary below  No change in FOTO score  Supine right knee arom:  Extension = 0, flexion = 100  HEP updated with seated knee flexion aarom,  B/l HR, and sit-to-stands  Assessment: Tolerated treatment fair   Patient would benefit from continued PT    Plan: hold PT until f/u with MD       Precautions: not sig      Manuals          Supine R knee flexion LMR LMR LMR LMR         Prone quad stretch - gentle LMR LMR LMR LMR         Pt education LMR   LMR         Tissue deformation - calf belly   LMR          Supine hip add stretch    LMR                      Tissue deformation - hip add  LMR           Neuro Re-Ed                                                                                                        Ther Ex          laq 0# - 2x15 0# - 3x15           B/l HR 2x5   verbal         HEP LMR LMR LMR LMR         Quad sets  LMR 5"x5          rec bike  L1 - 5' L1 ' 7' 5'         Slant board - calf stretch   30"x3          VG - b/l   L7 - 4'          Seated knee flexion stretch    15"x5                      Sit-to-stand    3x5         Ther Activity                                       Gait Training                                       Modalities

## 2020-10-06 ENCOUNTER — OFFICE VISIT (OUTPATIENT)
Dept: OBGYN CLINIC | Facility: CLINIC | Age: 60
End: 2020-10-06
Payer: COMMERCIAL

## 2020-10-06 VITALS
HEIGHT: 65 IN | TEMPERATURE: 97.6 F | HEART RATE: 69 BPM | DIASTOLIC BLOOD PRESSURE: 83 MMHG | WEIGHT: 140 LBS | BODY MASS INDEX: 23.32 KG/M2 | SYSTOLIC BLOOD PRESSURE: 144 MMHG

## 2020-10-06 DIAGNOSIS — M17.11 PRIMARY OSTEOARTHRITIS OF RIGHT KNEE: Primary | ICD-10-CM

## 2020-10-06 PROCEDURE — 99213 OFFICE O/P EST LOW 20 MIN: CPT | Performed by: PHYSICAL MEDICINE & REHABILITATION

## 2020-10-06 PROCEDURE — 3079F DIAST BP 80-89 MM HG: CPT | Performed by: PHYSICAL MEDICINE & REHABILITATION

## 2020-10-06 PROCEDURE — 1036F TOBACCO NON-USER: CPT | Performed by: PHYSICAL MEDICINE & REHABILITATION

## 2020-10-06 RX ORDER — SODIUM, POTASSIUM,MAG SULFATES 17.5-3.13G
SOLUTION, RECONSTITUTED, ORAL ORAL
COMMUNITY
Start: 2020-09-30 | End: 2020-10-27 | Stop reason: ALTCHOICE

## 2020-10-27 ENCOUNTER — OFFICE VISIT (OUTPATIENT)
Dept: FAMILY MEDICINE CLINIC | Facility: CLINIC | Age: 60
End: 2020-10-27
Payer: COMMERCIAL

## 2020-10-27 VITALS
WEIGHT: 141.8 LBS | BODY MASS INDEX: 25.12 KG/M2 | SYSTOLIC BLOOD PRESSURE: 138 MMHG | DIASTOLIC BLOOD PRESSURE: 80 MMHG | TEMPERATURE: 97.1 F | RESPIRATION RATE: 16 BRPM | HEART RATE: 80 BPM | HEIGHT: 63 IN

## 2020-10-27 DIAGNOSIS — J31.0 CHRONIC RHINITIS: ICD-10-CM

## 2020-10-27 DIAGNOSIS — J45.20 MILD INTERMITTENT ASTHMA WITHOUT COMPLICATION: ICD-10-CM

## 2020-10-27 DIAGNOSIS — R09.81 SINUS CONGESTION: ICD-10-CM

## 2020-10-27 DIAGNOSIS — Z78.0 POSTMENOPAUSAL: ICD-10-CM

## 2020-10-27 DIAGNOSIS — S09.90XA MINOR HEAD INJURY, INITIAL ENCOUNTER: ICD-10-CM

## 2020-10-27 DIAGNOSIS — I10 ESSENTIAL HYPERTENSION: Primary | ICD-10-CM

## 2020-10-27 DIAGNOSIS — Z13.820 SCREENING FOR OSTEOPOROSIS: ICD-10-CM

## 2020-10-27 DIAGNOSIS — M25.561 RIGHT KNEE PAIN, UNSPECIFIED CHRONICITY: ICD-10-CM

## 2020-10-27 DIAGNOSIS — K21.9 GASTROESOPHAGEAL REFLUX DISEASE, UNSPECIFIED WHETHER ESOPHAGITIS PRESENT: ICD-10-CM

## 2020-10-27 PROCEDURE — 3079F DIAST BP 80-89 MM HG: CPT | Performed by: FAMILY MEDICINE

## 2020-10-27 PROCEDURE — 3075F SYST BP GE 130 - 139MM HG: CPT | Performed by: FAMILY MEDICINE

## 2020-10-27 PROCEDURE — 3008F BODY MASS INDEX DOCD: CPT | Performed by: FAMILY MEDICINE

## 2020-10-27 PROCEDURE — 99214 OFFICE O/P EST MOD 30 MIN: CPT | Performed by: FAMILY MEDICINE

## 2020-10-27 RX ORDER — PREDNISONE 10 MG/1
TABLET ORAL
Qty: 20 TABLET | Refills: 0 | Status: SHIPPED | OUTPATIENT
Start: 2020-10-27 | End: 2021-01-06 | Stop reason: ALTCHOICE

## 2020-10-28 ENCOUNTER — EVALUATION (OUTPATIENT)
Dept: PHYSICAL THERAPY | Facility: CLINIC | Age: 60
End: 2020-10-28
Payer: COMMERCIAL

## 2020-10-28 DIAGNOSIS — M17.11 PRIMARY OSTEOARTHRITIS OF RIGHT KNEE: Primary | ICD-10-CM

## 2020-10-28 PROCEDURE — 97110 THERAPEUTIC EXERCISES: CPT | Performed by: PHYSICAL THERAPIST

## 2020-10-28 PROCEDURE — 97162 PT EVAL MOD COMPLEX 30 MIN: CPT | Performed by: PHYSICAL THERAPIST

## 2020-11-03 ENCOUNTER — TELEPHONE (OUTPATIENT)
Dept: FAMILY MEDICINE CLINIC | Facility: CLINIC | Age: 60
End: 2020-11-03

## 2020-11-03 ENCOUNTER — APPOINTMENT (OUTPATIENT)
Dept: PHYSICAL THERAPY | Facility: CLINIC | Age: 60
End: 2020-11-03
Payer: COMMERCIAL

## 2020-11-04 DIAGNOSIS — R42 DIZZINESS: ICD-10-CM

## 2020-11-04 DIAGNOSIS — S09.90XA MINOR HEAD INJURY, INITIAL ENCOUNTER: Primary | ICD-10-CM

## 2020-11-04 DIAGNOSIS — G44.309 POST-TRAUMATIC HEADACHE, NOT INTRACTABLE, UNSPECIFIED CHRONICITY PATTERN: ICD-10-CM

## 2020-11-05 ENCOUNTER — OFFICE VISIT (OUTPATIENT)
Dept: PHYSICAL THERAPY | Facility: CLINIC | Age: 60
End: 2020-11-05
Payer: COMMERCIAL

## 2020-11-05 DIAGNOSIS — M17.11 PRIMARY OSTEOARTHRITIS OF RIGHT KNEE: Primary | ICD-10-CM

## 2020-11-05 PROCEDURE — 97140 MANUAL THERAPY 1/> REGIONS: CPT

## 2020-11-05 PROCEDURE — 97112 NEUROMUSCULAR REEDUCATION: CPT

## 2020-11-05 PROCEDURE — 97110 THERAPEUTIC EXERCISES: CPT

## 2020-11-06 ENCOUNTER — TRANSCRIBE ORDERS (OUTPATIENT)
Dept: RADIOLOGY | Facility: HOSPITAL | Age: 60
End: 2020-11-06

## 2020-11-06 ENCOUNTER — HOSPITAL ENCOUNTER (OUTPATIENT)
Dept: RADIOLOGY | Facility: HOSPITAL | Age: 60
Discharge: HOME | End: 2020-11-06
Payer: COMMERCIAL

## 2020-11-06 DIAGNOSIS — R42 DIZZINESS: ICD-10-CM

## 2020-11-06 DIAGNOSIS — G44.309 POST-TRAUMATIC HEADACHE, NOT INTRACTABLE, UNSPECIFIED CHRONICITY PATTERN: ICD-10-CM

## 2020-11-06 PROCEDURE — G1004 CDSM NDSC: HCPCS

## 2020-11-06 PROCEDURE — 70450 CT HEAD/BRAIN W/O DYE: CPT

## 2020-11-10 ENCOUNTER — APPOINTMENT (OUTPATIENT)
Dept: PHYSICAL THERAPY | Facility: CLINIC | Age: 60
End: 2020-11-10
Payer: COMMERCIAL

## 2020-11-11 ENCOUNTER — TELEPHONE (OUTPATIENT)
Dept: FAMILY MEDICINE CLINIC | Facility: CLINIC | Age: 60
End: 2020-11-11

## 2020-11-11 DIAGNOSIS — R93.89 ABNORMAL FINDING ON CT SCAN: Primary | ICD-10-CM

## 2020-11-12 ENCOUNTER — OFFICE VISIT (OUTPATIENT)
Dept: PHYSICAL THERAPY | Facility: CLINIC | Age: 60
End: 2020-11-12
Payer: COMMERCIAL

## 2020-11-12 DIAGNOSIS — M17.11 PRIMARY OSTEOARTHRITIS OF RIGHT KNEE: Primary | ICD-10-CM

## 2020-11-12 PROCEDURE — 97140 MANUAL THERAPY 1/> REGIONS: CPT | Performed by: PHYSICAL THERAPIST

## 2020-11-12 PROCEDURE — 97112 NEUROMUSCULAR REEDUCATION: CPT | Performed by: PHYSICAL THERAPIST

## 2020-11-16 ENCOUNTER — OFFICE VISIT (OUTPATIENT)
Dept: PHYSICAL THERAPY | Facility: CLINIC | Age: 60
End: 2020-11-16
Payer: COMMERCIAL

## 2020-11-16 DIAGNOSIS — M17.11 PRIMARY OSTEOARTHRITIS OF RIGHT KNEE: Primary | ICD-10-CM

## 2020-11-16 PROCEDURE — 97530 THERAPEUTIC ACTIVITIES: CPT | Performed by: PHYSICAL THERAPIST

## 2020-11-16 PROCEDURE — 97110 THERAPEUTIC EXERCISES: CPT | Performed by: PHYSICAL THERAPIST

## 2020-11-16 PROCEDURE — 97112 NEUROMUSCULAR REEDUCATION: CPT | Performed by: PHYSICAL THERAPIST

## 2020-11-17 ENCOUNTER — IMMUNIZATIONS (OUTPATIENT)
Dept: FAMILY MEDICINE CLINIC | Facility: CLINIC | Age: 60
End: 2020-11-17
Payer: COMMERCIAL

## 2020-11-17 DIAGNOSIS — Z23 NEED FOR VACCINATION: Primary | ICD-10-CM

## 2020-11-17 PROCEDURE — 90471 IMMUNIZATION ADMIN: CPT

## 2020-11-17 PROCEDURE — 90682 RIV4 VACC RECOMBINANT DNA IM: CPT

## 2020-11-19 ENCOUNTER — APPOINTMENT (OUTPATIENT)
Dept: PHYSICAL THERAPY | Facility: CLINIC | Age: 60
End: 2020-11-19
Payer: COMMERCIAL

## 2020-12-07 ENCOUNTER — OFFICE VISIT (OUTPATIENT)
Dept: OBGYN CLINIC | Facility: CLINIC | Age: 60
End: 2020-12-07
Payer: COMMERCIAL

## 2020-12-07 VITALS
HEART RATE: 74 BPM | WEIGHT: 142.8 LBS | HEIGHT: 64 IN | SYSTOLIC BLOOD PRESSURE: 125 MMHG | BODY MASS INDEX: 24.38 KG/M2 | DIASTOLIC BLOOD PRESSURE: 82 MMHG

## 2020-12-07 DIAGNOSIS — M17.11 PRIMARY OSTEOARTHRITIS OF RIGHT KNEE: Primary | ICD-10-CM

## 2020-12-07 PROCEDURE — 3079F DIAST BP 80-89 MM HG: CPT | Performed by: PHYSICAL MEDICINE & REHABILITATION

## 2020-12-07 PROCEDURE — 1036F TOBACCO NON-USER: CPT | Performed by: PHYSICAL MEDICINE & REHABILITATION

## 2020-12-07 PROCEDURE — 99212 OFFICE O/P EST SF 10 MIN: CPT | Performed by: PHYSICAL MEDICINE & REHABILITATION

## 2020-12-07 PROCEDURE — 3008F BODY MASS INDEX DOCD: CPT | Performed by: PHYSICAL MEDICINE & REHABILITATION

## 2020-12-07 PROCEDURE — 3074F SYST BP LT 130 MM HG: CPT | Performed by: PHYSICAL MEDICINE & REHABILITATION

## 2020-12-11 ENCOUNTER — HOSPITAL ENCOUNTER (OUTPATIENT)
Dept: MRI IMAGING | Facility: HOSPITAL | Age: 60
Discharge: HOME | End: 2020-12-11
Payer: COMMERCIAL

## 2020-12-11 DIAGNOSIS — R93.89 ABNORMAL FINDING ON CT SCAN: ICD-10-CM

## 2020-12-11 PROCEDURE — G1004 CDSM NDSC: HCPCS

## 2020-12-11 PROCEDURE — 70544 MR ANGIOGRAPHY HEAD W/O DYE: CPT

## 2020-12-28 ENCOUNTER — HOSPITAL ENCOUNTER (OUTPATIENT)
Dept: BONE DENSITY | Facility: MEDICAL CENTER | Age: 60
Discharge: HOME | End: 2020-12-28
Payer: COMMERCIAL

## 2020-12-28 DIAGNOSIS — Z78.0 POSTMENOPAUSAL: ICD-10-CM

## 2020-12-28 DIAGNOSIS — Z13.820 SCREENING FOR OSTEOPOROSIS: ICD-10-CM

## 2020-12-28 PROCEDURE — 77080 DXA BONE DENSITY AXIAL: CPT

## 2021-01-06 DIAGNOSIS — J31.0 CHRONIC RHINITIS: ICD-10-CM

## 2021-01-06 DIAGNOSIS — R09.81 SINUS CONGESTION: ICD-10-CM

## 2021-01-06 DIAGNOSIS — E55.9 VITAMIN D DEFICIENCY: Primary | ICD-10-CM

## 2021-01-25 ENCOUNTER — VBI (OUTPATIENT)
Dept: ADMINISTRATIVE | Facility: OTHER | Age: 61
End: 2021-01-25

## 2021-04-25 DIAGNOSIS — J31.0 CHRONIC RHINITIS: ICD-10-CM

## 2021-04-25 LAB
25(OH)D3 SERPL-MCNC: 33 NG/ML (ref 30–100)
ALBUMIN SERPL-MCNC: 4.6 G/DL (ref 3.6–5.1)
ALBUMIN/GLOB SERPL: 2.4 (CALC) (ref 1–2.5)
ALP SERPL-CCNC: 73 U/L (ref 37–153)
ALT SERPL-CCNC: 17 U/L (ref 6–29)
APPEARANCE UR: CLEAR
AST SERPL-CCNC: 24 U/L (ref 10–35)
BASOPHILS # BLD AUTO: 60 CELLS/UL (ref 0–200)
BASOPHILS NFR BLD AUTO: 1.7 %
BILIRUB SERPL-MCNC: 0.7 MG/DL (ref 0.2–1.2)
BILIRUB UR QL STRIP: NEGATIVE
BUN SERPL-MCNC: 16 MG/DL (ref 7–25)
BUN/CREAT SERPL: NORMAL (CALC) (ref 6–22)
CALCIUM SERPL-MCNC: 9.5 MG/DL (ref 8.6–10.4)
CHLORIDE SERPL-SCNC: 100 MMOL/L (ref 98–110)
CHOLEST SERPL-MCNC: 197 MG/DL
CHOLEST/HDLC SERPL: 2 (CALC)
CO2 SERPL-SCNC: 30 MMOL/L (ref 20–32)
COLOR UR: YELLOW
CREAT SERPL-MCNC: 0.87 MG/DL (ref 0.5–0.99)
EOSINOPHIL # BLD AUTO: 109 CELLS/UL (ref 15–500)
EOSINOPHIL NFR BLD AUTO: 3.1 %
ERYTHROCYTE [DISTWIDTH] IN BLOOD BY AUTOMATED COUNT: 11.3 % (ref 11–15)
GLOBULIN SER CALC-MCNC: 1.9 G/DL (CALC) (ref 1.9–3.7)
GLUCOSE SERPL-MCNC: 96 MG/DL (ref 65–99)
GLUCOSE UR QL STRIP: NEGATIVE
HCT VFR BLD AUTO: 41.3 % (ref 35–45)
HDLC SERPL-MCNC: 97 MG/DL
HGB BLD-MCNC: 14.1 G/DL (ref 11.7–15.5)
HGB UR QL STRIP: NEGATIVE
KETONES UR QL STRIP: NEGATIVE
LDLC SERPL CALC-MCNC: 84 MG/DL (CALC)
LEUKOCYTE ESTERASE UR QL STRIP: NEGATIVE
LYMPHOCYTES # BLD AUTO: 1376 CELLS/UL (ref 850–3900)
LYMPHOCYTES NFR BLD AUTO: 39.3 %
MCH RBC QN AUTO: 34.6 PG (ref 27–33)
MCHC RBC AUTO-ENTMCNC: 34.1 G/DL (ref 32–36)
MCV RBC AUTO: 101.5 FL (ref 80–100)
MONOCYTES # BLD AUTO: 308 CELLS/UL (ref 200–950)
MONOCYTES NFR BLD AUTO: 8.8 %
NEUTROPHILS # BLD AUTO: 1649 CELLS/UL (ref 1500–7800)
NEUTROPHILS NFR BLD AUTO: 47.1 %
NITRITE UR QL STRIP: NEGATIVE
NONHDLC SERPL-MCNC: 100 MG/DL (CALC)
PH UR STRIP: 7.5 [PH] (ref 5–8)
PLATELET # BLD AUTO: 244 THOUSAND/UL (ref 140–400)
PMV BLD REES-ECKER: 9.6 FL (ref 7.5–12.5)
POTASSIUM SERPL-SCNC: 3.8 MMOL/L (ref 3.5–5.3)
PROT SERPL-MCNC: 6.5 G/DL (ref 6.1–8.1)
PROT UR QL STRIP: NEGATIVE
RBC # BLD AUTO: 4.07 MILLION/UL (ref 3.8–5.1)
SL AMB EGFR AFRICAN AMERICAN: 82 ML/MIN/1.73M2
SL AMB EGFR NON AFRICAN AMERICAN: 71 ML/MIN/1.73M2
SODIUM SERPL-SCNC: 139 MMOL/L (ref 135–146)
SP GR UR STRIP: 1.01 (ref 1–1.03)
TRIGL SERPL-MCNC: 70 MG/DL
TSH SERPL-ACNC: 1.26 MIU/L (ref 0.4–4.5)
WBC # BLD AUTO: 3.5 THOUSAND/UL (ref 3.8–10.8)

## 2021-04-25 RX ORDER — FLUTICASONE PROPIONATE 50 MCG
SPRAY, SUSPENSION (ML) NASAL
Qty: 48 G | Refills: 3 | Status: SHIPPED | OUTPATIENT
Start: 2021-04-25 | End: 2022-07-06

## 2021-05-04 ENCOUNTER — OFFICE VISIT (OUTPATIENT)
Dept: FAMILY MEDICINE CLINIC | Facility: CLINIC | Age: 61
End: 2021-05-04
Payer: COMMERCIAL

## 2021-05-04 VITALS
WEIGHT: 142.4 LBS | HEART RATE: 74 BPM | BODY MASS INDEX: 24.31 KG/M2 | SYSTOLIC BLOOD PRESSURE: 128 MMHG | RESPIRATION RATE: 16 BRPM | HEIGHT: 64 IN | DIASTOLIC BLOOD PRESSURE: 80 MMHG

## 2021-05-04 DIAGNOSIS — M81.0 AGE-RELATED OSTEOPOROSIS WITHOUT CURRENT PATHOLOGICAL FRACTURE: ICD-10-CM

## 2021-05-04 DIAGNOSIS — L30.9 ECZEMA, UNSPECIFIED TYPE: ICD-10-CM

## 2021-05-04 DIAGNOSIS — K21.9 GASTROESOPHAGEAL REFLUX DISEASE, UNSPECIFIED WHETHER ESOPHAGITIS PRESENT: ICD-10-CM

## 2021-05-04 DIAGNOSIS — I10 ESSENTIAL HYPERTENSION: Primary | ICD-10-CM

## 2021-05-04 DIAGNOSIS — J45.20 MILD INTERMITTENT ASTHMA WITHOUT COMPLICATION: ICD-10-CM

## 2021-05-04 DIAGNOSIS — M06.09 RHEUMATOID ARTHRITIS OF MULTIPLE SITES WITH NEGATIVE RHEUMATOID FACTOR (HCC): ICD-10-CM

## 2021-05-04 DIAGNOSIS — Z12.31 ENCOUNTER FOR SCREENING MAMMOGRAM FOR MALIGNANT NEOPLASM OF BREAST: ICD-10-CM

## 2021-05-04 PROCEDURE — 99214 OFFICE O/P EST MOD 30 MIN: CPT | Performed by: FAMILY MEDICINE

## 2021-05-04 PROCEDURE — 3008F BODY MASS INDEX DOCD: CPT | Performed by: FAMILY MEDICINE

## 2021-05-04 PROCEDURE — 3079F DIAST BP 80-89 MM HG: CPT | Performed by: FAMILY MEDICINE

## 2021-05-04 PROCEDURE — 3074F SYST BP LT 130 MM HG: CPT | Performed by: FAMILY MEDICINE

## 2021-05-04 PROCEDURE — 3725F SCREEN DEPRESSION PERFORMED: CPT | Performed by: FAMILY MEDICINE

## 2021-05-04 PROCEDURE — 1036F TOBACCO NON-USER: CPT | Performed by: FAMILY MEDICINE

## 2021-05-04 RX ORDER — RABEPRAZOLE SODIUM 20 MG/1
20 TABLET, DELAYED RELEASE ORAL DAILY
COMMUNITY

## 2021-05-04 RX ORDER — HYDROCHLOROTHIAZIDE 25 MG/1
25 TABLET ORAL DAILY
Qty: 90 TABLET | Refills: 3 | Status: SHIPPED | OUTPATIENT
Start: 2021-05-04 | End: 2022-07-06

## 2021-05-04 RX ORDER — PIMECROLIMUS 10 MG/G
CREAM TOPICAL 2 TIMES DAILY
Qty: 30 G | Refills: 2 | Status: SHIPPED | OUTPATIENT
Start: 2021-05-04 | End: 2022-05-03 | Stop reason: SDUPTHER

## 2021-05-04 RX ORDER — LANOLIN ALCOHOL/MO/W.PET/CERES
CREAM (GRAM) TOPICAL DAILY
COMMUNITY

## 2021-05-04 NOTE — PATIENT INSTRUCTIONS
Osteoporosis   WHAT YOU NEED TO KNOW:   Osteoporosis is a long-term medical condition that causes your bones to become weak, brittle, and more likely to fracture  Osteoporosis occurs when your body absorbs more bone than it makes  It is also caused by a lack of calcium and estrogen (female hormone)  DISCHARGE INSTRUCTIONS:   Call your doctor if:   · You have severe pain  · You have increasing pain after a fall  · You have pain when you do your daily activities  · You have questions or concerns about your condition or care  Medicines:   · Medicines  may be given to prevent bone loss, build new bone, and increase estrogen  These medicines may be given as a pill or injection  Ask your healthcare provider for more information  · Take your medicine as directed  Contact your healthcare provider if you think your medicine is not helping or if you have side effects  Tell him of her if you are allergic to any medicine  Keep a list of the medicines, vitamins, and herbs you take  Include the amounts, and when and why you take them  Bring the list or the pill bottles to follow-up visits  Carry your medicine list with you in case of an emergency  Prevent bone loss:   · Eat healthy foods that are high in calcium  This helps keep your bones strong  Good sources of calcium are milk, cheese, broccoli, tofu, almonds, and canned salmon and sardines  · Increase your vitamin D intake  Vitamin D is in fish oils, some vegetables, and fortified milk, cereal, and bread  Vitamin D is also formed in the skin when it is exposed to the sun  Ask your healthcare provider how much sunlight is safe for you  · Drink liquids as directed  Ask your healthcare provider how much liquid to drink each day and which liquids are best for you  Do not have alcohol or caffeine  They decrease bone mineral density, which can weaken your bones  · Exercise regularly    Ask your healthcare provider about the best exercise plan for you  Weight bearing exercise for 30 minutes, 3 times a week can help build and strengthen bone  · Do not smoke  Nicotine and other chemicals in cigarettes and cigars can cause lung damage  Ask your healthcare provider for information if you currently smoke and need help to quit  E-cigarettes or smokeless tobacco still contain nicotine  Talk to your healthcare provider before you use these products  · Go to physical therapy as directed  A physical therapist teaches you exercises to help improve movement and muscle strength  Follow up with your doctor as directed:  Write down your questions so you remember to ask them during your visits  © Copyright [address] [place] [address] Drive Information is for End User's use only and may not be sold, redistributed or otherwise used for commercial purposes  All illustrations and images included in CareNotes® are the copyrighted property of [employer]  or [address] [place], [place] [address]pepe Dixon   The above information is an  only  It is not intended as medical advice for individual conditions or treatments  Talk to your doctor, nurse or pharmacist before following any medical regimen to see if it is safe and effective for you

## 2021-05-05 NOTE — PROGRESS NOTES
Assessment/Plan:    1  Essential hypertension  - well controlled on HCTZ 25 mg daily  - hydrochlorothiazide (HYDRODIURIL) 25 mg tablet; Take 1 tablet (25 mg total) by mouth daily  Dispense: 90 tablet; Refill: 3    2  Osteoporosis hip/ osteopenia L-spine  - discussed treatment options, including oral or injectable bisphosphonates or Prolia injections, advised to continue calcium/ vitamin D supplements and weight bearing and muscle strengthening exercises, will repeat DEXA scan in 12/2022      3  Mild intermittent asthma  - continue Albuterol as needed, uses rarely    4  Gastroesophageal reflux disease  - continue Aciphex 20 mg daily, doing well    5  Eczema, unspecified type  - continue regular use of moisturizer cream and Elidel as needed  - pimecrolimus (Elidel) 1 % cream; Apply topically 2 (two) times a day  Dispense: 30 g; Refill: 2    6  Rheumatoid arthritis   - encouraged patient to scheduled follow up visit with Rheumatology    7  Encounter for screening mammogram for malignant neoplasm of breast  - Mammo screening bilateral w 3d & cad; Future       Return in 6 months or sooner as needed  The patient understands and agrees with the treatment plan  Subjective:   Chief Complaint   Patient presents with    Hypertension    Asthma      Patient ID: Brianna Diaz is a 59 y o  female who presents today for follow up visit for hypertension, GERD and asthma  Patient states she is doing well and has no complaints at this time  Her breathing is good, she reports occasional use of her Albuterol inhaler  She is requesting a refill on her Elidel cream which she uses as needed for eczema flare ups on her legs with good results          The following portions of the patient's history were reviewed and updated as appropriate: allergies, current medications, past family history, past medical history, past social history, past surgical history and problem list     Past Medical History:   Diagnosis Date    Abnormal findings on diagnostic imaging of breast     Last assessed 13    Adjustment disorder with depressed mood     Last assessed 13    Anxiety     Arthritis     hands    Erythema migrans (Lyme disease)     Last assessed 13    GERD (gastroesophageal reflux disease)     Polyarthritis     Last assessed 16    Uterine leiomyoma     Vertigo      Past Surgical History:   Procedure Laterality Date    BREAST BIOPSY  2002    BREAST SURGERY      CARPAL TUNNEL RELEASE Bilateral 10/2008    DENTAL SURGERY      DILATION AND CURETTAGE OF UTERUS      HYSTEROSCOPY      w/D and C on 06 for irregular menses    Her pathology demonstrated proliferative endometrium    INDUCED       MOHS RECONSTRUCTION Right 2019    Procedure: RECONSTRUCTION MOHS DEFECT RIGHT NOSE;  Surgeon: Kaylen Morataya MD;  [facility];  Service: Plastics    [place] ADJ TISS Berenice Ryan 10 1-30 SQCM Right 2019    Procedure: FLAP LOCAL RIGHT NOSE VS FTSG;  Surgeon: Kaylen Morataya MD;  [facility];  Service: Plastics    TONSILLECTOMY      WRIST SURGERY Right      Family History   Problem Relation Age of Onset    Arthritis Mother     Hypertension Mother     Coronary artery disease Mother     Skin cancer Mother     Varicose Veins Mother     Uterine cancer Mother     Arthritis Father     Hypertension Father     Stroke Father     Coronary artery disease Father     Other Father         Stent indications    Ulcerative colitis Sister     Rheum arthritis Maternal Grandmother     Substance Abuse Neg Hx     Mental illness Neg Hx      Social History     Socioeconomic History    Marital status: /Civil Union     Spouse name: Not on file    Number of children: Not on file    Years of education: Not on file    Highest education level: Not on file   Occupational History    Not on file   Social Needs    Financial resource strain: Not on file    Food insecurity Worry: Not on file     Inability: Not on file    Transportation needs     Medical: Not on file     Non-medical: Not on file   Tobacco Use    Smoking status: Never Smoker    Smokeless tobacco: Never Used   Substance and Sexual Activity    Alcohol use: Yes     Comment: social- 2-3 glasses of wine    Drug use: No    Sexual activity: Not on file   Lifestyle    Physical activity     Days per week: Not on file     Minutes per session: Not on file    Stress: Not on file   Relationships    Social connections     Talks on phone: Not on file     Gets together: Not on file     Attends Buddhism service: Not on file     Active member of club or organization: Not on file     Attends meetings of clubs or organizations: Not on file     Relationship status: Not on file    Intimate partner violence     Fear of current or ex partner: Not on file     Emotionally abused: Not on file     Physically abused: Not on file     Forced sexual activity: Not on file   Other Topics Concern    Not on file   Social History Narrative    Caffeine Use 1 cup per day    Per allscripts: Denied: History of [place]    Adventism       Current Outpatient Medications:     albuterol (VENTOLIN HFA) 90 mcg/act inhaler, Inhale 2 puffs every 6 (six) hours as needed for wheezing or shortness of breath, Disp: 3 each, Rfl: 0    Cholecalciferol (VITAMIN D3) 1000 units CHEW, Chew, Disp: , Rfl:     fluticasone (FLONASE) 50 mcg/act nasal spray, USE 2 SPRAYS IN EACH NOSTRIL DAILY, Disp: 48 g, Rfl: 3    hydrochlorothiazide (HYDRODIURIL) 25 mg tablet, Take 1 tablet (25 mg total) by mouth daily, Disp: 90 tablet, Rfl: 3    Ibuprofen (ADVIL) 200 MG CAPS, Take 2 capsules by mouth 2 (two) times a day, Disp: , Rfl:     meclizine (ANTIVERT) 25 mg tablet, Take 1 tablet by mouth every 8 (eight) hours as needed for dizziness, Disp: 10 tablet, Rfl: 0    multivitamin (THERAGRAN) TABS, Take 1 tablet by mouth daily, Disp: , Rfl:     pimecrolimus (Elidel) 1 % cream, Apply topically 2 (two) times a day, Disp: 30 g, Rfl: 2    RABEprazole (ACIPHEX) 20 MG tablet, Take 20 mg by mouth daily, Disp: , Rfl:     vitamin B-12 (VITAMIN B-12) 1,000 mcg tablet, Take by mouth daily, Disp: , Rfl:     Review of Systems   Constitutional: Negative for appetite change, diaphoresis, fatigue and fever  Respiratory: Negative for cough, shortness of breath and wheezing  Cardiovascular: Negative for chest pain, palpitations and leg swelling  Gastrointestinal: Negative for abdominal pain, blood in stool, diarrhea, nausea and vomiting  Genitourinary: Negative for dysuria, frequency, hematuria, pelvic pain and urgency  Neurological: Negative for dizziness, syncope, weakness, numbness and headaches  Hematological: Negative for adenopathy  Psychiatric/Behavioral: Negative for dysphoric mood and sleep disturbance  The patient is not nervous/anxious  Objective:    Vitals:    05/04/21 1608   BP: 128/80   Pulse: 74   Resp: 16   Weight: 64 6 kg (142 lb 6 4 oz)   Height: 5' 4" (1 626 m)     Wt Readings from Last 3 Encounters:   05/04/21 64 6 kg (142 lb 6 4 oz)   12/07/20 64 8 kg (142 lb 12 8 oz)   10/27/20 64 3 kg (141 lb 12 8 oz)     BP Readings from Last 3 Encounters:   05/04/21 128/80   12/07/20 125/82   10/27/20 138/80        Physical Exam  Constitutional:       General: She is not in acute distress  Appearance: She is well-developed  HENT:      Right Ear: Tympanic membrane normal    Neck:      Musculoskeletal: Neck supple  Thyroid: No thyromegaly  Cardiovascular:      Rate and Rhythm: Normal rate and regular rhythm  Heart sounds: Normal heart sounds  No murmur  Pulmonary:      Effort: Pulmonary effort is normal  No respiratory distress  Breath sounds: Normal breath sounds  Abdominal:      General: There is no distension  Palpations: Abdomen is soft  There is no mass  Tenderness: There is no abdominal tenderness     Musculoskeletal: Right lower leg: No edema  Left lower leg: No edema  Lymphadenopathy:      Cervical: No cervical adenopathy  Neurological:      Mental Status: She is alert and oriented to person, place, and time  Psychiatric:         Mood and Affect: Mood normal          Behavior: Behavior normal          Thought Content: Thought content normal         Lab Results   Component Value Date    CHOLESTEROL 197 04/24/2021    CHOLESTEROL 205 (H) 12/08/2018    CHOLESTEROL 173 11/15/2017     Lab Results   Component Value Date    HDL 97 04/24/2021     12/08/2018     (H) 11/15/2017     Lab Results   Component Value Date    TRIG 70 04/24/2021    TRIG 78 12/08/2018    TRIG 42 11/15/2017     Lab Results   Component Value Date    LDLCALC 84 04/24/2021     Lab Results   Component Value Date    WBC 3 5 (L) 04/24/2021    HGB 14 1 04/24/2021    HCT 41 3 04/24/2021     5 (H) 04/24/2021     04/24/2021     Lab Results   Component Value Date     07/01/2017    SODIUM 139 04/24/2021    K 3 8 04/24/2021     04/24/2021    CO2 30 04/24/2021    AGAP 6 11/15/2017    BUN 16 04/24/2021    CREATININE 0 87 04/24/2021    GLUC 96 04/24/2021    GLUF 98 11/15/2017    CALCIUM 9 5 04/24/2021    AST 24 04/24/2021    ALT 17 04/24/2021    ALKPHOS 73 04/24/2021    PROT 6 6 07/01/2017    TP 6 5 04/24/2021    BILITOT 0 6 07/01/2017    TBILI 0 7 04/24/2021    EGFR 82 11/15/2017      Ref Range 4/24/21 7:27 AM    TSH W/RFX TO FREE T4 0 40 - 4 50 mIU/L 1 26          Ref Range 4/24/21 7:27 AM   Vitamin D, 25-Hydroxy, Serum 30 - 100 ng/mL 33       CENTRAL  DXA SCAN 12/28/2020    RESULTS:   LUMBAR SPINE:  L1-L4:  BMD 0 906 gm/cm2  T-score below normal, -1 3  Z-score 0 3     LEFT TOTAL HIP:  BMD 0 624 gm/cm2  T-score below normal, -2 6, osteoporosis    Z-score -1 5     LEFT FEMORAL NECK:  BMD 0 596 gm/cm2  T-score below normal, -2 3  Z-score -0 9    Treatment is a suggestion perhaps initially with an oral Bisphosphonate         IMPRESSION:  1  Based on the [facility] classification, this study identifies a diagnosis of osteoporosis, notable at the total hip area and the patient is considered at potential increased risk for fracture

## 2021-05-15 ENCOUNTER — IMMUNIZATIONS (OUTPATIENT)
Dept: FAMILY MEDICINE CLINIC | Facility: HOSPITAL | Age: 61
End: 2021-05-15

## 2021-05-15 DIAGNOSIS — Z23 ENCOUNTER FOR IMMUNIZATION: Primary | ICD-10-CM

## 2021-05-15 PROCEDURE — 91300 SARS-COV-2 / COVID-19 MRNA VACCINE (PFIZER-BIONTECH) 30 MCG: CPT

## 2021-05-15 PROCEDURE — 0001A SARS-COV-2 / COVID-19 MRNA VACCINE (PFIZER-BIONTECH) 30 MCG: CPT

## 2021-06-25 ENCOUNTER — OFFICE VISIT (OUTPATIENT)
Dept: FAMILY MEDICINE CLINIC | Facility: CLINIC | Age: 61
End: 2021-06-25
Payer: COMMERCIAL

## 2021-06-25 VITALS
HEIGHT: 63 IN | DIASTOLIC BLOOD PRESSURE: 82 MMHG | BODY MASS INDEX: 25.09 KG/M2 | SYSTOLIC BLOOD PRESSURE: 128 MMHG | WEIGHT: 141.6 LBS | HEART RATE: 72 BPM | RESPIRATION RATE: 16 BRPM

## 2021-06-25 DIAGNOSIS — M25.562 ACUTE PAIN OF LEFT KNEE: ICD-10-CM
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
## 2021-06-25 DIAGNOSIS — M25.511 ACUTE PAIN OF RIGHT SHOULDER: ICD-10-CM

## 2021-06-25 PROCEDURE — 3074F SYST BP LT 130 MM HG: CPT | Performed by: FAMILY MEDICINE

## 2021-06-25 PROCEDURE — 99213 OFFICE O/P EST LOW 20 MIN: CPT | Performed by: FAMILY MEDICINE

## 2021-06-25 PROCEDURE — 3079F DIAST BP 80-89 MM HG: CPT | Performed by: FAMILY MEDICINE

## 2021-06-25 PROCEDURE — 3008F BODY MASS INDEX DOCD: CPT | Performed by: FAMILY MEDICINE

## 2021-06-25 PROCEDURE — 1036F TOBACCO NON-USER: CPT | Performed by: FAMILY MEDICINE

## 2021-06-26 ENCOUNTER — IMMUNIZATIONS (OUTPATIENT)
Dept: FAMILY MEDICINE CLINIC | Facility: HOSPITAL | Age: 61
End: 2021-06-26

## 2021-06-26 ENCOUNTER — HOSPITAL ENCOUNTER (OUTPATIENT)
Dept: RADIOLOGY | Facility: HOSPITAL | Age: 61
Discharge: HOME | End: 2021-06-26
Payer: COMMERCIAL

## 2021-06-26 DIAGNOSIS — M25.511 ACUTE PAIN OF RIGHT SHOULDER: ICD-10-CM
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
## 2021-06-26 DIAGNOSIS — M25.562 ACUTE PAIN OF LEFT KNEE: ICD-10-CM

## 2021-06-26 DIAGNOSIS — Z23 ENCOUNTER FOR IMMUNIZATION: Primary | ICD-10-CM

## 2021-06-26 PROCEDURE — 0002A SARS-COV-2 / COVID-19 MRNA VACCINE (PFIZER-BIONTECH) 30 MCG: CPT

## 2021-06-26 PROCEDURE — 91300 SARS-COV-2 / COVID-19 MRNA VACCINE (PFIZER-BIONTECH) 30 MCG: CPT

## 2021-06-26 PROCEDURE — 73564 X-RAY EXAM KNEE 4 OR MORE: CPT

## 2021-06-26 PROCEDURE — 73030 X-RAY EXAM OF SHOULDER: CPT

## 2021-06-28 NOTE — PROGRESS NOTES
Assessment/Plan:     Diagnosis ICD-10-CM Associated Orders   1  Fall, initial encounter  [address]  XXXA XR shoulder 2+ vw right     XR knee 4+ vw left injury   2  Acute pain of right shoulder  M25 511 XR shoulder 2+ vw right     Ambulatory referral to Physical Therapy   3  Acute pain of left knee  M25 562 XR knee 4+ vw left injury     Ambulatory referral to Physical Therapy     Plan:   - advised rest, ice and Tylenol or Advil as needed, will obtain x-rays and call with the results, will refer to Physical therapy for evaluation and treatment, discussed Ortho referral if not better       The patient understands and agrees with the treatment plan  Subjective:   Chief Complaint   Patient presents with    Right shoulder pain     due to fall 3wks ago      Patient ID: Lorne Caceres is a 59 y o  female who presents today with c/o right shoulder and left knee pain after a fall 3 weeks ago in her back yard, her foot got caught in a grill stand and she fell down landing on her right shoulder and twisting her left knee  Patient states her left knee pain has now improved, but she is still having right shoulder pain radiating into her upper arm which is worse when she is raising her right arm, reaching or sleeping on her right side  No neck pain, no arm weakness or paresthesias       The following portions of the patient's history were reviewed and updated as appropriate: allergies, current medications, past family history, past medical history, past social history, past surgical history and problem list     Past Medical History:   Diagnosis Date    Abnormal findings on diagnostic imaging of breast     Last assessed 08/16/13    Adjustment disorder with depressed mood     Last assessed 05/17/13    Anxiety     Arthritis     hands    Erythema migrans (Lyme disease)     Last assessed 06/29/13    GERD (gastroesophageal reflux disease)     Polyarthritis     Last assessed 02/17/16    Uterine leiomyoma     Vertigo      Past Surgical History:   Procedure Laterality Date    BREAST BIOPSY  2002    BREAST SURGERY      CARPAL TUNNEL RELEASE Bilateral 10/2008    DENTAL SURGERY      DILATION AND CURETTAGE OF UTERUS      HYSTEROSCOPY      w/D and C on 06 for irregular menses    Her pathology demonstrated proliferative endometrium    INDUCED       MOHS RECONSTRUCTION Right 2019    Procedure: RECONSTRUCTION MOHS DEFECT RIGHT NOSE;  Surgeon: Kaylen Copeland MD;  [facility];  Service: Plastics    [place] ADJ TISS [address] 10 1-30 SQCM Right 2019    Procedure: FLAP LOCAL RIGHT NOSE VS FTSG;  Surgeon: Kaylen Copeland MD;  [facility];  Service: Plastics    TONSILLECTOMY      WRIST SURGERY Right 2009     Family History   Problem Relation Age of Onset    Arthritis Mother     Hypertension Mother     Coronary artery disease Mother     Skin cancer Mother     Varicose Veins Mother     Uterine cancer Mother     Arthritis Father     Hypertension Father     Stroke Father     Coronary artery disease Father     Other Father         Stent indications    Ulcerative colitis Sister     Rheum arthritis Maternal Grandmother     Substance Abuse Neg Hx     Mental illness Neg Hx      Social History     Socioeconomic History    Marital status: /Civil Union     Spouse name: Not on file    Number of children: Not on file    Years of education: Not on file    Highest education level: Not on file   Occupational History    Not on file   Tobacco Use    Smoking status: Never Smoker    Smokeless tobacco: Never Used   Vaping Use    Vaping Use: Never used   Substance and Sexual Activity    Alcohol use: Yes     Comment: social- 2-3 glasses of wine    Drug use: No    Sexual activity: Not on file   Other Topics Concern    Not on file   Social History Narrative    Caffeine Use 1 cup per day    Per allscripts: Denied: History of [place]    Gnosticism     Social Determinants of Health Financial Resource Strain:     Difficulty of Paying Living Expenses:    Food Insecurity:     Worried About Running Out of Food in the Last Year:     [address] in the Last Year:    Transportation Needs:     Lack of Transportation (Medical):      Lack of Transportation (Non-Medical):    Physical Activity:     Days of Exercise per Week:     Minutes of Exercise per Session:    Stress:     Feeling of Stress :    Social Connections:     Frequency of Communication with Friends and Family:     Frequency of Social Gatherings with Friends and Family:     Attends Voodoo Services:     Active Member of Clubs or Organizations:     Attends Club or Organization Meetings:     Marital Status:    Intimate Partner Violence:     Fear of Current or Ex-Partner:     Emotionally Abused:     Physically Abused:     Sexually Abused:        Current Outpatient Medications:     albuterol (VENTOLIN HFA) 90 mcg/act inhaler, Inhale 2 puffs every 6 (six) hours as needed for wheezing or shortness of breath, Disp: 3 each, Rfl: 0    Cholecalciferol (VITAMIN D3) 1000 units CHEW, Chew, Disp: , Rfl:     fluticasone (FLONASE) 50 mcg/act nasal spray, USE 2 SPRAYS IN EACH NOSTRIL DAILY, Disp: 48 g, Rfl: 3    hydrochlorothiazide (HYDRODIURIL) 25 mg tablet, Take 1 tablet (25 mg total) by mouth daily, Disp: 90 tablet, Rfl: 3    Ibuprofen (ADVIL) 200 MG CAPS, Take 2 capsules by mouth 2 (two) times a day, Disp: , Rfl:     meclizine (ANTIVERT) 25 mg tablet, Take 1 tablet by mouth every 8 (eight) hours as needed for dizziness, Disp: 10 tablet, Rfl: 0    multivitamin (THERAGRAN) TABS, Take 1 tablet by mouth daily, Disp: , Rfl:     pimecrolimus (Elidel) 1 % cream, Apply topically 2 (two) times a day, Disp: 30 g, Rfl: 2    RABEprazole (ACIPHEX) 20 MG tablet, Take 20 mg by mouth daily, Disp: , Rfl:     vitamin B-12 (VITAMIN B-12) 1,000 mcg tablet, Take by mouth daily, Disp: , Rfl:     Review of Systems   Constitutional: Negative for appetite change, diaphoresis, fatigue and fever  Respiratory: Negative for cough, shortness of breath and wheezing  Cardiovascular: Negative for chest pain, palpitations and leg swelling  Genitourinary: Negative for urgency  Musculoskeletal:        As per HPI   Neurological: Negative for dizziness, weakness, numbness and headaches  Objective:    Vitals:    06/25/21 1457   BP: 128/82   BP Location: Left arm   Patient Position: Sitting   Cuff Size: Standard   Pulse: 72   Resp: 16   Weight: 64 2 kg (141 lb 9 6 oz)   Height: 5' 3 25" (1 607 m)        Physical Exam  Constitutional:       General: She is not in acute distress  Appearance: Normal appearance  Musculoskeletal:      Right lower leg: No edema  Left lower leg: No edema  Comments: Right shoulder ROM with pain on AB/ IR, no AC joint tenderness, muscle strength 5/5 in upper extremities  Left knee without erythema or effusion, good ROM, no joint line tenderness, no ligamentous laxity   Neurological:      Mental Status: She is alert and oriented to person, place, and time     Psychiatric:         Mood and Affect: Mood normal          Behavior: Behavior normal

## 2021-07-02 ENCOUNTER — TELEPHONE (OUTPATIENT)
Dept: FAMILY MEDICINE CLINIC | Facility: CLINIC | Age: 61
End: 2021-07-02

## 2021-07-02 NOTE — TELEPHONE ENCOUNTER
X-ray results reviewed with patient, advised to start PT and follow up in the office or see Ortho if her symptoms are not better

## 2021-11-09 ENCOUNTER — OFFICE VISIT (OUTPATIENT)
Dept: FAMILY MEDICINE CLINIC | Facility: CLINIC | Age: 61
End: 2021-11-09
Payer: COMMERCIAL

## 2021-11-09 VITALS
SYSTOLIC BLOOD PRESSURE: 132 MMHG | HEART RATE: 74 BPM | RESPIRATION RATE: 16 BRPM | WEIGHT: 143 LBS | DIASTOLIC BLOOD PRESSURE: 78 MMHG | BODY MASS INDEX: 25.34 KG/M2 | HEIGHT: 63 IN

## 2021-11-09 DIAGNOSIS — M06.09 RHEUMATOID ARTHRITIS OF MULTIPLE SITES WITH NEGATIVE RHEUMATOID FACTOR (HCC): ICD-10-CM

## 2021-11-09 DIAGNOSIS — J45.20 MILD INTERMITTENT ASTHMA WITHOUT COMPLICATION: ICD-10-CM

## 2021-11-09 DIAGNOSIS — R09.81 SINUS CONGESTION: ICD-10-CM

## 2021-11-09 DIAGNOSIS — Z23 NEED FOR PROPHYLACTIC VACCINATION AND INOCULATION AGAINST INFLUENZA: ICD-10-CM

## 2021-11-09 DIAGNOSIS — I10 ESSENTIAL HYPERTENSION: Primary | ICD-10-CM

## 2021-11-09 DIAGNOSIS — M25.511 RIGHT SHOULDER PAIN, UNSPECIFIED CHRONICITY: ICD-10-CM

## 2021-11-09 DIAGNOSIS — H93.19 TINNITUS, UNSPECIFIED LATERALITY: ICD-10-CM

## 2021-11-09 DIAGNOSIS — K21.9 GASTROESOPHAGEAL REFLUX DISEASE, UNSPECIFIED WHETHER ESOPHAGITIS PRESENT: ICD-10-CM

## 2021-11-09 PROCEDURE — 90682 RIV4 VACC RECOMBINANT DNA IM: CPT

## 2021-11-09 PROCEDURE — 99214 OFFICE O/P EST MOD 30 MIN: CPT | Performed by: FAMILY MEDICINE

## 2021-11-09 PROCEDURE — 90471 IMMUNIZATION ADMIN: CPT

## 2021-11-09 PROCEDURE — 3078F DIAST BP <80 MM HG: CPT | Performed by: FAMILY MEDICINE

## 2021-11-09 PROCEDURE — 3008F BODY MASS INDEX DOCD: CPT | Performed by: FAMILY MEDICINE

## 2021-11-09 PROCEDURE — 3075F SYST BP GE 130 - 139MM HG: CPT | Performed by: FAMILY MEDICINE

## 2021-11-10 ENCOUNTER — TELEPHONE (OUTPATIENT)
Dept: ADMINISTRATIVE | Facility: OTHER | Age: 61
End: 2021-11-10

## 2021-12-14 ENCOUNTER — OFFICE VISIT (OUTPATIENT)
Dept: OBGYN CLINIC | Facility: CLINIC | Age: 61
End: 2021-12-14
Payer: COMMERCIAL

## 2021-12-14 VITALS
WEIGHT: 143 LBS | HEART RATE: 76 BPM | DIASTOLIC BLOOD PRESSURE: 80 MMHG | SYSTOLIC BLOOD PRESSURE: 123 MMHG | HEIGHT: 63 IN | BODY MASS INDEX: 25.34 KG/M2

## 2021-12-14 DIAGNOSIS — G89.29 CHRONIC RIGHT SHOULDER PAIN: ICD-10-CM

## 2021-12-14 DIAGNOSIS — M25.511 CHRONIC RIGHT SHOULDER PAIN: ICD-10-CM

## 2021-12-14 PROCEDURE — 3079F DIAST BP 80-89 MM HG: CPT | Performed by: PHYSICAL MEDICINE & REHABILITATION

## 2021-12-14 PROCEDURE — 3074F SYST BP LT 130 MM HG: CPT | Performed by: PHYSICAL MEDICINE & REHABILITATION

## 2021-12-14 PROCEDURE — 99214 OFFICE O/P EST MOD 30 MIN: CPT | Performed by: PHYSICAL MEDICINE & REHABILITATION

## 2021-12-14 PROCEDURE — 1036F TOBACCO NON-USER: CPT | Performed by: PHYSICAL MEDICINE & REHABILITATION

## 2021-12-14 PROCEDURE — 3008F BODY MASS INDEX DOCD: CPT | Performed by: PHYSICAL MEDICINE & REHABILITATION

## 2022-01-01 NOTE — PLAN OF CARE
Problem: DISCHARGE PLANNING  Goal: Discharge to home or other facility with appropriate resources  INTERVENTIONS:  - Identify barriers to discharge w/patient and caregiver  - Arrange for needed discharge resources and transportation as appropriate  - Identify discharge learning needs (meds, wound care, etc )  - Arrange for interpretive services to assist at discharge as needed  - Refer to Case Management Department for coordinating discharge planning if the patient needs post-hospital services based on physician/advanced practitioner order or complex needs related to functional status, cognitive ability, or social support system   -Pt is generally independent  Discharge to home  Outcome: Adequate for Discharge  Pt will be returning home without services   will transport  I will START or STAY ON the medications listed below when I get home from the hospital:  None

## 2022-01-15 ENCOUNTER — IMMUNIZATIONS (OUTPATIENT)
Dept: FAMILY MEDICINE CLINIC | Facility: HOSPITAL | Age: 62
End: 2022-01-15

## 2022-01-15 DIAGNOSIS — Z23 ENCOUNTER FOR IMMUNIZATION: Primary | ICD-10-CM

## 2022-01-15 PROCEDURE — 91300 COVID-19 PFIZER VACC 0.3 ML: CPT

## 2022-01-15 PROCEDURE — 0001A COVID-19 PFIZER VACC 0.3 ML: CPT

## 2022-01-19 ENCOUNTER — HOSPITAL ENCOUNTER (OUTPATIENT)
Dept: RADIOLOGY | Age: 62
Discharge: HOME | End: 2022-01-19
Payer: COMMERCIAL

## 2022-01-19 VITALS — BODY MASS INDEX: 25.34 KG/M2 | WEIGHT: 143 LBS | HEIGHT: 63 IN

## 2022-01-19 DIAGNOSIS — Z12.31 ENCOUNTER FOR SCREENING MAMMOGRAM FOR MALIGNANT NEOPLASM OF BREAST: ICD-10-CM

## 2022-01-19 PROCEDURE — 77067 SCR MAMMO BI INCL CAD: CPT

## 2022-01-19 PROCEDURE — 77063 BREAST TOMOSYNTHESIS BI: CPT

## 2022-04-24 LAB
ALBUMIN SERPL-MCNC: 4.6 G/DL (ref 3.6–5.1)
ALBUMIN/GLOB SERPL: 1.9 (CALC) (ref 1–2.5)
ALP SERPL-CCNC: 85 U/L (ref 37–153)
ALT SERPL-CCNC: 17 U/L (ref 6–29)
APPEARANCE UR: CLEAR
AST SERPL-CCNC: 22 U/L (ref 10–35)
BACTERIA UR QL AUTO: NORMAL /HPF
BASOPHILS # BLD AUTO: 62 CELLS/UL (ref 0–200)
BASOPHILS NFR BLD AUTO: 1.5 %
BILIRUB SERPL-MCNC: 0.7 MG/DL (ref 0.2–1.2)
BILIRUB UR QL STRIP: NEGATIVE
BUN SERPL-MCNC: 14 MG/DL (ref 7–25)
BUN/CREAT SERPL: ABNORMAL (CALC) (ref 6–22)
CALCIUM SERPL-MCNC: 9.6 MG/DL (ref 8.6–10.4)
CHLORIDE SERPL-SCNC: 98 MMOL/L (ref 98–110)
CO2 SERPL-SCNC: 34 MMOL/L (ref 20–32)
COLOR UR: YELLOW
CREAT SERPL-MCNC: 0.76 MG/DL (ref 0.5–0.99)
EOSINOPHIL # BLD AUTO: 111 CELLS/UL (ref 15–500)
EOSINOPHIL NFR BLD AUTO: 2.7 %
ERYTHROCYTE [DISTWIDTH] IN BLOOD BY AUTOMATED COUNT: 11.3 % (ref 11–15)
GLOBULIN SER CALC-MCNC: 2.4 G/DL (CALC) (ref 1.9–3.7)
GLUCOSE SERPL-MCNC: 87 MG/DL (ref 65–99)
GLUCOSE UR QL STRIP: NEGATIVE
HCT VFR BLD AUTO: 41.7 % (ref 35–45)
HGB BLD-MCNC: 14.1 G/DL (ref 11.7–15.5)
HGB UR QL STRIP: NEGATIVE
HYALINE CASTS #/AREA URNS LPF: NORMAL /LPF
KETONES UR QL STRIP: NEGATIVE
LEUKOCYTE ESTERASE UR QL STRIP: NEGATIVE
LYMPHOCYTES # BLD AUTO: 1394 CELLS/UL (ref 850–3900)
LYMPHOCYTES NFR BLD AUTO: 34 %
MCH RBC QN AUTO: 33.6 PG (ref 27–33)
MCHC RBC AUTO-ENTMCNC: 33.8 G/DL (ref 32–36)
MCV RBC AUTO: 99.3 FL (ref 80–100)
MONOCYTES # BLD AUTO: 459 CELLS/UL (ref 200–950)
MONOCYTES NFR BLD AUTO: 11.2 %
NEUTROPHILS # BLD AUTO: 2075 CELLS/UL (ref 1500–7800)
NEUTROPHILS NFR BLD AUTO: 50.6 %
NITRITE UR QL STRIP: NEGATIVE
PH UR STRIP: 8 [PH] (ref 5–8)
PLATELET # BLD AUTO: 255 THOUSAND/UL (ref 140–400)
PMV BLD REES-ECKER: 9.6 FL (ref 7.5–12.5)
POTASSIUM SERPL-SCNC: 3.9 MMOL/L (ref 3.5–5.3)
PROT SERPL-MCNC: 7 G/DL (ref 6.1–8.1)
PROT UR QL STRIP: NEGATIVE
RBC # BLD AUTO: 4.2 MILLION/UL (ref 3.8–5.1)
RBC #/AREA URNS HPF: NORMAL /HPF
SL AMB EGFR AFRICAN AMERICAN: 96 ML/MIN/1.73M2
SL AMB EGFR NON AFRICAN AMERICAN: 83 ML/MIN/1.73M2
SODIUM SERPL-SCNC: 137 MMOL/L (ref 135–146)
SP GR UR STRIP: 1.02 (ref 1–1.03)
SQUAMOUS #/AREA URNS HPF: NORMAL /HPF
TSH SERPL-ACNC: 1.41 MIU/L (ref 0.4–4.5)
WBC # BLD AUTO: 4.1 THOUSAND/UL (ref 3.8–10.8)
WBC #/AREA URNS HPF: NORMAL /HPF

## 2022-05-03 ENCOUNTER — OFFICE VISIT (OUTPATIENT)
Dept: FAMILY MEDICINE CLINIC | Facility: CLINIC | Age: 62
End: 2022-05-03
Payer: COMMERCIAL

## 2022-05-03 VITALS
HEIGHT: 63 IN | HEART RATE: 74 BPM | BODY MASS INDEX: 25.87 KG/M2 | DIASTOLIC BLOOD PRESSURE: 80 MMHG | RESPIRATION RATE: 16 BRPM | SYSTOLIC BLOOD PRESSURE: 122 MMHG | WEIGHT: 146 LBS

## 2022-05-03 DIAGNOSIS — K21.9 GASTROESOPHAGEAL REFLUX DISEASE, UNSPECIFIED WHETHER ESOPHAGITIS PRESENT: ICD-10-CM

## 2022-05-03 DIAGNOSIS — Z00.00 ANNUAL PHYSICAL EXAM: Primary | ICD-10-CM

## 2022-05-03 DIAGNOSIS — Z23 NEED FOR TDAP VACCINATION: ICD-10-CM

## 2022-05-03 DIAGNOSIS — M06.09 RHEUMATOID ARTHRITIS OF MULTIPLE SITES WITH NEGATIVE RHEUMATOID FACTOR (HCC): ICD-10-CM

## 2022-05-03 DIAGNOSIS — L30.9 ECZEMA, UNSPECIFIED TYPE: ICD-10-CM

## 2022-05-03 DIAGNOSIS — J45.20 MILD INTERMITTENT ASTHMA WITHOUT COMPLICATION: ICD-10-CM

## 2022-05-03 DIAGNOSIS — I10 ESSENTIAL HYPERTENSION: ICD-10-CM

## 2022-05-03 PROCEDURE — 3008F BODY MASS INDEX DOCD: CPT | Performed by: FAMILY MEDICINE

## 2022-05-03 PROCEDURE — 99396 PREV VISIT EST AGE 40-64: CPT | Performed by: FAMILY MEDICINE

## 2022-05-03 PROCEDURE — 3079F DIAST BP 80-89 MM HG: CPT | Performed by: FAMILY MEDICINE

## 2022-05-03 PROCEDURE — 90471 IMMUNIZATION ADMIN: CPT

## 2022-05-03 PROCEDURE — 3725F SCREEN DEPRESSION PERFORMED: CPT | Performed by: FAMILY MEDICINE

## 2022-05-03 PROCEDURE — 3074F SYST BP LT 130 MM HG: CPT | Performed by: FAMILY MEDICINE

## 2022-05-03 PROCEDURE — 99213 OFFICE O/P EST LOW 20 MIN: CPT | Performed by: FAMILY MEDICINE

## 2022-05-03 PROCEDURE — 90715 TDAP VACCINE 7 YRS/> IM: CPT

## 2022-05-03 PROCEDURE — 1036F TOBACCO NON-USER: CPT | Performed by: FAMILY MEDICINE

## 2022-05-03 RX ORDER — PIMECROLIMUS 10 MG/G
CREAM TOPICAL 2 TIMES DAILY
Qty: 30 G | Refills: 2 | Status: SHIPPED | OUTPATIENT
Start: 2022-05-03

## 2022-05-03 NOTE — PROGRESS NOTES
ADULT ANNUAL PHYSICAL  [facility] PRACT[facility]    NAME: Cammie Ridpattie  AGE: 57 y o  SEX: female  : 1960     DATE: 5/3/2022     Assessment and Plan:     1  Annual physical exam    2  Need for Tdap vaccination  - TDAP VACCINE GREATER THAN OR EQUAL TO 6YO IM    Immunizations and preventive care screenings were discussed with patient today  Appropriate education was printed on patient's after visit summary  Counseling:  Alcohol/drug use: discussed moderation in alcohol intake, the recommendations for healthy alcohol use, and avoidance of illicit drug use  Dental Health: discussed importance of regular tooth brushing, flossing, and dental visits  Injury prevention: discussed safety/seat belts, safety helmets, smoke detectors, carbon dioxide detectors, and smoking near bedding or upholstery  · Exercise: the importance of regular exercise/physical activity was discussed  Recommend exercise 3-5 times per week for at least 30 minutes  Return in about 6 months (around 11/3/2022) for Recheck  Chief Complaint:     Chief Complaint   Patient presents with    Physical Exam      History of Present Illness:     Adult Annual Physical   Patient here for a comprehensive physical exam     Diet and Physical Activity  · Diet/Nutrition: well balanced diet  · Exercise: walking  Depression Screening  PHQ-2/9 Depression Screening    Little interest or pleasure in doing things: 0 - not at all  Feeling down, depressed, or hopeless: 0 - not at all  PHQ-2 Score: 0  PHQ-2 Interpretation: Negative depression screen       General Health  · Sleep: sleeps well  · Hearing: decreased - right  · Vision: goes for regular eye exams and wears glasses  · Dental: regular dental visits         /GYN Health  · Patient is: postmenopausal     Review of Systems:     Review of Systems   Constitutional: Negative for appetite change, diaphoresis, fatigue and fever    HENT: Negative for congestion, ear pain, sore throat and trouble swallowing  Eyes: Negative for pain, discharge, redness, itching and visual disturbance  Respiratory: Negative for cough, shortness of breath and wheezing  Cardiovascular: Negative for chest pain, palpitations and leg swelling  Gastrointestinal: Negative for abdominal pain, blood in stool, diarrhea, nausea and vomiting  Endocrine: Negative for cold intolerance, heat intolerance, polydipsia and polyuria  Genitourinary: Negative for dysuria, frequency, hematuria, pelvic pain and urgency  Musculoskeletal: Positive for arthralgias  Skin: Negative for rash  Neurological: Negative for dizziness, syncope, weakness, numbness and headaches  Hematological: Negative for adenopathy  Psychiatric/Behavioral: Negative for dysphoric mood and sleep disturbance  The patient is not nervous/anxious  Past Medical History:     Past Medical History:   Diagnosis Date    Abnormal findings on diagnostic imaging of breast     Last assessed 13    Adjustment disorder with depressed mood     Last assessed 13    Anxiety     Arthritis     hands    Erythema migrans (Lyme disease)     Last assessed 13    GERD (gastroesophageal reflux disease)     Polyarthritis     Last assessed 16    Uterine leiomyoma     Vertigo       Past Surgical History:     Past Surgical History:   Procedure Laterality Date    BREAST BIOPSY Left 2002    BREAST SURGERY      CARPAL TUNNEL RELEASE Bilateral 10/2008    DENTAL SURGERY      DILATION AND CURETTAGE OF UTERUS      HYSTEROSCOPY      w/D and C on 06 for irregular menses    Her pathology demonstrated proliferative endometrium    INDUCED       MOHS RECONSTRUCTION Right 2019    Procedure: RECONSTRUCTION MOHS DEFECT RIGHT NOSE;  Surgeon: Felipe Kaufman MD;  Location: [facility] OR;  Service: Plastics    [place] ADJ TISS XFER LID,NOS,EAR 10 1-30 SQCM Right 2019 Procedure: FLAP LOCAL RIGHT NOSE VS FTSG;  Surgeon: Lor Clifton MD;  Location:  MAIN OR;  Service: Plastics    TONSILLECTOMY      WRIST SURGERY Right 2009      Social History:     Social History     Socioeconomic History    Marital status: /Civil Union     Spouse name: None    Number of children: None    Years of education: None    Highest education level: None   Occupational History    None   Tobacco Use    Smoking status: Never Smoker    Smokeless tobacco: Never Used   Vaping Use    Vaping Use: Never used   Substance and Sexual Activity    Alcohol use: Yes     Comment: social- 2-3 glasses of wine    Drug use: No    Sexual activity: None   Other Topics Concern    None   Social History Narrative    Caffeine Use 1 cup per day    Per allscripts: Denied: History of [facility]     Social Determinants of Health     Financial Resource Strain: Not on file   Food Insecurity: Not on file   Transportation Needs: Not on file   Physical Activity: Not on file   Stress: Not on file   Social Connections: Not on file   Intimate Partner Violence: Not on file   Housing Stability: Not on file      Family History:     Family History   Problem Relation Age of Onset    Arthritis Mother     Hypertension Mother     Coronary artery disease Mother     Skin cancer Mother     Varicose Veins Mother     Uterine cancer Mother     Arthritis Father     Hypertension Father     Stroke Father     Coronary artery disease Father     Other Father         Stent indications    Ulcerative colitis Sister     Rheum arthritis Maternal Grandmother     Substance Abuse Neg Hx     Mental illness Neg Hx       Current Medications:     Current Outpatient Medications   Medication Sig Dispense Refill    albuterol (VENTOLIN HFA) 90 mcg/act inhaler Inhale 2 puffs every 6 (six) hours as needed for wheezing or shortness of breath 3 each 0    Cholecalciferol (VITAMIN D3) 1000 units CHEW Chew      fluticasone (FLONASE) 50 mcg/act nasal spray USE 2 SPRAYS IN EACH NOSTRIL DAILY 48 g 3    hydrochlorothiazide (HYDRODIURIL) 25 mg tablet Take 1 tablet (25 mg total) by mouth daily 90 tablet 3    Ibuprofen (ADVIL) 200 MG CAPS Take 2 capsules by mouth 2 (two) times a day      meclizine (ANTIVERT) 25 mg tablet Take 1 tablet by mouth every 8 (eight) hours as needed for dizziness 10 tablet 0    multivitamin (THERAGRAN) TABS Take 1 tablet by mouth daily      pimecrolimus (Elidel) 1 % cream Apply topically 2 (two) times a day 30 g 2    RABEprazole (ACIPHEX) 20 MG tablet Take 20 mg by mouth daily      vitamin B-12 (VITAMIN B-12) 1,000 mcg tablet Take by mouth daily       No current facility-administered medications for this visit  Allergies: Allergies   Allergen Reactions    Other Allergic Rhinitis     Seasonal  Mold      Physical Exam:     /80   Pulse 74   Resp 16   Ht 5' 3 25" (1 607 m)   Wt 66 2 kg (146 lb)   LMP  (LMP Unknown)   BMI 25 66 kg/m²     Physical Exam  Constitutional:       General: She is not in acute distress  Appearance: Normal appearance  She is well-developed  HENT:      Right Ear: Tympanic membrane, ear canal and external ear normal       Left Ear: Tympanic membrane, ear canal and external ear normal       Mouth/Throat:      Mouth: Mucous membranes are moist       Pharynx: Oropharynx is clear  Eyes:      General: No scleral icterus  Extraocular Movements: Extraocular movements intact  Conjunctiva/sclera: Conjunctivae normal       Pupils: Pupils are equal, round, and reactive to light  Neck:      Thyroid: No thyromegaly  Vascular: No JVD  Cardiovascular:      Rate and Rhythm: Normal rate and regular rhythm  Heart sounds: Normal heart sounds  No murmur heard  Pulmonary:      Effort: Pulmonary effort is normal  No respiratory distress  Breath sounds: Normal breath sounds  No wheezing, rhonchi or rales     Abdominal:      General: Bowel sounds are normal  There is no distension  Palpations: Abdomen is soft  There is no mass  Tenderness: There is no abdominal tenderness  Musculoskeletal:      Cervical back: Neck supple  Right lower leg: No edema  Left lower leg: No edema  Lymphadenopathy:      Cervical: No cervical adenopathy  Skin:     Findings: No rash  Neurological:      General: No focal deficit present  Mental Status: She is alert and oriented to person, place, and time  Cranial Nerves: No cranial nerve deficit  Psychiatric:         Mood and Affect: Mood normal          Behavior: Behavior normal          Thought Content:  Thought content normal          Judgment: Judgment normal         MD [facility]

## 2022-05-03 NOTE — PROGRESS NOTES
Assessment/Plan:    1  Essential hypertension   - well controlled, continue HCTZ 25 mg daily    2  Gastroesophageal reflux disease  - patient is doing well on Aciphex 20 mg daily and dietary changes  - advised to continue regular follow up with GI    3  Rheumatoid arthritis of multiple sites   - encouraged patient to schedule Rheumatology follow up    4  Mild intermittent asthma   - uses Abuterol inhaler prior to yard work with good results    5  Eczema, unspecified type  - uses Elidel as needed with good results  - pimecrolimus (Elidel) 1 % cream; Apply topically 2 (two) times a day  Dispense: 30 g; Refill: 2       Follow up in 6 months or sooner if needed  The patient understands and agrees with the treatment plan  Subjective:   Chief Complaint   Patient presents with    Physical Exam    Hypertension    Asthma      Patient ID: Danell Favre is a 57 y o  female who presents today for follow up visit for her chronic conditions  Patient reports joint aches and stiffness in her hands and feet that getting progressively worse in the past few months, she is taking ibuprofen without significant relief  Patient states her right shoulder pain has now improved  Patient offers no other complaints         The following portions of the patient's history were reviewed and updated as appropriate: allergies, current medications, past family history, past medical history, past social history, past surgical history and problem list     Past Medical History:   Diagnosis Date    Abnormal findings on diagnostic imaging of breast     Last assessed 08/16/13    Adjustment disorder with depressed mood     Last assessed 05/17/13    Anxiety     Arthritis     hands    Erythema migrans (Lyme disease)     Last assessed 06/29/13    GERD (gastroesophageal reflux disease)     Polyarthritis     Last assessed 02/17/16    Uterine leiomyoma     Vertigo      Past Surgical History:   Procedure Laterality Date    BREAST BIOPSY Left 2002    BREAST SURGERY      CARPAL TUNNEL RELEASE Bilateral 10/2008    DENTAL SURGERY      DILATION AND CURETTAGE OF UTERUS      HYSTEROSCOPY      w/D and C on 06 for irregular menses    Her pathology demonstrated proliferative endometrium    INDUCED       MOHS RECONSTRUCTION Right 2019    Procedure: RECONSTRUCTION MOHS DEFECT RIGHT NOSE;  Surgeon: Cristy Lamas MD;  [facility]: [facility];  Service: Plastics    [place] ADJ TISS [place] 10 1-30 SQCM Right 2019    Procedure: FLAP LOCAL RIGHT NOSE VS FTSG;  Surgeon: Cristy Lamas MD;  Location: [facility];  Service: Plastics    TONSILLECTOMY      WRIST SURGERY Right 2009     Family History   Problem Relation Age of Onset    Arthritis Mother     Hypertension Mother     Coronary artery disease Mother     Skin cancer Mother     Varicose Veins Mother     Uterine cancer Mother     Arthritis Father     Hypertension Father     Stroke Father     Coronary artery disease Father     Other Father         Stent indications    Ulcerative colitis Sister     Rheum arthritis Maternal Grandmother     Substance Abuse Neg Hx     Mental illness Neg Hx      Social History     Socioeconomic History    Marital status: /Civil Union     Spouse name: Not on file    Number of children: Not on file    Years of education: Not on file    Highest education level: Not on file   Occupational History    Not on file   Tobacco Use    Smoking status: Never Smoker    Smokeless tobacco: Never Used   Vaping Use    Vaping Use: Never used   Substance and Sexual Activity    Alcohol use: Yes     Comment: social- 2-3 glasses of wine    Drug use: No    Sexual activity: Not on file   Other Topics Concern    Not on file   Social History Narrative    Caffeine Use 1 cup per day    Per allscripts: Denied: History of [place]    [employer]     Social Determinants of Health     Financial Resource Strain: Not on [employer] Insecurity: Not on file   Transportation Needs: Not on file   Physical Activity: Not on file   Stress: Not on file   Social Connections: Not on file   Intimate Partner Violence: Not on file   Housing Stability: Not on file       Current Outpatient Medications:     albuterol (VENTOLIN HFA) 90 mcg/act inhaler, Inhale 2 puffs every 6 (six) hours as needed for wheezing or shortness of breath, Disp: 3 each, Rfl: 0    Cholecalciferol (VITAMIN D3) 1000 units CHEW, Chew, Disp: , Rfl:     fluticasone (FLONASE) 50 mcg/act nasal spray, USE 2 SPRAYS IN EACH NOSTRIL DAILY, Disp: 48 g, Rfl: 3    hydrochlorothiazide (HYDRODIURIL) 25 mg tablet, Take 1 tablet (25 mg total) by mouth daily, Disp: 90 tablet, Rfl: 3    Ibuprofen (ADVIL) 200 MG CAPS, Take 2 capsules by mouth 2 (two) times a day, Disp: , Rfl:     meclizine (ANTIVERT) 25 mg tablet, Take 1 tablet by mouth every 8 (eight) hours as needed for dizziness, Disp: 10 tablet, Rfl: 0    multivitamin (THERAGRAN) TABS, Take 1 tablet by mouth daily, Disp: , Rfl:     pimecrolimus (Elidel) 1 % cream, Apply topically 2 (two) times a day, Disp: 30 g, Rfl: 2    RABEprazole (ACIPHEX) 20 MG tablet, Take 20 mg by mouth daily, Disp: , Rfl:     vitamin B-12 (VITAMIN B-12) 1,000 mcg tablet, Take by mouth daily, Disp: , Rfl:     Review of Systems   Constitutional: Negative for appetite change, diaphoresis, fatigue and fever  HENT: Negative for congestion and sore throat  Respiratory: Negative for cough, shortness of breath and wheezing  Cardiovascular: Negative for chest pain, palpitations and leg swelling  Gastrointestinal: Negative for abdominal pain, blood in stool, diarrhea, nausea and vomiting  Genitourinary: Negative for dysuria, frequency, hematuria, pelvic pain and urgency  Musculoskeletal:        Arthralgias hands, feet   Neurological: Negative for dizziness, syncope, weakness, numbness and headaches  Hematological: Negative for adenopathy  Psychiatric/Behavioral: Negative for dysphoric mood and sleep disturbance  The patient is not nervous/anxious  Objective:    Vitals:    05/03/22 1551   BP: 122/80   Pulse: 74   Resp: 16   Weight: 66 2 kg (146 lb)   Height: 5' 3 25" (1 607 m)     Wt Readings from Last 3 Encounters:   05/03/22 66 2 kg (146 lb)   01/19/22 64 9 kg (143 lb)   12/14/21 64 9 kg (143 lb)     BP Readings from Last 3 Encounters:   05/03/22 122/80   12/14/21 123/80   11/09/21 132/78        Physical Exam  Constitutional:       General: She is not in acute distress  Appearance: She is well-developed  Neck:      Thyroid: No thyromegaly  Cardiovascular:      Rate and Rhythm: Normal rate and regular rhythm  Heart sounds: Normal heart sounds  No murmur heard  Pulmonary:      Effort: Pulmonary effort is normal  No respiratory distress  Breath sounds: Normal breath sounds  Abdominal:      General: There is no distension  Palpations: Abdomen is soft  There is no mass  Tenderness: There is no abdominal tenderness  Musculoskeletal:      Cervical back: Neck supple  Right lower leg: No edema  Left lower leg: No edema  Lymphadenopathy:      Cervical: No cervical adenopathy  Neurological:      Mental Status: She is alert and oriented to person, place, and time  Psychiatric:         Mood and Affect: Mood normal          Behavior: Behavior normal          Thought Content:  Thought content normal         Lab Results   Component Value Date     07/01/2017    SODIUM 137 04/23/2022    K 3 9 04/23/2022    CL 98 04/23/2022    CO2 34 (H) 04/23/2022    AGAP 6 11/15/2017    BUN 14 04/23/2022    CREATININE 0 76 04/23/2022    GLUC 87 04/23/2022    GLUF 98 11/15/2017    CALCIUM 9 6 04/23/2022    AST 22 04/23/2022    ALT 17 04/23/2022    ALKPHOS 85 04/23/2022    PROT 6 6 07/01/2017    TP 7 0 04/23/2022    BILITOT 0 6 07/01/2017    TBILI 0 7 04/23/2022    EGFR 82 11/15/2017     Lab Results   Component Value Date    WBC 4 1 04/23/2022    HGB 14 1 04/23/2022    HCT 41 7 04/23/2022    MCV 99 3 04/23/2022     04/23/2022     Lab Results   Component Value Date    [id number] 1 719 11/14/2017

## 2022-05-03 NOTE — PATIENT INSTRUCTIONS

## 2022-07-06 DIAGNOSIS — I10 ESSENTIAL HYPERTENSION: ICD-10-CM

## 2022-07-06 DIAGNOSIS — J31.0 CHRONIC RHINITIS: ICD-10-CM

## 2022-07-06 RX ORDER — FLUTICASONE PROPIONATE 50 MCG
SPRAY, SUSPENSION (ML) NASAL
Qty: 48 G | Refills: 3 | Status: SHIPPED | OUTPATIENT
Start: 2022-07-06

## 2022-07-06 RX ORDER — HYDROCHLOROTHIAZIDE 25 MG/1
TABLET ORAL
Qty: 90 TABLET | Refills: 3 | Status: SHIPPED | OUTPATIENT
Start: 2022-07-06

## 2022-08-17 ENCOUNTER — APPOINTMENT (OUTPATIENT)
Dept: LAB | Facility: CLINIC | Age: 62
End: 2022-08-17
Payer: COMMERCIAL

## 2022-08-17 ENCOUNTER — HOSPITAL ENCOUNTER (OUTPATIENT)
Dept: RADIOLOGY | Facility: HOSPITAL | Age: 62
Discharge: HOME | End: 2022-08-17
Payer: COMMERCIAL

## 2022-08-17 ENCOUNTER — OFFICE VISIT (OUTPATIENT)
Dept: FAMILY MEDICINE CLINIC | Facility: CLINIC | Age: 62
End: 2022-08-17
Payer: COMMERCIAL

## 2022-08-17 VITALS
WEIGHT: 140 LBS | HEIGHT: 63 IN | DIASTOLIC BLOOD PRESSURE: 80 MMHG | SYSTOLIC BLOOD PRESSURE: 118 MMHG | HEART RATE: 67 BPM | RESPIRATION RATE: 16 BRPM | BODY MASS INDEX: 24.8 KG/M2

## 2022-08-17 DIAGNOSIS — R10.32 LLQ ABDOMINAL PAIN: ICD-10-CM

## 2022-08-17 DIAGNOSIS — R10.32 LLQ ABDOMINAL PAIN: Primary | ICD-10-CM

## 2022-08-17 DIAGNOSIS — M25.552 LEFT HIP PAIN: ICD-10-CM

## 2022-08-17 LAB
ANION GAP SERPL CALCULATED.3IONS-SCNC: 4 MMOL/L (ref 4–13)
BUN SERPL-MCNC: 8 MG/DL (ref 5–25)
CALCIUM SERPL-MCNC: 9.8 MG/DL (ref 8.3–10.1)
CHLORIDE SERPL-SCNC: 102 MMOL/L (ref 96–108)
CO2 SERPL-SCNC: 30 MMOL/L (ref 21–32)
CREAT SERPL-MCNC: 0.8 MG/DL (ref 0.6–1.3)
GFR SERPL CREATININE-BSD FRML MDRD: 78 ML/MIN/1.73SQ M
GLUCOSE P FAST SERPL-MCNC: 95 MG/DL (ref 65–99)
POTASSIUM SERPL-SCNC: 3.6 MMOL/L (ref 3.5–5.3)
SL AMB  POCT GLUCOSE, UA: NORMAL
SL AMB LEUKOCYTE ESTERASE,UA: NORMAL
SL AMB POCT BILIRUBIN,UA: NORMAL
SL AMB POCT BLOOD,UA: NORMAL
SL AMB POCT CLARITY,UA: CLEAR
SL AMB POCT COLOR,UA: YELLOW
SL AMB POCT KETONES,UA: NORMAL
SL AMB POCT NITRITE,UA: NORMAL
SL AMB POCT PH,UA: 5
SL AMB POCT SPECIFIC GRAVITY,UA: 1.01
SL AMB POCT URINE PROTEIN: NORMAL
SL AMB POCT UROBILINOGEN: 0.2
SODIUM SERPL-SCNC: 136 MMOL/L (ref 135–147)

## 2022-08-17 PROCEDURE — 3079F DIAST BP 80-89 MM HG: CPT | Performed by: FAMILY MEDICINE

## 2022-08-17 PROCEDURE — G1004 CDSM NDSC: HCPCS

## 2022-08-17 PROCEDURE — 80048 BASIC METABOLIC PNL TOTAL CA: CPT

## 2022-08-17 PROCEDURE — 81002 URINALYSIS NONAUTO W/O SCOPE: CPT | Performed by: FAMILY MEDICINE

## 2022-08-17 PROCEDURE — 74177 CT ABD & PELVIS W/CONTRAST: CPT

## 2022-08-17 PROCEDURE — 36415 COLL VENOUS BLD VENIPUNCTURE: CPT

## 2022-08-17 PROCEDURE — 99213 OFFICE O/P EST LOW 20 MIN: CPT | Performed by: FAMILY MEDICINE

## 2022-08-17 PROCEDURE — 3074F SYST BP LT 130 MM HG: CPT | Performed by: FAMILY MEDICINE

## 2022-08-17 RX ADMIN — IOHEXOL 90 ML: 350 INJECTION, SOLUTION INTRAVENOUS at 19:46

## 2022-08-17 NOTE — PROGRESS NOTES
Assessment/Plan:    1  LLQ abdominal pain  - urine dip showed trace blood and otherwise negative, will obtain CT abdomen/ pelvis, ua and urine culture  - CT abdomen pelvis w contrast; Future  - POCT urine dip  - Urine culture  - UA (URINE) with reflex to Scope  - Basic metabolic panel; Future    2  Left hip pain  - discussed rest, Tylenol as needed, will obtain x-rays if CT abdomen is unrevealing       Follow up if symptoms persist or worsen  The patient understands and agrees with the treatment plan  Subjective:   Chief Complaint   Patient presents with    Hip Pain     Near pelvic area       Patient ID: Margie Tay is a 57 y o  female who presents today with c/o pain over her left hip radiating into her left lower abdomen that comes and goes for the past 2-3 weeks, seem to start after excessive yard work and heavy lifting and worse with movement  Patient denies fever or chills, nausea, vomiting, diarrhea, constipation, melena, hematochezia, urinary frequency, urgency, hematuria, vaginal bleeding        The following portions of the patient's history were reviewed and updated as appropriate: allergies, current medications, past family history, past medical history, past social history, past surgical history and problem list     Past Medical History:   Diagnosis Date    Abnormal findings on diagnostic imaging of breast     Last assessed 08/16/13    Adjustment disorder with depressed mood     Last assessed 05/17/13    Anxiety     Arthritis     hands    Erythema migrans (Lyme disease)     Last assessed 06/29/13    GERD (gastroesophageal reflux disease)     Polyarthritis     Last assessed 02/17/16    Uterine leiomyoma     Vertigo      Past Surgical History:   Procedure Laterality Date    BREAST BIOPSY Left 02/2002    BREAST SURGERY      CARPAL TUNNEL RELEASE Bilateral 10/2008    DENTAL SURGERY      DILATION AND CURETTAGE OF UTERUS      HYSTEROSCOPY      w/D and C on 11/20/06 for irregular menses    Her pathology demonstrated proliferative endometrium    INDUCED       MOHS RECONSTRUCTION Right 2019    Procedure: RECONSTRUCTION MOHS DEFECT RIGHT NOSE;  Surgeon: Samantha Grant MD;  [facility]: [facility];  Service: Plastics    [place] ADJ TISS [place] 10 1-30 SQCM Right 2019    Procedure: FLAP LOCAL RIGHT NOSE VS FTSG;  Surgeon: Samantha Grant MD;  [facility]: [facility];  Service: Plastics    TONSILLECTOMY      WRIST SURGERY Right 2009     Family History   Problem Relation Age of Onset    Arthritis Mother     Hypertension Mother     Coronary artery disease Mother     Skin cancer Mother     Varicose Veins Mother     Uterine cancer Mother     Arthritis Father     Hypertension Father     Stroke Father     Coronary artery disease Father     Other Father         Stent indications    Ulcerative colitis Sister     Rheum arthritis Maternal Grandmother     Substance Abuse Neg Hx     Mental illness Neg Hx      Social History     Socioeconomic History    Marital status: /Civil Union     Spouse name: Not on file    Number of children: Not on file    Years of education: Not on file    Highest education level: Not on file   Occupational History    Not on file   Tobacco Use    Smoking status: Never Smoker    Smokeless tobacco: Never Used   Vaping Use    Vaping Use: Never used   Substance and Sexual Activity    Alcohol use: Yes     Comment: social- 2-3 glasses of wine    Drug use: No    Sexual activity: Not on file   Other Topics Concern    Not on file   Social History Narrative    Caffeine Use 1 cup per day    Per allscripts: Denied: History of [place]    [employer]     Social Determinants of Health     Financial Resource Strain: Not on file   Food Insecurity: Not on file   Transportation Needs: Not on file   Physical Activity: Not on file   Stress: Not on file   Social Connections: Not on file   Intimate Partner Violence: Not on file   Housing Stability: Not on file       Current Outpatient Medications:     albuterol (VENTOLIN HFA) 90 mcg/act inhaler, Inhale 2 puffs every 6 (six) hours as needed for wheezing or shortness of breath, Disp: 3 each, Rfl: 0    Cholecalciferol (VITAMIN D3) 1000 units CHEW, Chew, Disp: , Rfl:     fluticasone (FLONASE) 50 mcg/act nasal spray, USE 2 SPRAYS IN EACH NOSTRIL DAILY, Disp: 48 g, Rfl: 3    hydrochlorothiazide (HYDRODIURIL) 25 mg tablet, TAKE 1 TABLET DAILY, Disp: 90 tablet, Rfl: 3    Ibuprofen 200 MG CAPS, Take 2 capsules by mouth 2 (two) times a day, Disp: , Rfl:     meclizine (ANTIVERT) 25 mg tablet, Take 1 tablet by mouth every 8 (eight) hours as needed for dizziness, Disp: 10 tablet, Rfl: 0    multivitamin (THERAGRAN) TABS, Take 1 tablet by mouth daily, Disp: , Rfl:     pimecrolimus (Elidel) 1 % cream, Apply topically 2 (two) times a day, Disp: 30 g, Rfl: 2    RABEprazole (ACIPHEX) 20 MG tablet, Take 20 mg by mouth daily, Disp: , Rfl:     vitamin B-12 (VITAMIN B-12) 1,000 mcg tablet, Take by mouth daily, Disp: , Rfl:     Review of Systems   Constitutional: Negative for appetite change, diaphoresis, fatigue and fever  Respiratory: Negative for cough, shortness of breath and wheezing  Cardiovascular: Negative for chest pain, palpitations and leg swelling  Gastrointestinal: Positive for abdominal pain  Negative for blood in stool, constipation, diarrhea, nausea and vomiting  Genitourinary: Negative for dysuria, frequency, hematuria and urgency  Musculoskeletal:        As per HPI   Skin: Negative for rash  Neurological: Negative for dizziness, weakness, numbness and headaches  Objective:    Vitals:    08/17/22 1514   BP: 118/80   Pulse: 67   Resp: 16   Weight: 63 5 kg (140 lb)   Height: 5' 3 25" (1 607 m)        Physical Exam  Constitutional:       General: She is not in acute distress  Cardiovascular:      Rate and Rhythm: Normal rate and regular rhythm        Heart sounds: Normal heart sounds  Pulmonary:      Effort: Pulmonary effort is normal       Breath sounds: Normal breath sounds  No wheezing, rhonchi or rales  Abdominal:      Tenderness: There is abdominal tenderness in the left lower quadrant  There is no right CVA tenderness, left CVA tenderness, guarding or rebound  Musculoskeletal:      Right lower leg: No edema  Left lower leg: No edema  Neurological:      Mental Status: She is alert and oriented to person, place, and time     Psychiatric:         Mood and Affect: Mood normal          Behavior: Behavior normal         Component 8/17/22  4:01 PM    LEUKOCYTE ESTERASE,UA neg    NITRITE,UA neg    SL AMB POCT UROBILINOGEN 0 2    POCT URINE PROTEIN neg     PH,UA 5 0    BLOOD,UA trace    SPECIFIC GRAVITY,UA 1 010    KETONES,UA neg    BILIRUBIN,UA neg    GLUCOSE, UA neg     COLOR,UA Yellow    CLARITY,UA Clear

## 2022-08-18 LAB
APPEARANCE UR: CLEAR
BILIRUB UR QL STRIP: NEGATIVE
COLOR UR: YELLOW
GLUCOSE UR QL STRIP: NEGATIVE
HGB UR QL STRIP: NEGATIVE
KETONES UR QL STRIP: NEGATIVE
LEUKOCYTE ESTERASE UR QL STRIP: NEGATIVE
NITRITE UR QL STRIP: NEGATIVE
PH UR STRIP: 6 [PH] (ref 5–8)
PROT UR QL STRIP: NEGATIVE
SP GR UR STRIP: 1.01 (ref 1–1.03)

## 2022-08-19 ENCOUNTER — TELEPHONE (OUTPATIENT)
Dept: FAMILY MEDICINE CLINIC | Facility: CLINIC | Age: 62
End: 2022-08-19

## 2022-10-04 ENCOUNTER — OFFICE VISIT (OUTPATIENT)
Dept: FAMILY MEDICINE CLINIC | Facility: CLINIC | Age: 62
End: 2022-10-04
Payer: COMMERCIAL

## 2022-10-04 VITALS
HEIGHT: 63 IN | RESPIRATION RATE: 16 BRPM | TEMPERATURE: 98.6 F | OXYGEN SATURATION: 98 % | SYSTOLIC BLOOD PRESSURE: 120 MMHG | HEART RATE: 76 BPM | BODY MASS INDEX: 25.04 KG/M2 | DIASTOLIC BLOOD PRESSURE: 78 MMHG | WEIGHT: 141.3 LBS

## 2022-10-04 DIAGNOSIS — J01.00 ACUTE NON-RECURRENT MAXILLARY SINUSITIS: Primary | ICD-10-CM

## 2022-10-04 PROCEDURE — 99213 OFFICE O/P EST LOW 20 MIN: CPT | Performed by: PHYSICIAN ASSISTANT

## 2022-10-04 RX ORDER — AMOXICILLIN 500 MG/1
500 CAPSULE ORAL EVERY 8 HOURS SCHEDULED
Qty: 30 CAPSULE | Refills: 0 | Status: SHIPPED | OUTPATIENT
Start: 2022-10-04 | End: 2022-10-14

## 2022-10-04 NOTE — PROGRESS NOTES
Assessment/Plan:    1  Acute non-recurrent maxillary sinusitis    - amoxil 500 mg 1 tab 3 x a day for 10 days, flonase, fluids, rest    F/u as needed      Subjective:   Chief Complaint   Patient presents with    Earache     L    Nasal Congestion    Headache      Patient ID: Lorne Caceres is a 57 y o  female  Patient here complaining of left ear pain, got worse over the weekend, put some drops into the ear and now dripping into throat, hurts when swallows  Stuffy nose  Denies cough,f ever, chills  The following portions of the patient's history were reviewed and updated as appropriate: allergies, current medications, past family history, past medical history, past social history, past surgical history and problem list     Past Medical History:   Diagnosis Date    Abnormal findings on diagnostic imaging of breast     Last assessed 13    Adjustment disorder with depressed mood     Last assessed 13    Anxiety     Arthritis     hands    Erythema migrans (Lyme disease)     Last assessed 13    GERD (gastroesophageal reflux disease)     Polyarthritis     Last assessed 16    Uterine leiomyoma     Vertigo      Past Surgical History:   Procedure Laterality Date    BREAST BIOPSY Left 2002    BREAST SURGERY      CARPAL TUNNEL RELEASE Bilateral 10/2008    DENTAL SURGERY      DILATION AND CURETTAGE OF UTERUS      HYSTEROSCOPY      w/D and C on 06 for irregular menses    Her pathology demonstrated proliferative endometrium    INDUCED       MOHS RECONSTRUCTION Right 2019    Procedure: RECONSTRUCTION MOHS DEFECT RIGHT NOSE;  Surgeon: Fela Beaver MD;  Location:  MAIN OR;  Service: Plastics    AR ADJ TISS Stevphen Bird 10 1-30 SQCM Right 2019    Procedure: FLAP LOCAL RIGHT NOSE VS FTSG;  Surgeon: Fela Beaver MD;  [facility]: [facility];  Service: Plastics    TONSILLECTOMY      WRIST SURGERY Right      Family History   Problem Relation Age of Onset    Arthritis Mother     Hypertension Mother     Coronary artery disease Mother     Skin cancer Mother     Varicose Veins Mother     Uterine cancer Mother    [employer] Arthritis Father     Hypertension Father     Stroke Father     Coronary artery disease Father     Other Father         Stent indications    Ulcerative colitis Sister     Rheum arthritis Maternal Grandmother     Substance Abuse Neg Hx     Mental illness Neg Hx      Social History     Socioeconomic History    Marital status: /Civil Union     Spouse name: Not on file    Number of children: Not on file    Years of education: Not on file    Highest education level: Not on file   Occupational History    Not on file   Tobacco Use    Smoking status: Never Smoker    Smokeless tobacco: Never Used   Vaping Use    Vaping Use: Never used   Substance and Sexual Activity    Alcohol use: Yes     Comment: social- 2-3 glasses of wine    Drug use: No    Sexual activity: Not on file   Other Topics Concern    Not on file   Social History Narrative    Caffeine Use 1 cup per day    Per allscripts: Denied: History of [facility]     Social Determinants of Health     Financial Resource Strain: Not on file   Food Insecurity: Not on file   Transportation Needs: Not on file   Physical Activity: Not on file   Stress: Not on file   Social Connections: Not on file   Intimate Partner Violence: Not on file   Housing Stability: Not on file       Current Outpatient Medications:     albuterol (VENTOLIN HFA) 90 mcg/act inhaler, Inhale 2 puffs every 6 (six) hours as needed for wheezing or shortness of breath, Disp: 3 each, Rfl: 0    Cholecalciferol (VITAMIN D3) 1000 units CHEW, Chew, Disp: , Rfl:     fluticasone (FLONASE) 50 mcg/act nasal spray, USE 2 SPRAYS IN EACH NOSTRIL DAILY, Disp: 48 g, Rfl: 3    hydrochlorothiazide (HYDRODIURIL) 25 mg tablet, TAKE 1 TABLET DAILY, Disp: 90 tablet, Rfl: 3    Ibuprofen 200 MG CAPS, Take 2 capsules by mouth 2 (two) times a day, Disp: , Rfl:     meclizine (ANTIVERT) 25 mg tablet, Take 1 tablet by mouth every 8 (eight) hours as needed for dizziness, Disp: 10 tablet, Rfl: 0    multivitamin (THERAGRAN) TABS, Take 1 tablet by mouth daily, Disp: , Rfl:     pimecrolimus (Elidel) 1 % cream, Apply topically 2 (two) times a day, Disp: 30 g, Rfl: 2    RABEprazole (ACIPHEX) 20 MG tablet, Take 20 mg by mouth daily, Disp: , Rfl:     vitamin B-12 (VITAMIN B-12) 1,000 mcg tablet, Take by mouth daily, Disp: , Rfl:     Review of Systems          Objective:    Vitals:    10/04/22 1626   BP: 120/78   Pulse: 76   Resp: 16   Temp: 98 6 °F (37 °C)   SpO2: 98%   Weight: 64 1 kg (141 lb 4 8 oz)   Height: 5' 3 25" (1 607 m)        Physical Exam      Constitutional: Patient is oriented to person, place, and time  appears well-developed and well-nourished  HENT:   Head: Normocephalic and atraumatic  Right Ear: Tympanic membrane, external ear and ear canal normal    Left Ear: Tympanic membrane, external ear and ear canal normal    Nose: Mucosal edema present  Mouth/Throat: Posterior oropharyngeal erythema present  Turbinates inflamed with purulent mucus, pharynx with purulent post nasal drip and erythema   Eyes: Conjunctivae are normal    Neck: Neck supple  Cardiovascular: Normal rate, regular rhythm and normal heart sounds  Pulmonary/Chest: Effort normal and breath sounds normal    Lymphadenopathy: no cervical adenopathy  Neurological: Patient is alert and oriented to person, place, and time  Skin: Skin is warm  Psychiatric: Patient has a normal mood and affect

## 2022-11-08 ENCOUNTER — OFFICE VISIT (OUTPATIENT)
Dept: FAMILY MEDICINE CLINIC | Facility: CLINIC | Age: 62
End: 2022-11-08

## 2022-11-08 VITALS
DIASTOLIC BLOOD PRESSURE: 80 MMHG | RESPIRATION RATE: 16 BRPM | WEIGHT: 141.8 LBS | HEART RATE: 75 BPM | SYSTOLIC BLOOD PRESSURE: 120 MMHG | HEIGHT: 63 IN | BODY MASS INDEX: 25.12 KG/M2

## 2022-11-08 DIAGNOSIS — K21.9 GASTROESOPHAGEAL REFLUX DISEASE, UNSPECIFIED WHETHER ESOPHAGITIS PRESENT: ICD-10-CM

## 2022-11-08 DIAGNOSIS — Z12.31 ENCOUNTER FOR SCREENING MAMMOGRAM FOR MALIGNANT NEOPLASM OF BREAST: ICD-10-CM

## 2022-11-08 DIAGNOSIS — J31.0 CHRONIC RHINITIS: ICD-10-CM

## 2022-11-08 DIAGNOSIS — J45.20 MILD INTERMITTENT ASTHMA WITHOUT COMPLICATION: ICD-10-CM

## 2022-11-08 DIAGNOSIS — M06.09 RHEUMATOID ARTHRITIS OF MULTIPLE SITES WITH NEGATIVE RHEUMATOID FACTOR (HCC): ICD-10-CM

## 2022-11-08 DIAGNOSIS — I10 ESSENTIAL HYPERTENSION: Primary | ICD-10-CM

## 2022-11-10 NOTE — PROGRESS NOTES
Assessment/Plan:     Diagnoses and all orders for this visit:    Essential hypertension  -     Lipid Panel with Direct LDL reflex; Future  -     Comprehensive metabolic panel; Future  -     CBC and differential; Future  -     TSH, 3rd generation with Free T4 reflex; Future  -     UA (URINE) with reflex to Scope; Future    - BP is well controlled on HCTZ 25 mg daily, will recheck lab work prior to next visit    Gastroesophageal reflux disease  - patient is doing well on Aciphex 20 mg daily and dietary changes, continue regular follow up with GI    Rheumatoid arthritis of multiple sites  - follow up with Rheumatology    Chronic rhinitis  - stable on Flonase as needed    Mild intermittent asthma   - continue Albuterol inhaler as needed, uses rarely    Encounter for screening mammogram for malignant neoplasm of breast  -     Mammo screening bilateral w 3d & cad; Future        Return in 6 months or sooner as needed  The patient understands and agrees with the treatment plan  Subjective:   Chief Complaint   Patient presents with   • Hypertension      Patient ID: Eddie Lerner is a 57 y o  female      HPI    The following portions of the patient's history were reviewed and updated as appropriate: allergies, current medications, past family history, past medical history, past social history, past surgical history and problem list     Past Medical History:   Diagnosis Date   • Abnormal findings on diagnostic imaging of breast     Last assessed 08/16/13   • Adjustment disorder with depressed mood     Last assessed 05/17/13   • Anxiety    • Arthritis     hands   • Erythema migrans (Lyme disease)     Last assessed 06/29/13   • GERD (gastroesophageal reflux disease)    • Polyarthritis     Last assessed 02/17/16   • Uterine leiomyoma    • Vertigo      Past Surgical History:   Procedure Laterality Date   • BREAST BIOPSY Left 02/2002   • BREAST SURGERY     • CARPAL TUNNEL RELEASE Bilateral 10/2008   • DENTAL SURGERY     • DILATION AND CURETTAGE OF UTERUS     • HYSTEROSCOPY      w/D and C on 06 for irregular menses    Her pathology demonstrated proliferative endometrium   • INDUCED      • MOHS RECONSTRUCTION Right 2019    Procedure: RECONSTRUCTION MOHS DEFECT RIGHT NOSE;  Surgeon: Ronni Melendrez MD;  Location:  MAIN OR;  Service: Plastics   • [place] ADJ ONEL [place] 10 1-30 SQCM Right 2019    Procedure: FLAP LOCAL RIGHT NOSE VS FTSG;  Surgeon: Ronni Melendrez MD;  Location:  MAIN OR;  Service: Plastics   • TONSILLECTOMY     • WRIST SURGERY Right      Family History   Problem Relation Age of Onset   • Arthritis Mother    • Hypertension Mother    • Coronary artery disease Mother    • Skin cancer Mother    • Varicose Veins Mother    • Uterine cancer Mother    • Arthritis Father    • Hypertension Father    • Stroke Father    • Coronary artery disease Father    • Other Father         Stent indications   • Ulcerative colitis Sister    • Rheum arthritis Maternal Grandmother    • Substance Abuse Neg Hx    • Mental illness Neg Hx      Social History     Socioeconomic History   • Marital status: /Civil Union     Spouse name: Not on file   • Number of children: Not on file   • Years of education: Not on file   • Highest education level: Not on file   Occupational History   • Not on file   Tobacco Use   • Smoking status: Never Smoker   • Smokeless tobacco: Never Used   Vaping Use   • Vaping Use: Never used   Substance and Sexual Activity   • Alcohol use: Yes     Comment: social- 2-3 glasses of wine   • Drug use: No   • Sexual activity: Not on file   Other Topics Concern   • Not on file   Social History Narrative    Caffeine Use 1 cup per day    Per allscripts: Denied: History of [place]    [employer]     Social Determinants of Health     Financial Resource Strain: Not on file   Food Insecurity: Not on file   Transportation Needs: Not on file   Physical Activity: Not on file   Stress: Not on file   Social Connections: Not on file   Intimate Partner Violence: Not on file   Housing Stability: Not on file       Current Outpatient Medications:   •  albuterol (VENTOLIN HFA) 90 mcg/act inhaler, Inhale 2 puffs every 6 (six) hours as needed for wheezing or shortness of breath, Disp: 3 each, Rfl: 0  •  Cholecalciferol (VITAMIN D3) 1000 units CHEW, Chew, Disp: , Rfl:   •  fluticasone (FLONASE) 50 mcg/act nasal spray, USE 2 SPRAYS IN EACH NOSTRIL DAILY, Disp: 48 g, Rfl: 3  •  hydrochlorothiazide (HYDRODIURIL) 25 mg tablet, TAKE 1 TABLET DAILY, Disp: 90 tablet, Rfl: 3  •  Ibuprofen 200 MG CAPS, Take 2 capsules by mouth 2 (two) times a day, Disp: , Rfl:   •  meclizine (ANTIVERT) 25 mg tablet, Take 1 tablet by mouth every 8 (eight) hours as needed for dizziness, Disp: 10 tablet, Rfl: 0  •  multivitamin (THERAGRAN) TABS, Take 1 tablet by mouth daily, Disp: , Rfl:   •  RABEprazole (ACIPHEX) 20 MG tablet, Take 20 mg by mouth daily, Disp: , Rfl:   •  vitamin B-12 (VITAMIN B-12) 1,000 mcg tablet, Take by mouth daily, Disp: , Rfl:     Review of Systems   Constitutional: Negative for appetite change, diaphoresis, fatigue and fever  HENT: Negative for congestion and sore throat  Respiratory: Negative for cough, shortness of breath and wheezing  Cardiovascular: Negative for chest pain, palpitations and leg swelling  Gastrointestinal: Negative for abdominal pain, blood in stool, diarrhea, nausea and vomiting  Genitourinary: Negative for dysuria, frequency, hematuria, pelvic pain and urgency  Musculoskeletal:        Arthralgias hands, feet   Neurological: Negative for dizziness, syncope, weakness, numbness and headaches  Hematological: Negative for adenopathy  Psychiatric/Behavioral: Negative for dysphoric mood and sleep disturbance  The patient is not nervous/anxious            Objective:    Vitals:    11/08/22 1558   BP: 120/80   Pulse: 75   Resp: 16   Weight: 64 3 kg (141 lb 12 8 oz)   Height: 5' 3 25" (1 607 m)     Wt Readings from Last 3 Encounters:   11/08/22 64 3 kg (141 lb 12 8 oz)   10/04/22 64 1 kg (141 lb 4 8 oz)   08/17/22 63 5 kg (140 lb)     BP Readings from Last 3 Encounters:   11/08/22 120/80   10/04/22 120/78   08/17/22 118/80        Physical Exam  Constitutional:       General: She is not in acute distress  Appearance: She is well-developed  Neck:      Thyroid: No thyromegaly  Cardiovascular:      Rate and Rhythm: Normal rate and regular rhythm  Heart sounds: Normal heart sounds  No murmur heard  Pulmonary:      Effort: Pulmonary effort is normal  No respiratory distress  Breath sounds: Normal breath sounds  Abdominal:      General: There is no distension  Palpations: Abdomen is soft  There is no mass  Tenderness: There is no abdominal tenderness  Musculoskeletal:      Cervical back: Neck supple  Right lower leg: No edema  Left lower leg: No edema  Lymphadenopathy:      Cervical: No cervical adenopathy  Neurological:      Mental Status: She is alert and oriented to person, place, and time  Psychiatric:         Mood and Affect: Mood normal          Behavior: Behavior normal          Thought Content:  Thought content normal         Lab Results   Component Value Date    WBC 4 1 04/23/2022    HGB 14 1 04/23/2022    HCT 41 7 04/23/2022    MCV 99 3 04/23/2022     04/23/2022     Lab Results   Component Value Date     07/01/2017    SODIUM 136 08/17/2022    K 3 6 08/17/2022     08/17/2022    CO2 30 08/17/2022    AGAP 4 08/17/2022    BUN 8 08/17/2022    CREATININE 0 80 08/17/2022    GLUC 87 04/23/2022    GLUF 95 08/17/2022    CALCIUM 9 8 08/17/2022    AST 22 04/23/2022    ALT 17 04/23/2022    ALKPHOS 85 04/23/2022    PROT 6 6 07/01/2017    TP 7 0 04/23/2022    BILITOT 0 6 07/01/2017    TBILI 0 7 04/23/2022    EGFR 78 08/17/2022

## 2022-11-25 DIAGNOSIS — J45.20 MILD INTERMITTENT ASTHMA WITHOUT COMPLICATION: ICD-10-CM

## 2022-11-25 RX ORDER — ALBUTEROL SULFATE 90 UG/1
2 AEROSOL, METERED RESPIRATORY (INHALATION) EVERY 6 HOURS PRN
Qty: 18 G | Refills: 1 | Status: SHIPPED | OUTPATIENT
Start: 2022-11-25

## 2023-04-08 LAB
ALBUMIN SERPL-MCNC: 4.6 G/DL (ref 3.6–5.1)
ALBUMIN/GLOB SERPL: 2.1 (CALC) (ref 1–2.5)
ALP SERPL-CCNC: 75 U/L (ref 37–153)
ALT SERPL-CCNC: 15 U/L (ref 6–29)
APPEARANCE UR: CLEAR
AST SERPL-CCNC: 22 U/L (ref 10–35)
BASOPHILS # BLD AUTO: 40 CELLS/UL (ref 0–200)
BASOPHILS NFR BLD AUTO: 1.2 %
BILIRUB SERPL-MCNC: 0.8 MG/DL (ref 0.2–1.2)
BILIRUB UR QL STRIP: NEGATIVE
BUN SERPL-MCNC: 12 MG/DL (ref 7–25)
BUN/CREAT SERPL: ABNORMAL (CALC) (ref 6–22)
CALCIUM SERPL-MCNC: 9.6 MG/DL (ref 8.6–10.4)
CHLORIDE SERPL-SCNC: 99 MMOL/L (ref 98–110)
CHOLEST SERPL-MCNC: 223 MG/DL
CHOLEST/HDLC SERPL: 1.9 (CALC)
CO2 SERPL-SCNC: 33 MMOL/L (ref 20–32)
COLOR UR: YELLOW
CREAT SERPL-MCNC: 0.8 MG/DL (ref 0.5–1.05)
EOSINOPHIL # BLD AUTO: 132 CELLS/UL (ref 15–500)
EOSINOPHIL NFR BLD AUTO: 4 %
ERYTHROCYTE [DISTWIDTH] IN BLOOD BY AUTOMATED COUNT: 10.9 % (ref 11–15)
GFR/BSA.PRED SERPLBLD CYS-BASED-ARV: 82 ML/MIN/1.73M2
GLOBULIN SER CALC-MCNC: 2.2 G/DL (CALC) (ref 1.9–3.7)
GLUCOSE SERPL-MCNC: 90 MG/DL (ref 65–99)
GLUCOSE UR QL STRIP: NEGATIVE
HCT VFR BLD AUTO: 40.1 % (ref 35–45)
HDLC SERPL-MCNC: 118 MG/DL
HGB BLD-MCNC: 14.3 G/DL (ref 11.7–15.5)
HGB UR QL STRIP: NEGATIVE
KETONES UR QL STRIP: NEGATIVE
LDLC SERPL CALC-MCNC: 90 MG/DL (CALC)
LEUKOCYTE ESTERASE UR QL STRIP: NEGATIVE
LYMPHOCYTES # BLD AUTO: 1165 CELLS/UL (ref 850–3900)
LYMPHOCYTES NFR BLD AUTO: 35.3 %
MCH RBC QN AUTO: 34.9 PG (ref 27–33)
MCHC RBC AUTO-ENTMCNC: 35.7 G/DL (ref 32–36)
MCV RBC AUTO: 97.8 FL (ref 80–100)
MONOCYTES # BLD AUTO: 363 CELLS/UL (ref 200–950)
MONOCYTES NFR BLD AUTO: 11 %
NEUTROPHILS # BLD AUTO: 1601 CELLS/UL (ref 1500–7800)
NEUTROPHILS NFR BLD AUTO: 48.5 %
NITRITE UR QL STRIP: NEGATIVE
NONHDLC SERPL-MCNC: 105 MG/DL (CALC)
PH UR STRIP: 8 [PH] (ref 5–8)
PLATELET # BLD AUTO: 247 THOUSAND/UL (ref 140–400)
PMV BLD REES-ECKER: 9.6 FL (ref 7.5–12.5)
POTASSIUM SERPL-SCNC: 3.8 MMOL/L (ref 3.5–5.3)
PROT SERPL-MCNC: 6.8 G/DL (ref 6.1–8.1)
PROT UR QL STRIP: NEGATIVE
RBC # BLD AUTO: 4.1 MILLION/UL (ref 3.8–5.1)
SODIUM SERPL-SCNC: 138 MMOL/L (ref 135–146)
SP GR UR STRIP: 1.01 (ref 1–1.03)
TRIGL SERPL-MCNC: 66 MG/DL
TSH SERPL-ACNC: 0.83 MIU/L (ref 0.4–4.5)
WBC # BLD AUTO: 3.3 THOUSAND/UL (ref 3.8–10.8)

## 2023-04-18 ENCOUNTER — PREP FOR PROCEDURE (OUTPATIENT)
Dept: PLASTIC SURGERY | Facility: CLINIC | Age: 63
End: 2023-04-18

## 2023-04-18 DIAGNOSIS — C44.310 BASAL CELL CARCINOMA, FACE: Primary | ICD-10-CM

## 2023-05-08 ENCOUNTER — OFFICE VISIT (OUTPATIENT)
Dept: FAMILY MEDICINE CLINIC | Facility: CLINIC | Age: 63
End: 2023-05-08

## 2023-05-08 VITALS
WEIGHT: 139.8 LBS | BODY MASS INDEX: 22.47 KG/M2 | DIASTOLIC BLOOD PRESSURE: 82 MMHG | HEART RATE: 78 BPM | HEIGHT: 66 IN | RESPIRATION RATE: 16 BRPM | SYSTOLIC BLOOD PRESSURE: 122 MMHG

## 2023-05-08 DIAGNOSIS — J31.0 CHRONIC RHINITIS: ICD-10-CM

## 2023-05-08 DIAGNOSIS — K21.9 GASTROESOPHAGEAL REFLUX DISEASE, UNSPECIFIED WHETHER ESOPHAGITIS PRESENT: ICD-10-CM

## 2023-05-08 DIAGNOSIS — M81.0 OSTEOPOROSIS WITHOUT CURRENT PATHOLOGICAL FRACTURE, UNSPECIFIED OSTEOPOROSIS TYPE: ICD-10-CM

## 2023-05-08 DIAGNOSIS — Z78.0 POSTMENOPAUSAL: ICD-10-CM

## 2023-05-08 DIAGNOSIS — M06.09 RHEUMATOID ARTHRITIS OF MULTIPLE SITES WITH NEGATIVE RHEUMATOID FACTOR (HCC): ICD-10-CM

## 2023-05-08 DIAGNOSIS — D72.819 LEUKOPENIA, UNSPECIFIED TYPE: ICD-10-CM

## 2023-05-08 DIAGNOSIS — I10 ESSENTIAL HYPERTENSION: Primary | ICD-10-CM

## 2023-05-08 DIAGNOSIS — C44.310 BASAL CELL CARCINOMA, FACE: ICD-10-CM

## 2023-05-08 DIAGNOSIS — Z13.820 SCREENING FOR OSTEOPOROSIS: ICD-10-CM

## 2023-05-08 RX ORDER — HYDROCHLOROTHIAZIDE 25 MG/1
25 TABLET ORAL DAILY
Qty: 90 TABLET | Refills: 3 | Status: SHIPPED | OUTPATIENT
Start: 2023-05-08

## 2023-05-08 RX ORDER — FLUTICASONE PROPIONATE 50 MCG
2 SPRAY, SUSPENSION (ML) NASAL DAILY
Qty: 48 G | Refills: 2 | Status: SHIPPED | OUTPATIENT
Start: 2023-05-08

## 2023-05-10 NOTE — PROGRESS NOTES
Assessment/Plan:    Essential hypertension  - well controlled on HCTZ 25 mg daily  - hydrochlorothiazide (HYDRODIURIL) 25 mg tablet; Take 1 tablet (25 mg total) by mouth daily  Dispense: 90 tablet; Refill: 3  - Basic metabolic panel; Future    Gastroesophageal reflux disease  - stable on Aciphex, follows with GI    Basal cell carcinoma, face  - follow up with Dermatology and Plastic surgery    Rheumatoid arthritis   - follow up with Rheumatology as scheduled    Chronic rhinitis  - stable, continue Flonase as needed  - fluticasone (FLONASE) 50 mcg/act nasal spray; 2 sprays into each nostril daily  Dispense: 48 g; Refill: 2    Leukopenia  - will recheck CBC&diff prior to next visit  - CBC and differential; Future    Osteoporosis  - continue calcium/ vitamin D supplements, discussed regular weight bearing exercise, DEXA scan is due and ordered  - DXA bone density spine hip and pelvis; Future         Return in 6 months or sooner as needed  The patient understands and agrees with the treatment plan  Subjective:   Chief Complaint   Patient presents with   • Hypertension      Patient ID: Celia Campos is a 70 y o  female who presents today for follow up hypertension and review her lab results  Patient recently had skin lesion removed just above her left eyebrow by Dermatology with pathology revealing a nodular basal cell carcinoma and is scheduled for Mohs procedure this month  Patient offers no other new complaints           The following portions of the patient's history were reviewed and updated as appropriate: allergies, current medications, past family history, past medical history, past social history, past surgical history and problem list     Past Medical History:   Diagnosis Date   • Abnormal findings on diagnostic imaging of breast     Last assessed 08/16/13   • Adjustment disorder with depressed mood     Last assessed 05/17/13   • Anxiety    • Arthritis     hands   • Erythema migrans (Lyme disease) Last assessed 13   • GERD (gastroesophageal reflux disease)    • Polyarthritis     Last assessed 16   • Uterine leiomyoma    • Vertigo      Past Surgical History:   Procedure Laterality Date   • BREAST BIOPSY Left 2002   • BREAST SURGERY     • CARPAL TUNNEL RELEASE Bilateral 10/2008   • DENTAL SURGERY     • DILATION AND CURETTAGE OF UTERUS     • HYSTEROSCOPY      w/D and C on 06 for irregular menses    Her pathology demonstrated proliferative endometrium   • INDUCED      • MOHS RECONSTRUCTION Right 2019    Procedure: RECONSTRUCTION MOHS DEFECT RIGHT NOSE;  Surgeon: Ivania Lassiter MD;  Location:  MAIN OR;  Service: Plastics   • NV ADJT TIS REARGMT EYE/NOSE/EAR/LIP 10 1-30 0 SQCM Right 2019    Procedure: FLAP LOCAL RIGHT NOSE VS FTSG;  Surgeon: Ivania Lassiter MD;  Location:  MAIN OR;  Service: Plastics   • SKIN BIOPSY     • TONSILLECTOMY     • WRIST SURGERY Right      Family History   Problem Relation Age of Onset   • Arthritis Mother    • Hypertension Mother    • Coronary artery disease Mother    • Skin cancer Mother    • Varicose Veins Mother    • Uterine cancer Mother    • Arthritis Father    • Hypertension Father    • Stroke Father    • Coronary artery disease Father    • Other Father         Stent indications   • Ulcerative colitis Sister    • Rheum arthritis Maternal Grandmother    • Substance Abuse Neg Hx    • Mental illness Neg Hx      Social History     Socioeconomic History   • Marital status: /Civil Union     Spouse name: Not on file   • Number of children: Not on file   • Years of education: Not on file   • Highest education level: Not on file   Occupational History   • Not on file   Tobacco Use   • Smoking status: Never   • Smokeless tobacco: Never   Vaping Use   • Vaping Use: Never used   Substance and Sexual Activity   • Alcohol use: Yes     Comment: social- 2-3 glasses of wine   • Drug use: No   • Sexual activity: Not on file   Other Topics Concern   • Not on file   Social History Narrative    Caffeine Use 1 cup per day    Per allscripts: Denied: History of [facility]     Social Determinants of Health     Financial Resource Strain: Not on file   Food Insecurity: Not on file   Transportation Needs: Not on file   Physical Activity: Not on file   Stress: Not on file   Social Connections: Not on file   Intimate Partner Violence: Not on file   Housing Stability: Not on file       Current Outpatient Medications:   •  albuterol (Ventolin HFA) 90 mcg/act inhaler, Inhale 2 puffs every 6 (six) hours as needed for wheezing or shortness of breath, Disp: 18 g, Rfl: 1  •  Cholecalciferol (VITAMIN D3) 1000 units CHEW, Chew, Disp: , Rfl:   •  fluticasone (FLONASE) 50 mcg/act nasal spray, 2 sprays into each nostril daily, Disp: 48 g, Rfl: 2  •  hydrochlorothiazide (HYDRODIURIL) 25 mg tablet, Take 1 tablet (25 mg total) by mouth daily, Disp: 90 tablet, Rfl: 3  •  Ibuprofen 200 MG CAPS, Take 2 capsules by mouth 2 (two) times a day, Disp: , Rfl:   •  meclizine (ANTIVERT) 25 mg tablet, Take 1 tablet by mouth every 8 (eight) hours as needed for dizziness, Disp: 10 tablet, Rfl: 0  •  multivitamin (THERAGRAN) TABS, Take 1 tablet by mouth daily, Disp: , Rfl:   •  RABEprazole (ACIPHEX) 20 MG tablet, Take 20 mg by mouth daily, Disp: , Rfl:   •  vitamin B-12 (VITAMIN B-12) 1,000 mcg tablet, Take by mouth daily, Disp: , Rfl:     Review of Systems   Constitutional: Negative for appetite change, diaphoresis, fatigue and fever  HENT: Negative for congestion and sore throat  Respiratory: Negative for cough, shortness of breath and wheezing  Cardiovascular: Negative for chest pain, palpitations and leg swelling  Gastrointestinal: Negative for abdominal pain, blood in stool, diarrhea, nausea and vomiting  Genitourinary: Negative for dysuria, frequency, hematuria, pelvic pain and urgency  Musculoskeletal: Positive for arthralgias     Neurological: Negative for "dizziness, syncope, weakness, numbness and headaches  Hematological: Negative for adenopathy  Psychiatric/Behavioral: Negative for dysphoric mood and sleep disturbance  The patient is not nervous/anxious  Objective:    Vitals:    05/08/23 1609 05/08/23 1626   BP: 130/90 122/82   BP Location:  Left arm   Patient Position:  Sitting   Cuff Size:  Standard   Pulse: 78    Resp: 16    Weight: 63 4 kg (139 lb 12 8 oz)    Height: 5' 5 5\" (1 664 m)      Wt Readings from Last 3 Encounters:   05/08/23 63 4 kg (139 lb 12 8 oz)   04/12/23 65 8 kg (145 lb)   11/08/22 64 3 kg (141 lb 12 8 oz)     BP Readings from Last 3 Encounters:   05/08/23 122/82   04/12/23 118/85   11/08/22 120/80      Physical Exam  Constitutional:       General: She is not in acute distress  Appearance: She is well-developed  Neck:      Thyroid: No thyromegaly  Cardiovascular:      Rate and Rhythm: Normal rate and regular rhythm  Heart sounds: Normal heart sounds  No murmur heard  Pulmonary:      Effort: Pulmonary effort is normal  No respiratory distress  Breath sounds: Normal breath sounds  Abdominal:      General: There is no distension  Palpations: Abdomen is soft  There is no mass  Tenderness: There is no abdominal tenderness  Musculoskeletal:      Cervical back: Neck supple  Right lower leg: No edema  Left lower leg: No edema  Lymphadenopathy:      Cervical: No cervical adenopathy  Neurological:      Mental Status: She is alert and oriented to person, place, and time  Psychiatric:         Mood and Affect: Mood normal          Behavior: Behavior normal          Thought Content:  Thought content normal           Lab Results   Component Value Date    WBC 3 3 (L) 04/07/2023    HGB 14 3 04/07/2023    HCT 40 1 04/07/2023    MCV 97 8 04/07/2023     04/07/2023     Lab Results   Component Value Date    UMB0UXRYCAUC 1 719 11/14/2017     Lab Results   Component Value Date    CHOLESTEROL 223 (H) " 04/07/2023    CHOLESTEROL 197 04/24/2021    CHOLESTEROL 205 (H) 12/08/2018     Lab Results   Component Value Date     04/07/2023    HDL 97 04/24/2021     12/08/2018     Lab Results   Component Value Date    TRIG 66 04/07/2023    TRIG 70 04/24/2021    TRIG 78 12/08/2018     Lab Results   Component Value Date    LDLCALC 90 04/07/2023     Lab Results   Component Value Date     07/01/2017    SODIUM 138 04/07/2023    K 3 8 04/07/2023    CL 99 04/07/2023    CO2 33 (H) 04/07/2023    AGAP 4 08/17/2022    BUN 12 04/07/2023    CREATININE 0 80 04/07/2023    GLUC 90 04/07/2023    GLUF 95 08/17/2022    CALCIUM 9 6 04/07/2023    AST 22 04/07/2023    ALT 15 04/07/2023    ALKPHOS 75 04/07/2023    PROT 6 6 07/01/2017    TP 6 8 04/07/2023    BILITOT 0 6 07/01/2017    TBILI 0 8 04/07/2023    EGFR 82 04/07/2023

## 2023-05-12 ENCOUNTER — ANESTHESIA EVENT (OUTPATIENT)
Dept: PERIOP | Facility: AMBULARY SURGERY CENTER | Age: 63
End: 2023-05-12

## 2023-05-19 NOTE — H&P
Assessment/Plan: Please see HPI  We discussed reconstruction of the anticipated Mohs defect of the left brow forehead, this likely will be performed utilizing local tissue rearrangement, potentially in a vertically oriented fashion to avoid elevation/distortion of the medial brow  We talked about the procedure, how will be performed, as well as potential risk, complications and limitations  Her preference would be to have this performed with general anesthesia  She will work with our coordinator regarding timing of the procedure  There are no diagnoses linked to this encounter  Subjective: Basal cell carcinoma     Patient ID: Catracho Garcia is a 70 y o  female  HPI Jef Olmstead is a pleasant 38-year-old female (previous patient, Mohs reconstruction right nasal ala), referred by Dr Julia Phillips for discussion regarding reconstruction options for an anticipated Mohs defect of the left brow/forehead where she has a nodular basal cell carcinoma  The following portions of the patient's history were reviewed and updated as appropriate: allergies, current medications, past family history, past medical history, past social history, past surgical history and problem list     Review of Systems   Constitutional: Negative for chills and fever  HENT: Negative for hearing loss  Eyes: Positive for visual disturbance  Negative for discharge  Respiratory: Negative for chest tightness and shortness of breath  Cardiovascular: Negative for chest pain and leg swelling  Gastrointestinal: Negative for blood in stool, constipation, diarrhea and nausea  Genitourinary: Negative for dysuria  Musculoskeletal: Negative for gait problem  Skin: Negative for rash  Allergic/Immunologic: Negative for immunocompromised state  Neurological: Negative for seizures and headaches  Hematological: Does not bruise/bleed easily  Psychiatric/Behavioral: Negative for dysphoric mood  The patient is not nervous/anxious  Objective:      LMP  (LMP Unknown)          Physical Exam  Constitutional:       Appearance: Normal appearance  HENT:      Head: Normocephalic and atraumatic  Eyes:      Extraocular Movements: Extraocular movements intact  Pupils: Pupils are equal, round, and reactive to light  Cardiovascular:      Rate and Rhythm: Normal rate  Pulmonary:      Effort: Pulmonary effort is normal    Abdominal:      Palpations: Abdomen is soft  Musculoskeletal:         General: Normal range of motion  Cervical back: Normal range of motion and neck supple  Skin:     General: Skin is warm  Neurological:      Mental Status: She is alert and oriented to person, place, and time     Psychiatric:         Mood and Affect: Mood normal

## 2023-05-20 ENCOUNTER — OFFICE VISIT (OUTPATIENT)
Dept: LAB | Facility: CLINIC | Age: 63
End: 2023-05-20

## 2023-05-20 ENCOUNTER — APPOINTMENT (OUTPATIENT)
Dept: LAB | Facility: CLINIC | Age: 63
End: 2023-05-20

## 2023-05-20 DIAGNOSIS — C44.310 BASAL CELL CARCINOMA, FACE: ICD-10-CM

## 2023-05-20 LAB
ANION GAP SERPL CALCULATED.3IONS-SCNC: 6 MMOL/L (ref 4–13)
BASOPHILS # BLD AUTO: 0.06 THOUSANDS/ÂΜL (ref 0–0.1)
BASOPHILS NFR BLD AUTO: 2 % (ref 0–1)
BUN SERPL-MCNC: 13 MG/DL (ref 5–25)
CALCIUM SERPL-MCNC: 9.3 MG/DL (ref 8.4–10.2)
CHLORIDE SERPL-SCNC: 101 MMOL/L (ref 96–108)
CO2 SERPL-SCNC: 32 MMOL/L (ref 21–32)
CREAT SERPL-MCNC: 0.79 MG/DL (ref 0.6–1.3)
EOSINOPHIL # BLD AUTO: 0.09 THOUSAND/ÂΜL (ref 0–0.61)
EOSINOPHIL NFR BLD AUTO: 3 % (ref 0–6)
ERYTHROCYTE [DISTWIDTH] IN BLOOD BY AUTOMATED COUNT: 11.5 % (ref 11.6–15.1)
GFR SERPL CREATININE-BSD FRML MDRD: 78 ML/MIN/1.73SQ M
GLUCOSE P FAST SERPL-MCNC: 98 MG/DL (ref 65–99)
HCT VFR BLD AUTO: 41.2 % (ref 34.8–46.1)
HGB BLD-MCNC: 14.1 G/DL (ref 11.5–15.4)
IMM GRANULOCYTES # BLD AUTO: 0.01 THOUSAND/UL (ref 0–0.2)
IMM GRANULOCYTES NFR BLD AUTO: 0 % (ref 0–2)
LYMPHOCYTES # BLD AUTO: 1.11 THOUSANDS/ÂΜL (ref 0.6–4.47)
LYMPHOCYTES NFR BLD AUTO: 31 % (ref 14–44)
MCH RBC QN AUTO: 35.4 PG (ref 26.8–34.3)
MCHC RBC AUTO-ENTMCNC: 34.2 G/DL (ref 31.4–37.4)
MCV RBC AUTO: 104 FL (ref 82–98)
MONOCYTES # BLD AUTO: 0.38 THOUSAND/ÂΜL (ref 0.17–1.22)
MONOCYTES NFR BLD AUTO: 11 % (ref 4–12)
NEUTROPHILS # BLD AUTO: 1.91 THOUSANDS/ÂΜL (ref 1.85–7.62)
NEUTS SEG NFR BLD AUTO: 53 % (ref 43–75)
NRBC BLD AUTO-RTO: 0 /100 WBCS
PLATELET # BLD AUTO: 238 THOUSANDS/UL (ref 149–390)
PMV BLD AUTO: 9.3 FL (ref 8.9–12.7)
POTASSIUM SERPL-SCNC: 3.9 MMOL/L (ref 3.5–5.3)
RBC # BLD AUTO: 3.98 MILLION/UL (ref 3.81–5.12)
SODIUM SERPL-SCNC: 139 MMOL/L (ref 135–147)
WBC # BLD AUTO: 3.56 THOUSAND/UL (ref 4.31–10.16)

## 2023-05-22 LAB
ATRIAL RATE: 62 BPM
P AXIS: 31 DEGREES
PR INTERVAL: 146 MS
QRS AXIS: 46 DEGREES
QRSD INTERVAL: 74 MS
QT INTERVAL: 394 MS
QTC INTERVAL: 399 MS
T WAVE AXIS: 21 DEGREES
VENTRICULAR RATE: 62 BPM

## 2023-05-23 ENCOUNTER — ANESTHESIA (OUTPATIENT)
Dept: PERIOP | Facility: AMBULARY SURGERY CENTER | Age: 63
End: 2023-05-23

## 2023-05-23 ENCOUNTER — HOSPITAL ENCOUNTER (OUTPATIENT)
Facility: AMBULARY SURGERY CENTER | Age: 63
Setting detail: OUTPATIENT SURGERY
Discharge: HOME | End: 2023-05-23
Attending: SURGERY | Admitting: SURGERY

## 2023-05-23 VITALS
HEART RATE: 62 BPM | DIASTOLIC BLOOD PRESSURE: 70 MMHG | HEIGHT: 66 IN | SYSTOLIC BLOOD PRESSURE: 130 MMHG | RESPIRATION RATE: 20 BRPM | TEMPERATURE: 97.3 F | BODY MASS INDEX: 22.02 KG/M2 | WEIGHT: 137 LBS | OXYGEN SATURATION: 98 %

## 2023-05-23 PROBLEM — C44.310 BASAL CELL CARCINOMA, FACE: Status: ACTIVE | Noted: 2023-05-23

## 2023-05-23 RX ORDER — ONDANSETRON 2 MG/ML
INJECTION INTRAMUSCULAR; INTRAVENOUS AS NEEDED
Status: DISCONTINUED | OUTPATIENT
Start: 2023-05-23 | End: 2023-05-23

## 2023-05-23 RX ORDER — ONDANSETRON 2 MG/ML
4 INJECTION INTRAMUSCULAR; INTRAVENOUS ONCE AS NEEDED
Status: DISCONTINUED | OUTPATIENT
Start: 2023-05-23 | End: 2023-05-23 | Stop reason: HOSPADM

## 2023-05-23 RX ORDER — FENTANYL CITRATE/PF 50 MCG/ML
50 SYRINGE (ML) INJECTION
Status: DISCONTINUED | OUTPATIENT
Start: 2023-05-23 | End: 2023-05-23 | Stop reason: HOSPADM

## 2023-05-23 RX ORDER — PROMETHAZINE HYDROCHLORIDE 25 MG/ML
25 INJECTION, SOLUTION INTRAMUSCULAR; INTRAVENOUS ONCE AS NEEDED
Status: DISCONTINUED | OUTPATIENT
Start: 2023-05-23 | End: 2023-05-23 | Stop reason: HOSPADM

## 2023-05-23 RX ORDER — PROPOFOL 10 MG/ML
INJECTION, EMULSION INTRAVENOUS CONTINUOUS PRN
Status: DISCONTINUED | OUTPATIENT
Start: 2023-05-23 | End: 2023-05-23

## 2023-05-23 RX ORDER — SODIUM CHLORIDE, SODIUM LACTATE, POTASSIUM CHLORIDE, CALCIUM CHLORIDE 600; 310; 30; 20 MG/100ML; MG/100ML; MG/100ML; MG/100ML
INJECTION, SOLUTION INTRAVENOUS CONTINUOUS PRN
Status: DISCONTINUED | OUTPATIENT
Start: 2023-05-23 | End: 2023-05-23

## 2023-05-23 RX ORDER — PROPOFOL 10 MG/ML
INJECTION, EMULSION INTRAVENOUS AS NEEDED
Status: DISCONTINUED | OUTPATIENT
Start: 2023-05-23 | End: 2023-05-23

## 2023-05-23 RX ORDER — LIDOCAINE HYDROCHLORIDE 20 MG/ML
INJECTION, SOLUTION EPIDURAL; INFILTRATION; INTRACAUDAL; PERINEURAL AS NEEDED
Status: DISCONTINUED | OUTPATIENT
Start: 2023-05-23 | End: 2023-05-23

## 2023-05-23 RX ORDER — MIDAZOLAM HYDROCHLORIDE 2 MG/2ML
INJECTION, SOLUTION INTRAMUSCULAR; INTRAVENOUS AS NEEDED
Status: DISCONTINUED | OUTPATIENT
Start: 2023-05-23 | End: 2023-05-23

## 2023-05-23 RX ORDER — MAGNESIUM HYDROXIDE 1200 MG/15ML
LIQUID ORAL AS NEEDED
Status: DISCONTINUED | OUTPATIENT
Start: 2023-05-23 | End: 2023-05-23 | Stop reason: HOSPADM

## 2023-05-23 RX ORDER — DEXAMETHASONE SODIUM PHOSPHATE 10 MG/ML
INJECTION, SOLUTION INTRAMUSCULAR; INTRAVENOUS AS NEEDED
Status: DISCONTINUED | OUTPATIENT
Start: 2023-05-23 | End: 2023-05-23

## 2023-05-23 RX ORDER — CEFAZOLIN SODIUM 1 G/50ML
1000 SOLUTION INTRAVENOUS ONCE
Status: COMPLETED | OUTPATIENT
Start: 2023-05-23 | End: 2023-05-23

## 2023-05-23 RX ORDER — BALANCED SALT SOLUTION ENRICHED WITH BICARBONATE, DEXTROSE, AND GLUTATHIONE
KIT INTRAOCULAR AS NEEDED
Status: DISCONTINUED | OUTPATIENT
Start: 2023-05-23 | End: 2023-05-23 | Stop reason: HOSPADM

## 2023-05-23 RX ORDER — FENTANYL CITRATE 50 UG/ML
INJECTION, SOLUTION INTRAMUSCULAR; INTRAVENOUS AS NEEDED
Status: DISCONTINUED | OUTPATIENT
Start: 2023-05-23 | End: 2023-05-23

## 2023-05-23 RX ORDER — GINSENG 100 MG
CAPSULE ORAL AS NEEDED
Status: DISCONTINUED | OUTPATIENT
Start: 2023-05-23 | End: 2023-05-23 | Stop reason: HOSPADM

## 2023-05-23 RX ORDER — LIDOCAINE HYDROCHLORIDE AND EPINEPHRINE 10; 10 MG/ML; UG/ML
INJECTION, SOLUTION INFILTRATION; PERINEURAL AS NEEDED
Status: DISCONTINUED | OUTPATIENT
Start: 2023-05-23 | End: 2023-05-23 | Stop reason: HOSPADM

## 2023-05-23 RX ORDER — HYDROMORPHONE HCL/PF 1 MG/ML
0.4 SYRINGE (ML) INJECTION
Status: DISCONTINUED | OUTPATIENT
Start: 2023-05-23 | End: 2023-05-23 | Stop reason: HOSPADM

## 2023-05-23 RX ORDER — SODIUM CHLORIDE, SODIUM LACTATE, POTASSIUM CHLORIDE, CALCIUM CHLORIDE 600; 310; 30; 20 MG/100ML; MG/100ML; MG/100ML; MG/100ML
125 INJECTION, SOLUTION INTRAVENOUS CONTINUOUS
Status: DISCONTINUED | OUTPATIENT
Start: 2023-05-23 | End: 2023-05-23 | Stop reason: HOSPADM

## 2023-05-23 RX ADMIN — PROPOFOL 100 MCG/KG/MIN: 10 INJECTION, EMULSION INTRAVENOUS at 17:25

## 2023-05-23 RX ADMIN — LIDOCAINE HYDROCHLORIDE 100 MG: 20 INJECTION, SOLUTION EPIDURAL; INFILTRATION; INTRACAUDAL at 17:25

## 2023-05-23 RX ADMIN — PROPOFOL 150 MG: 10 INJECTION, EMULSION INTRAVENOUS at 17:25

## 2023-05-23 RX ADMIN — SODIUM CHLORIDE, SODIUM LACTATE, POTASSIUM CHLORIDE, AND CALCIUM CHLORIDE: .6; .31; .03; .02 INJECTION, SOLUTION INTRAVENOUS at 17:17

## 2023-05-23 RX ADMIN — ONDANSETRON 4 MG: 2 INJECTION INTRAMUSCULAR; INTRAVENOUS at 17:58

## 2023-05-23 RX ADMIN — MIDAZOLAM HYDROCHLORIDE 2 MG: 1 INJECTION, SOLUTION INTRAMUSCULAR; INTRAVENOUS at 17:17

## 2023-05-23 RX ADMIN — FENTANYL CITRATE 50 MCG: 50 INJECTION, SOLUTION INTRAMUSCULAR; INTRAVENOUS at 17:58

## 2023-05-23 RX ADMIN — DEXAMETHASONE SODIUM PHOSPHATE 10 MG: 10 INJECTION, SOLUTION INTRAMUSCULAR; INTRAVENOUS at 17:17

## 2023-05-23 RX ADMIN — CEFAZOLIN SODIUM 1000 MG: 1 SOLUTION INTRAVENOUS at 17:27

## 2023-05-23 RX ADMIN — FENTANYL CITRATE 50 MCG: 50 INJECTION, SOLUTION INTRAMUSCULAR; INTRAVENOUS at 17:31

## 2023-05-23 NOTE — INTERVAL H&P NOTE
H&P reviewed  After examining the patient I find no changes in the patients condition since the H&P had been written      Vitals:    05/23/23 1502   BP: 122/70   Pulse: 66   Resp: 18   Temp: 97 8 °F (36 6 °C)   SpO2: 100%

## 2023-05-23 NOTE — ANESTHESIA PREPROCEDURE EVALUATION
Procedure:  RECONSTRUCTION MOHS DEFECT OF LEFT BROW/FOREHEAD WITH LOCAL FLAP VS SKIN GRAFT (Left: Face)    Relevant Problems   CARDIO   (+) Hypertension      GI/HEPATIC   (+) GERD (gastroesophageal reflux disease)      MUSCULOSKELETAL   (+) Primary generalized (osteo)arthritis   (+) Primary osteoarthritis of right knee   (+) Rheumatoid arthritis of multiple sites with negative rheumatoid factor (HCC)      NEURO/PSYCH   (+) Anxiety   (+) Chronic right shoulder pain      PULMONARY   (+) Asthma   (+) Asthma, mild intermittent        Physical Exam    Airway    Mallampati score: II  TM Distance: >3 FB  Neck ROM: full     Dental   No notable dental hx     Cardiovascular      Pulmonary      Other Findings        Anesthesia Plan  ASA Score- 2     Anesthesia Type- general with ASA Monitors  Additional Monitors:   Airway Plan: LMA  Plan Factors-Exercise tolerance (METS): >4 METS  Chart reviewed  Existing labs reviewed  Patient summary reviewed  Induction- intravenous  Postoperative Plan-     Informed Consent- Anesthetic plan and risks discussed with patient  I personally reviewed this patient with the CRNA  Discussed and agreed on the Anesthesia Plan with the CRNA  Michelle Miranda

## 2023-05-23 NOTE — OP NOTE
OPERATIVE REPORT  PATIENT NAME: Beatriz Ho    :  1960  MRN: [id number]  Pt Location: [facility]    SURGERY DATE: 2023    Surgeon(s) and Role:     * Jocelyn Paiz MD - Primary     * Ronna Armstrong PA-C - Assisting     * Desmond Johnson MD - Assisting    Preop Diagnosis:  Basal cell carcinoma, face [C44 310]/forehead, status post Mohs    Post-Op Diagnosis Codes: * Basal cell carcinoma, face [C44 310]/forehead, status post Mohs    Procedure(s):  #1 preparation Mohs defect of the left forehead/brow via excision of wound margins, cautery artifact, and scar to prepare for reconstruction () #2 adjacent tissue transfer/local advancement flap (2 6 x 1 5 cm) reconstruction Mohs defect of the forehead/left brow    Specimen(s):  * No specimens in log *    Estimated Blood Loss:   Minimal    Drains:  * No LDAs found *    Anesthesia Type:   General    Operative Indications:  Basal cell carcinoma, face [C44 310]  Status post Mohs    Operative Findings:  As above    Complications:   None    Procedure and Technique:  Izabel Hagen was seen preoperatively in the holding area, the surgical site was marked with her participation  I reviewed with her the planned procedure, potential risks, complications, and limitations  She was taken to the operating room and underwent induction of general anesthesia by the anesthesia personnel  The operative field was prepped and draped in sterile fashion a proper timeout was performed  2 5 loupe magnification was used to aid in visualization  Local anesthesia was administered, and a 15 blade used to excise the wound margins, cautery artifact, and scar to prepare for reconstruction  Following this hemostasis was obtained with the fine-tipped Bovie cautery  Given the size and location of the defect, reconstruction was planned with a local advancement flap    This flap was based laterally, the margins were marked parallel to the natural skin creases and the skin incisions were then created with a 15 blade  These were carried down through the dermis, the flap was then elevated from distal to proximal toward its base utilizing a spreading technique with tenotomy scissors  The flap was elevated superficial to the underlying frontalis muscle once elevated, hemostasis was assured  The wound was irrigated  The flap was then advanced to fill the defect and closure was accomplished in the deep plane utilizing 5-0 Monocryl suture  Skin closure was then accomplished utilizing interrupted 6-0 Prolene sutures  The flap appeared to be fully viable and the wound was dressed with bacitracin and Xeroform  The patient was transferred to the recovery room  I was present for the entire procedure      Patient Disposition:  PACU         SIGNATURE: Escobar Russo MD  DATE: May 23, 2023  TIME: 6:17 PM

## 2023-05-23 NOTE — DISCHARGE INSTR - AVS FIRST PAGE
Dr Cash Wallace   [address] [facility], [address]  Phone: [phone]     Postoperative Instructions for Outpatient Surgery     These instructions are being provided by your doctor to give you basic guidelines during your post-op recovery  Please let our office know if your contact information has changed  Please call the office today for an appointment in 7 days for postoperative care     Dressings: You can remove xeroform (yellow dressing) if it falls off     Activity Restrictions: No strenuous activity, avoiding repetitive bending for 48hrs     Bathing: You can bath in 24-36hrs     Medications:    Resume pre-op medications     You may take tylenol, aleve, or ibuprofen for pain control                Other: Elevate head of bed with multiple pillows or sleep in a recliner for 48-72hrs              Apply bacitracin 4 times daily for 4 days

## 2023-05-23 NOTE — ANESTHESIA POSTPROCEDURE EVALUATION
Post-Op Assessment Note    CV Status:  Stable       Mental Status:  Awake   Hydration Status:  Stable   Airway Patency:  Patent       Staff: Anesthesiologist         No notable events documented      BP   122/56   Temp   97 4   Pulse  74   Resp   12   SpO2   96%

## 2023-06-05 ENCOUNTER — OFFICE VISIT (OUTPATIENT)
Dept: PLASTIC SURGERY | Facility: CLINIC | Age: 63
End: 2023-06-05

## 2023-06-05 DIAGNOSIS — C44.310 BASAL CELL CARCINOMA, FACE: Primary | ICD-10-CM

## 2023-06-05 NOTE — PROGRESS NOTES
Assessment/Plan:     Diagnoses and all orders for this visit:    Basal cell carcinoma, face  Patient was diagnosed with basal cell carcinoma of the forehead  She underwent Mohs with reconstruction, adjacent tissue transfer/local advancement flap  She is doing well  Sutures were removed today in the office  We did discuss scar care and massage  We will see her back in 3 months or sooner if necessary  Subjective:      Patient ID: Hunter El is a 70 y o  female  HPI     Patient is here for postop visit  She was diagnosed with a basal cell of the forehead  She underwent Mohs with reconstruction, adjacent tissue transfer/local advancement flap  She states she had a lot of bruising around her eye after the procedure but this is slowly resolving  She denies any pain      Patient Active Problem List   Diagnosis   • Hypertension   • GERD (gastroesophageal reflux disease)   • Asthma   • Allergic rhinitis   • Anxiety   • Asthma, mild intermittent   • Tinnitus   • Rheumatoid arthritis of multiple sites with negative rheumatoid factor (HCC)   • Primary generalized (osteo)arthritis   • Atrophy of vagina   • Leiomyoma of uterus   • Basal cell carcinoma of skin of nose   • Acute pain of right knee   • Primary osteoarthritis of right knee   • Chronic right shoulder pain   • Basal cell carcinoma, face     Allergies   Allergen Reactions   • Other Allergic Rhinitis     Seasonal  Mold     Current Outpatient Medications on File Prior to Visit   Medication Sig   • albuterol (Ventolin HFA) 90 mcg/act inhaler Inhale 2 puffs every 6 (six) hours as needed for wheezing or shortness of breath   • Cholecalciferol (VITAMIN D3) 1000 units CHEW Chew   • fluticasone (FLONASE) 50 mcg/act nasal spray 2 sprays into each nostril daily   • hydrochlorothiazide (HYDRODIURIL) 25 mg tablet Take 1 tablet (25 mg total) by mouth daily   • Ibuprofen 200 MG CAPS Take 2 capsules by mouth 2 (two) times a day   • meclizine (ANTIVERT) 25 mg tablet Take 1 tablet by mouth every 8 (eight) hours as needed for dizziness   • multivitamin (THERAGRAN) TABS Take 1 tablet by mouth daily   • RABEprazole (ACIPHEX) 20 MG tablet Take 20 mg by mouth daily   • vitamin B-12 (VITAMIN B-12) 1,000 mcg tablet Take by mouth daily     No current facility-administered medications on file prior to visit       Family History   Problem Relation Age of Onset   • Arthritis Mother    • Hypertension Mother    • Coronary artery disease Mother    • Skin cancer Mother    • Varicose Veins Mother    • Uterine cancer Mother    • Arthritis Father    • Hypertension Father    • Stroke Father    • Coronary artery disease Father    • Other Father         Stent indications   • Ulcerative colitis Sister    • Rheum arthritis Maternal Grandmother    • Substance Abuse Neg Hx    • Mental illness Neg Hx      Past Medical History:   Diagnosis Date   • Abnormal findings on diagnostic imaging of breast     Last assessed 08/16/13   • Adjustment disorder with depressed mood     Last assessed 05/17/13   • Anxiety    • Arthritis     hands   • Erythema migrans (Lyme disease)     Last assessed 06/29/13   • GERD (gastroesophageal reflux disease)    • Polyarthritis     Last assessed 02/17/16   • Uterine leiomyoma    • Vertigo      Social History     Socioeconomic History   • Marital status: /Civil Union     Spouse name: None   • Number of children: None   • Years of education: None   • Highest education level: None   Occupational History   • None   Tobacco Use   • Smoking status: Never   • Smokeless tobacco: Never   Vaping Use   • Vaping Use: Never used   Substance and Sexual Activity   • Alcohol use: Yes     Comment: social- 2-3 glasses of wine   • Drug use: No   • Sexual activity: None   Other Topics Concern   • None   Social History Narrative    Caffeine Use 1 cup per day    Per allscripts: Denied: History of [place]    [employer]     Social Determinants of Health     Financial Resource Strain: Not on file   Food Insecurity: Not on file   Transportation Needs: Not on file   Physical Activity: Not on file   Stress: Not on file   Social Connections: Not on file   Intimate Partner Violence: Not on file   Housing Stability: Not on file     Past Surgical History:   Procedure Laterality Date   • BREAST BIOPSY Left 2002   • BREAST SURGERY     • CARPAL TUNNEL RELEASE Bilateral 10/2008   • DENTAL SURGERY     • DILATION AND CURETTAGE OF UTERUS     • HYSTEROSCOPY      w/D and C on 06 for irregular menses  Her pathology demonstrated proliferative endometrium   • INDUCED      • MOHS RECONSTRUCTION Right 2019    Procedure: RECONSTRUCTION MOHS DEFECT RIGHT NOSE;  Surgeon: Uzair Honeycutt MD;  Location: [facility];  Service: Plastics   • MOHS RECONSTRUCTION Left 2023    Procedure: RECONSTRUCTION MOHS DEFECT OF LEFT BROW/FOREHEAD WITH LOCAL FLAP;  Surgeon: Uzair Honeycutt MD;  [facility];  Service: Plastics   • DC ADJT TIS REARGMT EYE/NOSE/EAR/LIP 10 1-30 0 SQCM Right 2019    Procedure: FLAP LOCAL RIGHT NOSE VS FTSG;  Surgeon: Uzair Honeycutt MD;  Location[facility];  Service: Plastics   • SKIN BIOPSY     • TONSILLECTOMY     • WRIST SURGERY Right          Review of Systems   All other systems reviewed and are negative  Objective:      LMP  (LMP Unknown)          Physical Exam  Constitutional:       Appearance: She is well-developed  HENT:      Head: Normocephalic  Comments: Incision C/D/I, see picture in media  Eyes:      Conjunctiva/sclera: Conjunctivae normal    Pulmonary:      Effort: Pulmonary effort is normal    Abdominal:      Palpations: Abdomen is soft  Tenderness: There is no abdominal tenderness  Musculoskeletal:         General: Normal range of motion  Cervical back: Normal range of motion  Skin:     General: Skin is warm and dry  Neurological:      Mental Status: She is alert and oriented to person, place, and time  Psychiatric:         Mood and Affect: Mood normal          Behavior: Behavior normal

## 2023-08-01 ENCOUNTER — HOSPITAL ENCOUNTER (OUTPATIENT)
Dept: RADIOLOGY | Age: 63
Discharge: HOME | End: 2023-08-01
Payer: COMMERCIAL

## 2023-08-01 DIAGNOSIS — Z12.31 ENCOUNTER FOR SCREENING MAMMOGRAM FOR MALIGNANT NEOPLASM OF BREAST: ICD-10-CM

## 2023-08-01 PROCEDURE — 77063 BREAST TOMOSYNTHESIS BI: CPT

## 2023-08-01 PROCEDURE — 77067 SCR MAMMO BI INCL CAD: CPT

## 2023-08-06 LAB
BASOPHILS # BLD AUTO: 61 CELLS/UL (ref 0–200)
BASOPHILS NFR BLD AUTO: 1.7 %
BUN SERPL-MCNC: 14 MG/DL (ref 7–25)
BUN/CREAT SERPL: ABNORMAL (CALC) (ref 6–22)
CALCIUM SERPL-MCNC: 9.6 MG/DL (ref 8.6–10.4)
CHLORIDE SERPL-SCNC: 103 MMOL/L (ref 98–110)
CO2 SERPL-SCNC: 33 MMOL/L (ref 20–32)
CREAT SERPL-MCNC: 0.83 MG/DL (ref 0.5–1.05)
EOSINOPHIL # BLD AUTO: 130 CELLS/UL (ref 15–500)
EOSINOPHIL NFR BLD AUTO: 3.6 %
ERYTHROCYTE [DISTWIDTH] IN BLOOD BY AUTOMATED COUNT: 11 % (ref 11–15)
GFR/BSA.PRED SERPLBLD CYS-BASED-ARV: 78 ML/MIN/1.73M2
GLUCOSE SERPL-MCNC: 90 MG/DL (ref 65–99)
HCT VFR BLD AUTO: 39 % (ref 35–45)
HGB BLD-MCNC: 13.4 G/DL (ref 11.7–15.5)
LYMPHOCYTES # BLD AUTO: 1130 CELLS/UL (ref 850–3900)
LYMPHOCYTES NFR BLD AUTO: 31.4 %
MCH RBC QN AUTO: 34.6 PG (ref 27–33)
MCHC RBC AUTO-ENTMCNC: 34.4 G/DL (ref 32–36)
MCV RBC AUTO: 100.8 FL (ref 80–100)
MONOCYTES # BLD AUTO: 436 CELLS/UL (ref 200–950)
MONOCYTES NFR BLD AUTO: 12.1 %
NEUTROPHILS # BLD AUTO: 1843 CELLS/UL (ref 1500–7800)
NEUTROPHILS NFR BLD AUTO: 51.2 %
PLATELET # BLD AUTO: 232 THOUSAND/UL (ref 140–400)
PMV BLD REES-ECKER: 9.1 FL (ref 7.5–12.5)
POTASSIUM SERPL-SCNC: 3.7 MMOL/L (ref 3.5–5.3)
RBC # BLD AUTO: 3.87 MILLION/UL (ref 3.8–5.1)
SODIUM SERPL-SCNC: 141 MMOL/L (ref 135–146)
WBC # BLD AUTO: 3.6 THOUSAND/UL (ref 3.8–10.8)

## 2023-08-08 ENCOUNTER — OFFICE VISIT (OUTPATIENT)
Dept: FAMILY MEDICINE CLINIC | Facility: CLINIC | Age: 63
End: 2023-08-08
Payer: COMMERCIAL

## 2023-08-08 VITALS
HEIGHT: 66 IN | RESPIRATION RATE: 16 BRPM | WEIGHT: 139 LBS | BODY MASS INDEX: 22.34 KG/M2 | TEMPERATURE: 98.2 F | SYSTOLIC BLOOD PRESSURE: 138 MMHG | HEART RATE: 72 BPM | DIASTOLIC BLOOD PRESSURE: 80 MMHG

## 2023-08-08 DIAGNOSIS — M25.551 RIGHT HIP PAIN: ICD-10-CM

## 2023-08-08 DIAGNOSIS — M54.41 RIGHT-SIDED LOW BACK PAIN WITH RIGHT-SIDED SCIATICA, UNSPECIFIED CHRONICITY: Primary | ICD-10-CM

## 2023-08-08 DIAGNOSIS — I83.893 VARICOSE VEINS OF BILATERAL LOWER EXTREMITIES WITH OTHER COMPLICATIONS: ICD-10-CM

## 2023-08-08 PROCEDURE — 99214 OFFICE O/P EST MOD 30 MIN: CPT | Performed by: FAMILY MEDICINE

## 2023-08-08 RX ORDER — METHOCARBAMOL 500 MG/1
500 TABLET, FILM COATED ORAL 3 TIMES DAILY PRN
Qty: 20 TABLET | Refills: 0 | Status: SHIPPED | OUTPATIENT
Start: 2023-08-08

## 2023-08-08 RX ORDER — PREDNISONE 10 MG/1
TABLET ORAL
Qty: 20 TABLET | Refills: 0 | Status: SHIPPED | OUTPATIENT
Start: 2023-08-08

## 2023-08-08 NOTE — PROGRESS NOTES
Assessment/Plan:    Right-sided low back pain with right-sided sciatica  - advised to start prednisone taper, take Tylenol as needed for pain and Robaxin as needed for muscle spasms, will obtain x-rays and refer to Physical therapy, encouraged to follow up for persistent or worsening symptoms  - XR spine lumbar minimum 4 views non injury; Future  - predniSONE 10 mg tablet; Take 4 tablets daily with food for 2 days, 3 tablets daily for 2 days, 2 tablets daily for 2 days, 1 tablet daily for 2 days  Dispense: 20 tablet; Refill: 0  - methocarbamol (ROBAXIN) 500 mg tablet; Take 1 tablet (500 mg total) by mouth 3 (three) times a day as needed for muscle spasms  Dispense: 20 tablet; Refill: 0  - Ambulatory referral to Physical Therapy; Future    Right hip pain  - XR hip/pelv 2-3 vws right if performed; Future  - Ambulatory referral to Physical Therapy; Future    Varicose veins of bilateral lower extremities with other complications  - Ambulatory Referral to Vascular Surgery; Future         Return as scheduled or sooner as needed. The treatment plan and possible side effects of new medications were reviewed with the patient today. The patient understands and agrees with the treatment plan. Subjective:   Chief Complaint   Patient presents with   • Back Pain     Pt states she has pain starting from her right lower/ hip back going down to her thighs      Patient ID: Pierce Swain is a 70 y.o. female who presents today with c/o right sided low back/ hip pain radiating down to her groin and thighs onset a few weeks ago after doing a lot of yard work, mulching and lifting heavy bags. Patient reports worsening pain when standing up from sitting, with prolonged walking or standing. No leg weakness, no numbness, no bladder/ bowel control dysfunction, no saddle paresthesias, no fever, no urinary frequency, urgency or hematuria.          The following portions of the patient's history were reviewed and updated as appropriate: allergies, current medications, past family history, past medical history, past social history, past surgical history and problem list.    Past Medical History:   Diagnosis Date   • Abnormal findings on diagnostic imaging of breast     Last assessed 13   • Adjustment disorder with depressed mood     Last assessed 13   • Anxiety    • Arthritis     hands   • Erythema migrans (Lyme disease)     Last assessed 13   • GERD (gastroesophageal reflux disease)    • Polyarthritis     Last assessed 16   • Uterine leiomyoma    • Vertigo      Past Surgical History:   Procedure Laterality Date   • BREAST BIOPSY Left 2002   • BREAST SURGERY     • CARPAL TUNNEL RELEASE Bilateral 10/2008   • DENTAL SURGERY     • DILATION AND CURETTAGE OF UTERUS     • HYSTEROSCOPY      w/D and C on 06 for irregular menses.   Her pathology demonstrated proliferative endometrium   • INDUCED      • MOHS RECONSTRUCTION Right 2019    Procedure: RECONSTRUCTION MOHS DEFECT RIGHT NOSE;  Surgeon: Frida Lund MD;  [facility];  Service: Plastics   • MOHS RECONSTRUCTION Left 2023    Procedure: RECONSTRUCTION MOHS DEFECT OF LEFT BROW/FOREHEAD WITH LOCAL FLAP;  Surgeon: Frida Lund MD;  [facility];  Service: Plastics   • ME ADJT TIS REARGMT EYE/NOSE/EAR/LIP 10.1-30.0 SQCM Right 2019    Procedure: FLAP LOCAL RIGHT NOSE VS FTSG;  Surgeon: Frida Lund MD;  Location[facility];  Service: Plastics   • SKIN BIOPSY     • TONSILLECTOMY     • WRIST SURGERY Right      Family History   Problem Relation Age of Onset   • Arthritis Mother    • Hypertension Mother    • Coronary artery disease Mother    • Skin cancer Mother    • Varicose Veins Mother    • Uterine cancer Mother    • Arthritis Father    • Hypertension Father    • Stroke Father    • Coronary artery disease Father    • Other Father         Stent indications   • Ulcerative colitis Sister    • Rheum arthritis Maternal Grandmother    • Substance Abuse Neg Hx    • Mental illness Neg Hx      Social History     Socioeconomic History   • Marital status: /Civil Union     Spouse name: Not on file   • Number of children: Not on file   • Years of education: Not on file   • Highest education level: Not on file   Occupational History   • Not on file   Tobacco Use   • Smoking status: Never   • Smokeless tobacco: Never   Vaping Use   • Vaping Use: Never used   Substance and Sexual Activity   • Alcohol use: Yes     Comment: social- 2-3 glasses of wine   • Drug use: No   • Sexual activity: Not on file   Other Topics Concern   • Not on file   Social History Narrative    Caffeine Use 1 cup per day    Per allscripts: Denied: History of [facility]     Social Determinants of Health     Financial Resource Strain: Not on file   Food Insecurity: Not on file   Transportation Needs: Not on file   Physical Activity: Not on file   Stress: Not on file   Social Connections: Not on file   Intimate Partner Violence: Not on file   Housing Stability: Not on file       Current Outpatient Medications:   •  albuterol (Ventolin HFA) 90 mcg/act inhaler, Inhale 2 puffs every 6 (six) hours as needed for wheezing or shortness of breath, Disp: 18 g, Rfl: 1  •  Cholecalciferol (VITAMIN D3) 1000 units CHEW, Chew, Disp: , Rfl:   •  fluticasone (FLONASE) 50 mcg/act nasal spray, 2 sprays into each nostril daily, Disp: 48 g, Rfl: 2  •  hydrochlorothiazide (HYDRODIURIL) 25 mg tablet, Take 1 tablet (25 mg total) by mouth daily, Disp: 90 tablet, Rfl: 3  •  Ibuprofen 200 MG CAPS, Take 2 capsules by mouth 2 (two) times a day, Disp: , Rfl:   •  meclizine (ANTIVERT) 25 mg tablet, Take 1 tablet by mouth every 8 (eight) hours as needed for dizziness, Disp: 10 tablet, Rfl: 0  •  methocarbamol (ROBAXIN) 500 mg tablet, Take 1 tablet (500 mg total) by mouth 3 (three) times a day as needed for muscle spasms, Disp: 20 tablet, Rfl: 0  •  multivitamin (Julia Lose) TABS, Take 1 tablet by mouth daily, Disp: , Rfl:   •  predniSONE 10 mg tablet, Take 4 tablets daily with food for 2 days, 3 tablets daily for 2 days, 2 tablets daily for 2 days, 1 tablet daily for 2 days, Disp: 20 tablet, Rfl: 0  •  RABEprazole (ACIPHEX) 20 MG tablet, Take 20 mg by mouth daily, Disp: , Rfl:   •  vitamin B-12 (VITAMIN B-12) 1,000 mcg tablet, Take by mouth daily, Disp: , Rfl:     Review of Systems   Constitutional: Negative for appetite change, diaphoresis, fatigue and fever. Respiratory: Negative for cough, shortness of breath and wheezing. Cardiovascular: Negative for chest pain, palpitations and leg swelling. Gastrointestinal: Negative for abdominal pain, blood in stool, diarrhea, nausea and vomiting. Genitourinary: Negative for difficulty urinating, dysuria, frequency, hematuria and urgency. Musculoskeletal:        As per HPI   Skin: Negative for rash. Neurological: Negative for weakness and numbness. Objective:    Vitals:    08/08/23 1434   BP: 138/80   Pulse: 72   Resp: 16   Temp: 98.2 °F (36.8 °C)   Weight: 63 kg (139 lb)   Height: 5' 5.5" (1.664 m)        Physical Exam  Constitutional:       General: She is not in acute distress. Cardiovascular:      Rate and Rhythm: Normal rate and regular rhythm. Heart sounds: Normal heart sounds. Pulmonary:      Effort: Pulmonary effort is normal.      Breath sounds: Normal breath sounds. Abdominal:      Palpations: Abdomen is soft. There is no mass. Tenderness: There is no abdominal tenderness. There is no right CVA tenderness or left CVA tenderness. Musculoskeletal:      Comments: Good heel/ toe walk, low back ROM with pain on flexion, no L-spine or right hip tenderness, + right lower lumbar paraspinal muscle tenderness, SLR negative b/l, muscle strength 5/5 in lower extremities, DTR's 2/4   Neurological:      Mental Status: She is alert and oriented to person, place, and time.    Psychiatric:         Mood and Affect: Mood normal.         Behavior: Behavior normal.

## 2023-08-09 ENCOUNTER — HOSPITAL ENCOUNTER (OUTPATIENT)
Dept: RADIOLOGY | Facility: HOSPITAL | Age: 63
Discharge: HOME | End: 2023-08-09
Payer: COMMERCIAL

## 2023-08-09 DIAGNOSIS — M25.551 RIGHT HIP PAIN: ICD-10-CM

## 2023-08-09 DIAGNOSIS — M54.41 RIGHT-SIDED LOW BACK PAIN WITH RIGHT-SIDED SCIATICA, UNSPECIFIED CHRONICITY: ICD-10-CM

## 2023-08-09 PROCEDURE — 73502 X-RAY EXAM HIP UNI 2-3 VIEWS: CPT

## 2023-08-09 PROCEDURE — 72110 X-RAY EXAM L-2 SPINE 4/>VWS: CPT

## 2023-08-22 ENCOUNTER — TELEPHONE (OUTPATIENT)
Dept: FAMILY MEDICINE CLINIC | Facility: CLINIC | Age: 63
End: 2023-08-22

## 2023-08-22 NOTE — PROGRESS NOTES
Assessment and Plan:   Ms. Joshua Cheatham is a 55-year-old female with history significant for possible seronegative rheumatoid arthritis, primary generalized osteoarthritis and postmenopausal osteoporosis who presents for an evaluation of ongoing joint pains. She is currently not on DMARDs. She is referred by Dr. Melissa Martinez for a rheumatology consult. Kortney Reid presents today for an evaluation of progressively worsening arthralgias affecting her bilateral hands, knees and feet. She was previously evaluated by [facility] rheumatology in 2018 and diagnosed with seronegative rheumatoid arthritis. This may be a possibility given the findings on her hand examination, but could also be representative of primary osteoarthritic changes. To differentiate between both conditions I recommend updating x-rays of her hands as well as proceeding with an ultrasound of her bilateral hands and wrists. As she has been taking ibuprofen frequently I advised her to discontinue this and we will trial her on meloxicam 15 mg once a day as needed. I will see her back for an office visit to review the results of the blood work, x-rays and ultrasound and discuss the next steps in management. - She does have a diagnosis of osteoporosis based on a DEXA scan from 2020. She is updating a DEXA scan and is scheduled for 10/9/2023. I will review this at the follow-up visit and depending on if her primary care physician would like me to manage the osteoporosis I will discuss the next steps in treatment with her. Plan:  Diagnoses and all orders for this visit:    Seronegative rheumatoid arthritis ([address])  -     Sjogren's Antibodies; Future  -     Sedimentation rate, automated; Future  -     C-reactive protein; Future  -     RF Screen w/ Reflex to Titer; Future  -     Cyclic citrul peptide antibody, IgG; Future  -     Uric acid; Future  -     XR hand 3+ vw right; Future  -     XR hand 3+ vw left;  Future  -     XR foot 3+ vw right; Future  - XR foot 3+ vw left; Future  -     XR knee 3 vw left non injury; Future  -     XR knee 3 vw right non injury; Future  -     meloxicam (Mobic) 15 mg tablet; Take 1 tablet (15 mg total) by mouth daily as needed for moderate pain    Bilateral hand pain  -     Sjogren's Antibodies; Future  -     Sedimentation rate, automated; Future  -     C-reactive protein; Future  -     RF Screen w/ Reflex to Titer; Future  -     Cyclic citrul peptide antibody, IgG; Future  -     US MSK limited; Future    Primary generalized (osteo)arthritis    Age-related osteoporosis without current pathological fracture  -     Magnesium; Future  -     Phosphorus; Future  -     Protein electrophoresis, serum; Future  -     PTH, intact; Future    Need for hepatitis C screening test  -     Chronic Hepatitis Panel; Future    Vitamin D deficiency  -     Vitamin D 25 hydroxy; Future      I have personally reviewed pertinent films in PACS of the right hip XR which shows osteoarthritis. Activities as tolerated. Exercise: try to maintain a low impact exercise regimen as much as possible. Walk for 30 minutes a day for at least 3 days a week. Continue other medications as prescribed by PCP and other specialists. RTC after tests resulted. HPI      Rheumatic Disease Summary:  Established care- February, 2016- long standing hx of polyarthritis using Advil.  -Serologies were negative however due to chronic RA deformities, patient was started on HCQ. -Patient not willing to proceed with further options. - 8/23/23: re-evaluate OA vs RA.        HPI  Ms. Freedom Chavira is a 77-year-old female with history significant for possible seronegative rheumatoid arthritis, primary generalized osteoarthritis and postmenopausal osteoporosis who presents for an evaluation of ongoing joint pains. She is currently not on DMARDs. She is referred by Dr. Chantel Vega for a rheumatology consult.     Patient was seen by [facility] rheumatology LINH Silva in 2018 and diagnosed with seronegative rheumatoid arthritis in association with primary generalized osteoarthritis. She was started on hydroxychloroquine but patient was not compliant with treatment or follow-ups and has not been seen in the office since then. She reports over the years she has been appreciating progressively worsening joint pain especially affecting her hands, knees and feet. She has noticed enlargement of the joints in her hands but does not report specific joint swelling except for affecting her ankles. No morning stiffness of her joints. She is currently taking over-the-counter ibuprofen up to 8 times a day as needed to help with some of her symptoms. She denies a history of inflammatory eye disease, psoriasis or inflammatory bowel disease. She thinks her grandmother may have had rheumatoid arthritis. I reviewed lab testing done in 2016 which showed a negative ESTEBAN, Sjogren's antibodies, rheumatoid factor, anti-CCP antibody and HLA-B27 antigen. The following portions of the patient's history were reviewed and updated as appropriate: allergies, current medications, past family history, past medical history, past social history, past surgical history and problem list.      Review of Systems  Constitutional: Negative for weight change, fevers, chills, night sweats, fatigue. ENT/Mouth: Negative for hearing changes, ear pain, nasal congestion, sinus pain, hoarseness, sore throat, rhinorrhea, swallowing difficulty. Eyes: Negative for pain, redness, discharge, vision changes. Cardiovascular: Negative for chest pain, SOB, palpitations. Respiratory: Negative for cough, sputum, wheezing, dyspnea. Gastrointestinal: Negative for nausea, vomiting, diarrhea, constipation, pain, heartburn. Genitourinary: Negative for dysuria, urinary frequency, hematuria. Musculoskeletal: As per HPI. Skin: Negative for skin rash, color changes.    Neuro: Negative for weakness, numbness, tingling, loss of consciousness. Psych: Negative for anxiety, depression. Heme/Lymph: Negative for easy bruising, bleeding, lymphadenopathy. Past Medical History:   Diagnosis Date   • Abnormal findings on diagnostic imaging of breast     Last assessed 13   • Adjustment disorder with depressed mood     Last assessed 13   • Anxiety    • Arthritis     hands   • Erythema migrans (Lyme disease)     Last assessed 13   • GERD (gastroesophageal reflux disease)    • Polyarthritis     Last assessed 16   • Uterine leiomyoma    • Vertigo        Past Surgical History:   Procedure Laterality Date   • BREAST BIOPSY Left 2002   • BREAST SURGERY     • CARPAL TUNNEL RELEASE Bilateral 10/2008   • DENTAL SURGERY     • DILATION AND CURETTAGE OF UTERUS     • HYSTEROSCOPY      w/D and C on 06 for irregular menses.   Her pathology demonstrated proliferative endometrium   • INDUCED      • MOHS RECONSTRUCTION Right 2019    Procedure: RECONSTRUCTION MOHS DEFECT RIGHT NOSE;  Surgeon: Nacho Rainey MD;  Location: [facility] MAIN OR;  Service: Plastics   • MOHS RECONSTRUCTION Left 2023    Procedure: RECONSTRUCTION MOHS DEFECT OF LEFT BROW/FOREHEAD WITH LOCAL FLAP;  Surgeon: Nacho Rainey MD;  Location[facility];  Service: Plastics   • NV ADJT TIS REARGMT EYE/NOSE/EAR/LIP 10.1-30.0 SQCM Right 2019    Procedure: FLAP LOCAL RIGHT NOSE VS FTSG;  Surgeon: Nacho Rainey MD;  Location: [facility];  Service: Plastics   • SKIN BIOPSY     • TONSILLECTOMY     • WRIST SURGERY Right        Social History     Socioeconomic History   • Marital status: /Civil Union     Spouse name: Not on file   • Number of children: Not on file   • Years of education: Not on file   • Highest education level: Not on file   Occupational History   • Not on file   Tobacco Use   • Smoking status: Never   • Smokeless tobacco: Never   Vaping Use   • Vaping Use: Never used   Substance and Sexual Activity   • Alcohol use: Yes     Comment: social- 2-3 glasses of wine   • Drug use: No   • Sexual activity: Not on file   Other Topics Concern   • Not on file   Social History Narrative    Caffeine Use 1 cup per day    Per allscripts: Denied: History of [facility]     Social Determinants of Health     Financial Resource Strain: Not on file   Food Insecurity: Not on file   Transportation Needs: Not on file   Physical Activity: Not on file   Stress: Not on file   Social Connections: Not on file   Intimate Partner Violence: Not on file   Housing Stability: Not on file       Family History   Problem Relation Age of Onset   • Arthritis Mother    • Hypertension Mother    • Coronary artery disease Mother    • Skin cancer Mother    • Varicose Veins Mother    • Uterine cancer Mother    • Arthritis Father    • Hypertension Father    • Stroke Father    • Coronary artery disease Father    • Other Father         Stent indications   • Ulcerative colitis Sister    • Rheum arthritis Maternal Grandmother    • Substance Abuse Neg Hx    • Mental illness Neg Hx        Allergies   Allergen Reactions   • Other Allergic Rhinitis     Seasonal  Mold       Current Outpatient Medications:   •  meloxicam (Mobic) 15 mg tablet, Take 1 tablet (15 mg total) by mouth daily as needed for moderate pain, Disp: 30 tablet, Rfl: 0  •  albuterol (Ventolin HFA) 90 mcg/act inhaler, Inhale 2 puffs every 6 (six) hours as needed for wheezing or shortness of breath, Disp: 18 g, Rfl: 1  •  Cholecalciferol (VITAMIN D3) 1000 units CHEW, Chew, Disp: , Rfl:   •  fluticasone (FLONASE) 50 mcg/act nasal spray, 2 sprays into each nostril daily, Disp: 48 g, Rfl: 2  •  hydrochlorothiazide (HYDRODIURIL) 25 mg tablet, Take 1 tablet (25 mg total) by mouth daily, Disp: 90 tablet, Rfl: 3  •  Ibuprofen 200 MG CAPS, Take 2 capsules by mouth 2 (two) times a day, Disp: , Rfl:   •  meclizine (ANTIVERT) 25 mg tablet, Take 1 tablet by mouth every 8 (eight) hours as needed for dizziness, Disp: 10 tablet, Rfl: 0  •  multivitamin (THERAGRAN) TABS, Take 1 tablet by mouth daily, Disp: , Rfl:   •  RABEprazole (ACIPHEX) 20 MG tablet, Take 20 mg by mouth daily, Disp: , Rfl:   •  vitamin B-12 (VITAMIN B-12) 1,000 mcg tablet, Take by mouth daily, Disp: , Rfl:       Objective:    Vitals:    08/23/23 1356   BP: 142/82   Weight: 63.4 kg (139 lb 12.8 oz)       Physical Exam  General: Well appearing, well nourished, in no distress. Oriented x 3, normal mood and affect. Ambulating without difficulty. Skin: Good turgor, no rash, unusual bruising or prominent lesions. Hair: Normal texture and distribution. Nails: Normal color, no deformities. HEENT:  Head: Normocephalic, atraumatic. Eyes: Conjunctiva clear, sclera non-icteric, EOM intact. Extremities: No amputations or deformities, cyanosis, edema. Musculoskeletal:   Hands -she has prominent osteoarthritic changes noted at her bilateral PIP and DIP joints without tenderness. She does have mild soft tissue swelling of bilateral PIP joints but this may be synovial changes as a result of osteoarthritis. No prominent synovitis noted. Knees -no soft tissue swelling or tenderness noted but there is bilateral crepitus. Ankles -bilateral soft tissue swelling noted. Feet -minimal toe deformities noted without soft tissue swelling. Neurologic: Alert and oriented. No focal neurological deficits appreciated. Psychiatric: Normal mood and affect. Angelina Hammans, M.D.   Rheumatology

## 2023-08-23 ENCOUNTER — HOSPITAL ENCOUNTER (OUTPATIENT)
Dept: RADIOLOGY | Facility: HOSPITAL | Age: 63
Discharge: HOME | End: 2023-08-23
Payer: COMMERCIAL

## 2023-08-23 ENCOUNTER — OFFICE VISIT (OUTPATIENT)
Dept: RHEUMATOLOGY | Facility: CLINIC | Age: 63
End: 2023-08-23
Payer: COMMERCIAL

## 2023-08-23 VITALS — SYSTOLIC BLOOD PRESSURE: 142 MMHG | DIASTOLIC BLOOD PRESSURE: 82 MMHG | WEIGHT: 139.8 LBS | BODY MASS INDEX: 22.91 KG/M2

## 2023-08-23 DIAGNOSIS — M06.00 SERONEGATIVE RHEUMATOID ARTHRITIS (HCC): ICD-10-CM

## 2023-08-23 DIAGNOSIS — M79.641 BILATERAL HAND PAIN: ICD-10-CM

## 2023-08-23 DIAGNOSIS — M79.642 BILATERAL HAND PAIN: ICD-10-CM

## 2023-08-23 DIAGNOSIS — M81.0 AGE-RELATED OSTEOPOROSIS WITHOUT CURRENT PATHOLOGICAL FRACTURE: ICD-10-CM

## 2023-08-23 DIAGNOSIS — M06.00 SERONEGATIVE RHEUMATOID ARTHRITIS (HCC): Primary | ICD-10-CM

## 2023-08-23 DIAGNOSIS — E55.9 VITAMIN D DEFICIENCY: ICD-10-CM

## 2023-08-23 DIAGNOSIS — Z11.59 NEED FOR HEPATITIS C SCREENING TEST: ICD-10-CM

## 2023-08-23 DIAGNOSIS — M15.0 PRIMARY GENERALIZED (OSTEO)ARTHRITIS: ICD-10-CM

## 2023-08-23 PROCEDURE — 73630 X-RAY EXAM OF FOOT: CPT

## 2023-08-23 PROCEDURE — 73562 X-RAY EXAM OF KNEE 3: CPT

## 2023-08-23 PROCEDURE — 73130 X-RAY EXAM OF HAND: CPT

## 2023-08-23 PROCEDURE — 99205 OFFICE O/P NEW HI 60 MIN: CPT | Performed by: INTERNAL MEDICINE

## 2023-08-23 RX ORDER — MELOXICAM 15 MG/1
15 TABLET ORAL DAILY PRN
Qty: 30 TABLET | Refills: 0 | Status: SHIPPED | OUTPATIENT
Start: 2023-08-23

## 2023-08-30 LAB
25(OH)D3 SERPL-MCNC: 30 NG/ML (ref 30–100)
ALBUMIN SERPL ELPH-MCNC: 3.9 G/DL (ref 3.8–4.8)
ALPHA1 GLOB SERPL ELPH-MCNC: 0.2 G/DL (ref 0.2–0.3)
ALPHA2 GLOB SERPL ELPH-MCNC: 0.5 G/DL (ref 0.5–0.9)
BETA1 GLOB SERPL ELPH-MCNC: 0.4 G/DL (ref 0.4–0.6)
BETA2 GLOB SERPL ELPH-MCNC: 0.4 G/DL (ref 0.2–0.5)
CALCIUM SERPL-MCNC: 9.3 MG/DL (ref 8.6–10.4)
CCP IGG SERPL-ACNC: <16 UNITS
CRP SERPL-MCNC: 1.1 MG/L
ENA SS-A AB SER IA-ACNC: NORMAL AI
ENA SS-B AB SER IA-ACNC: NORMAL AI
ERYTHROCYTE [SEDIMENTATION RATE] IN BLOOD BY WESTERGREN METHOD: 2 MM/H
GAMMA GLOB SERPL ELPH-MCNC: 0.6 G/DL (ref 0.8–1.7)
HAV IGM SERPL QL IA: NORMAL
HBV CORE IGM SERPL QL IA: NORMAL
HBV SURFACE AG SERPL QL IA: NORMAL
HCV AB SERPL QL IA: NORMAL
MAGNESIUM SERPL-MCNC: 2.1 MG/DL (ref 1.5–2.5)
PHOSPHATE SERPL-MCNC: 3.1 MG/DL (ref 2.1–4.3)
PROT SERPL-MCNC: 6 G/DL (ref 6.1–8.1)
PTH-INTACT SERPL-MCNC: 33 PG/ML (ref 16–77)
RHEUMATOID FACT SERPL-ACNC: <14 IU/ML
URATE SERPL-MCNC: 4.8 MG/DL (ref 2.5–7)

## 2023-09-12 ENCOUNTER — OFFICE VISIT (OUTPATIENT)
Dept: VASCULAR SURGERY | Facility: CLINIC | Age: 63
End: 2023-09-12
Payer: COMMERCIAL

## 2023-09-12 VITALS
OXYGEN SATURATION: 98 % | DIASTOLIC BLOOD PRESSURE: 92 MMHG | BODY MASS INDEX: 22.5 KG/M2 | WEIGHT: 140 LBS | SYSTOLIC BLOOD PRESSURE: 140 MMHG | HEIGHT: 66 IN | HEART RATE: 73 BPM

## 2023-09-12 DIAGNOSIS — I83.893 VARICOSE VEINS OF BILATERAL LOWER EXTREMITIES WITH OTHER COMPLICATIONS: Primary | ICD-10-CM

## 2023-09-12 PROCEDURE — 99203 OFFICE O/P NEW LOW 30 MIN: CPT | Performed by: NURSE PRACTITIONER

## 2023-09-12 NOTE — PATIENT INSTRUCTIONS
Continue conservative medical management with daily use of medical grade compression stockings 20-30 mmHg. On a.m., off in p.m. Frequent ambulation   Leg elevation at rest   Moisturizer and diligent skin care to maintain skin integrity and prevent skin breakdown    Bilateral lower extremity venous duplex with reflux  Return to office after imaging with vascular surgeon to review make further recommendations if indicated    Recommend medical sclerotherapy injections for left medial ankle bleeding veins. We will look into insurance authorization/approval.    Varicose Veins   AMBULATORY CARE:   Varicose veins  are veins that become large, twisted, and swollen. They are common on the back of the calves, knees, and thighs. Varicose veins are caused by valves in your veins that do not work properly. This causes blood to collect and increase pressure in the veins of your legs. The increased pressure causes your veins to stretch, get larger, swell, and twist.       Common symptoms include the following: Your symptoms may be worse after you stand or sit for long periods of time. You may have any of the following:  Blue, purple, or bulging veins in your legs     Pain, swelling, or muscle cramps in your legs    Feeling of fatigue or heaviness in your legs    Cramping in your legs    Seek care immediately if:   You have a wound that does not heal or is infected. You have an injury that has broken your skin and caused your varicose veins to bleed. Your leg is swollen and hard. You notice that your legs or feet are turning blue or black. Your leg feels warm, tender, and painful. It may look swollen and red. Contact your healthcare provider if:   You have pain in your leg that does not go away or gets worse. You notice sudden large bruising on your legs. You have a rash on your leg. Your symptoms keep you from doing your daily activities.     You have questions or concerns about your condition or care.    Treatment of varicose veins  aims to decrease symptoms, improve appearance, and prevent further problems. Treatment will depend on which veins are affected and how severe your condition is. You may need procedures to treat or remove your varicose veins. For example, your healthcare provider may inject a solution or use a laser to close the varicose veins. Surgery to remove long veins may also be done. Ask your healthcare provider for more information about procedures used to treat varicose veins. Manage varicose veins:   Do not sit or stand for long periods of time. This can cause the blood to collect in your legs and make your symptoms worse. Bend or rotate your ankles several times every hour. Walk around for a few minutes every hour to get blood moving in your legs. Do not cross your legs when you sit. This decreases blood flow to your feet and can make your symptoms worse. Do not wear tight clothing or shoes. Do not wear high-heeled shoes. Do not wear clothes that are tight around the waist or knees. Maintain a healthy weight. Being overweight or obese can make your varicose veins worse. Ask your healthcare provider how much you should weigh. Ask him or her to help you create a weight loss plan if you are overweight. Wear pressure stockings as directed. The stockings are tight and put pressure on your legs. They improve blood flow and help prevent clots. Elevate your legs. Keep them above the level of your heart for 15 to 30 minutes several times a day. You can also prop the end of your bed up slightly to elevate your legs while you sleep. This will help blood to flow back to your heart. Get regular exercise. Talk to your healthcare provider about the best exercise plan for you. Exercise can improve blood flow to your legs and feet. Follow up with your doctor as directed:  Write down your questions so you remember to ask them during your visits.   © Copyright [employer] 2022 Information is for End User's use only and may not be sold, redistributed or otherwise used for commercial purposes. The above information is an  only. It is not intended as medical advice for individual conditions or treatments. Talk to your doctor, nurse or pharmacist before following any medical regimen to see if it is safe and effective for you.

## 2023-09-12 NOTE — ASSESSMENT & PLAN NOTE
71 yo female with HTN, GERD, asthma, RA/OA and anxiety presents with c/o b/l symptomatic varicose veins and h/o ruptured bleeding veins.    -Presents with BLE large, dilated varicosities. -Reports she has had varicose veins "forever" more recent becoming symptomatic with throbbing pain. -Reports left medial ankle with 2 varicosities (distal) that have ruptured, bleeding events and one more proximal impending rupture. Patient did not report to ED, she was able to control bleeding on her own. Varicosities have ruptured multiple times.  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 -Reports family history of varicose veins on maternal side.  -Palpable distal DP pulses bilaterally. Plan:  -Continued conservative management with daily use of medical grade compression stockings 20-30 mmHg. On a.m., off in p.m.  -Frequent ambulation, leg elevation rest  --Bilateral LE VDR  -Return to office with vascular surgeon to review imaging and make further recommendations if indicated  -Medical sclerotherapy injections to left medial ankle varicosities s/p bleeding event, rupture. Will await authorization  -In the event of ruptured bleeding vein in the interim, hold pressure and elevate. If unable to control bleeding.   Report to ED

## 2023-09-12 NOTE — PROGRESS NOTES
Assessment/Plan:    Varicose veins of bilateral lower extremities with other complications  71 yo female with HTN, GERD, asthma, RA/OA and anxiety presents with c/o b/l symptomatic varicose veins and h/o ruptured bleeding veins.    -Presents with BLE large, dilated varicosities. -Reports she has had varicose veins "forever" more recent becoming symptomatic with throbbing pain. -Reports left medial ankle with 2 varicosities (distal) that have ruptured, bleeding events and one more proximal impending rupture. Patient did not report to ED, she was able to control bleeding on her own. Varicosities have ruptured multiple times.  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 -Reports family history of varicose veins on maternal side.  -Palpable distal DP pulses bilaterally. Plan:  -Continued conservative management with daily use of medical grade compression stockings 20-30 mmHg. On a.m., off in p.m.  -Frequent ambulation, leg elevation rest  --Bilateral LE VDR  -Return to office with vascular surgeon to review imaging and make further recommendations if indicated  -Medical sclerotherapy injections to left medial ankle varicosities s/p bleeding event, rupture. Will await authorization  -In the event of ruptured bleeding vein in the interim, hold pressure and elevate. If unable to control bleeding. Report to ED       Diagnoses and all orders for this visit:    Varicose veins of bilateral lower extremities with other complications  -     Ambulatory Referral to Vascular Surgery  -     VAS reflux lower limb venous duplex study with reflux assessment, complete bilateral; Future          Subjective:      Patient ID: Nicole Jenkins is a 70 y.o. female. New patient presenting for evaluation of bilateral LE bulging varicosities accompanied by pain and swelling.  Pt also reports 2 veins that bleed occasionally after a shower. She is wearing compression and elevating her legs daily. Pt is not a smoker. HPI  See assessment and plan      Tisha Conrad is seen for evaluation of: [x]Varicose veins/legs  []Spider veins/legs  []Spider veins/face  []Venous stasis ulcer  []Superficial thrombophlebitis  []Other -      She complains of []none  [x]bulging veins  [x]dilated veins  [x]discolored veins         There is []no edema              []right leg edema  []left leg edema       [x]bilateral lower extremity edema     There is   []no leg pain          []right leg pain  []left leg pain         [x]bilateral leg pain  []bilateral leg pain; L>R   []bilateral leg pain; R>L     Pain is described as [x]aching              []itching  []sharp                []burning  [x]throbbing         []stinging  []heavy                []dull  []other -      Symptoms have been ongoing for:  Many years   There is  []no pertinent medical history  []history of DVT  []PE  []superficial venous thrombosis  Bleeding veins   Prior treatment includes []none  []EVLT  []OTC stockings  [x]prescription compression stockings  []vein ligation  []vein stripping  []stab phlebectomy  []sclerotherapy injections  []Other -      Current treatment includes []none  [x]compression socks  []avoiding tight clothing  []leg elevation  []rest  []exercise  []weight management  []skin care  []periodic evaluation   []Other-     Treatment has been []effective  [x]ineffective     The following portions of the patient's history were reviewed and updated as appropriate: allergies, current medications, past family history, past medical history, past social history, past surgical history and problem list.    Review of Systems   Cardiovascular: Positive for leg swelling. Musculoskeletal:        BLE pain   Skin: Positive for color change. BLE varicose veins   All other systems reviewed and are negative.       I have reviewed and made appropriate changes to the review of systems input by the medical assistant. Objective:      /92 (BP Location: Left arm, Patient Position: Sitting, Cuff Size: Standard)   Pulse 73   Ht 5' 5.5" (1.664 m)   Wt 63.5 kg (140 lb)   LMP  (LMP Unknown)   SpO2 98%   BMI 22.94 kg/m²          Physical Exam  Vitals reviewed. Constitutional:       General: She is not in acute distress. Cardiovascular:      Rate and Rhythm: Normal rate. Pulses: Normal pulses. Dorsalis pedis pulses are 2+ on the right side and 2+ on the left side. Posterior tibial pulses are 2+ on the right side and 2+ on the left side. Pulmonary:      Effort: Pulmonary effort is normal. No respiratory distress. Musculoskeletal:         General: Normal range of motion. Skin:     General: Skin is warm. Capillary Refill: Capillary refill takes less than 2 seconds. Comments: BLE large, dilated varicosities. Clinical picture below. Left medial ankle with superficial dilated small varicosities, purple , impending rupture   Neurological:      Mental Status: She is alert and oriented to person, place, and time. Sensory: No sensory deficit. Motor: No weakness. Psychiatric:         Behavior: Behavior normal.             RIGHT    RIGHT        LEFT    3 AREAS FOR MEDICAL SCLERO INJECTIONS  L medial ankle      I have spent a total time of 35 minutes on 09/12/23 in caring for this patient including Risks and benefits of tx options, Instructions for management, Patient and family education, Importance of tx compliance, Impressions, Counseling / Coordination of care, Documenting in the medical record, Reviewing / ordering tests, medicine, procedures   and Obtaining or reviewing history  .       Vitals:    09/12/23 0823   BP: 140/92   BP Location: Left arm   Patient Position: Sitting   Cuff Size: Standard   Pulse: 73   SpO2: 98%   Weight: 63.5 kg (140 lb)   Height: 5' 5.5" (1.664 m)       Patient Active Problem List Diagnosis   • Hypertension   • GERD (gastroesophageal reflux disease)   • Asthma   • Allergic rhinitis   • Anxiety   • Asthma, mild intermittent   • Tinnitus   • Rheumatoid arthritis of multiple sites with negative rheumatoid factor (HCC)   • Primary generalized (osteo)arthritis   • Atrophy of vagina   • Leiomyoma of uterus   • Basal cell carcinoma of skin of nose   • Acute pain of right knee   • Primary osteoarthritis of right knee   • Chronic right shoulder pain   • Basal cell carcinoma, face   • Varicose veins of bilateral lower extremities with other complications       Past Surgical History:   Procedure Laterality Date   • BREAST BIOPSY Left 2002   • BREAST SURGERY     • CARPAL TUNNEL RELEASE Bilateral 10/2008   • DENTAL SURGERY     • DILATION AND CURETTAGE OF UTERUS     • HYSTEROSCOPY      w/D and C on 06 for irregular menses.   Her pathology demonstrated proliferative endometrium   • INDUCED      • MOHS RECONSTRUCTION Right 2019    Procedure: RECONSTRUCTION MOHS DEFECT RIGHT NOSE;  Surgeon: Oscar Goodrich MD;  Location:  MAIN OR;  Service: Plastics   • MOHS RECONSTRUCTION Left 2023    Procedure: RECONSTRUCTION MOHS DEFECT OF LEFT BROW/FOREHEAD WITH LOCAL FLAP;  Surgeon: Oscar Goodrich MD;  [facility];  Service: Plastics   • MN ADJT TIS REARGMT EYE/NOSE/EAR/LIP 10.1-30.0 SQCM Right 2019    Procedure: FLAP LOCAL RIGHT NOSE VS FTSG;  Surgeon: Oscar Goodrich MD;  Location: [facility];  Service: Plastics   • SKIN BIOPSY     • TONSILLECTOMY     • WRIST SURGERY Right        Family History   Problem Relation Age of Onset   • Arthritis Mother    • Hypertension Mother    • Coronary artery disease Mother    • Skin cancer Mother    • Varicose Veins Mother    • Uterine cancer Mother    • Arthritis Father    • Hypertension Father    • Stroke Father    • Coronary artery disease Father    • Other Father         Stent indications   • Ulcerative colitis Sister • Rheum arthritis Maternal Grandmother    • Substance Abuse Neg Hx    • Mental illness Neg Hx        Social History     Socioeconomic History   • Marital status: /Civil Union     Spouse name: Not on file   • Number of children: Not on file   • Years of education: Not on file   • Highest education level: Not on file   Occupational History   • Not on file   Tobacco Use   • Smoking status: Never   • Smokeless tobacco: Never   Vaping Use   • Vaping Use: Never used   Substance and Sexual Activity   • Alcohol use: Yes     Comment: social- 2-3 glasses of wine   • Drug use: No   • Sexual activity: Not on file   Other Topics Concern   • Not on file   Social History Narrative    Caffeine Use 1 cup per day    Per allscripts: Denied: History of [facility]     Social Determinants of Health     Financial Resource Strain: Not on file   Food Insecurity: Not on file   Transportation Needs: Not on file   Physical Activity: Not on file   Stress: Not on file   Social Connections: Not on file   Intimate Partner Violence: Not on file   Housing Stability: Not on file       Allergies   Allergen Reactions   • Other Allergic Rhinitis     Seasonal  Mold         Current Outpatient Medications:   •  albuterol (Ventolin HFA) 90 mcg/act inhaler, Inhale 2 puffs every 6 (six) hours as needed for wheezing or shortness of breath, Disp: 18 g, Rfl: 1  •  Cholecalciferol (VITAMIN D3) 1000 units CHEW, Chew, Disp: , Rfl:   •  fluticasone (FLONASE) 50 mcg/act nasal spray, 2 sprays into each nostril daily, Disp: 48 g, Rfl: 2  •  hydrochlorothiazide (HYDRODIURIL) 25 mg tablet, Take 1 tablet (25 mg total) by mouth daily, Disp: 90 tablet, Rfl: 3  •  Ibuprofen 200 MG CAPS, Take 2 capsules by mouth 2 (two) times a day, Disp: , Rfl:   •  meclizine (ANTIVERT) 25 mg tablet, Take 1 tablet by mouth every 8 (eight) hours as needed for dizziness, Disp: 10 tablet, Rfl: 0  •  multivitamin (THERAGRAN) TABS, Take 1 tablet by mouth daily, Disp: , Rfl:   •  RABEprazole (ACIPHEX) 20 MG tablet, Take 20 mg by mouth daily, Disp: , Rfl:   •  vitamin B-12 (VITAMIN B-12) 1,000 mcg tablet, Take by mouth daily, Disp: , Rfl:

## 2023-09-15 ENCOUNTER — HOSPITAL ENCOUNTER (OUTPATIENT)
Dept: NON INVASIVE DIAGNOSTICS | Facility: HOSPITAL | Age: 63
Discharge: HOME | End: 2023-09-15
Payer: COMMERCIAL

## 2023-09-15 DIAGNOSIS — I83.893 VARICOSE VEINS OF BILATERAL LOWER EXTREMITIES WITH OTHER COMPLICATIONS: ICD-10-CM

## 2023-09-15 PROCEDURE — 93970 EXTREMITY STUDY: CPT

## 2023-09-15 PROCEDURE — 93970 EXTREMITY STUDY: CPT | Performed by: SURGERY

## 2023-10-06 ENCOUNTER — TELEPHONE (OUTPATIENT)
Age: 63
End: 2023-10-06

## 2023-10-06 ENCOUNTER — TELEPHONE (OUTPATIENT)
Dept: VASCULAR SURGERY | Facility: CLINIC | Age: 63
End: 2023-10-06

## 2023-10-06 NOTE — TELEPHONE ENCOUNTER
Pt called the office to ask if it is "normal" to have a lot of pain after the LEVDR from 9/15. Pt states that right after she had the study done she developed bilateral groin pain and it is not getting any better. Pt states that the pain is constant and sometimes as bad as 8/10. She denies any increased swelling or redness. Pt has been taking advil, which helps a little. Informed her I would send a message to our triage provider and we will call her back.

## 2023-10-06 NOTE — TELEPHONE ENCOUNTER
Is there any bruising of the area? Has she tried warm compresses? I suppose some discomfort after LEVDR can occur for a few days after, however patient had her study 3 weeks ago and pain should be improved by now. Can consider obtaining bilateral LEV to rule out blood clot, however this may cause increased discomfort in the groin again.

## 2023-10-06 NOTE — TELEPHONE ENCOUNTER
Caller: Patient    Doctor: Himanshu Sweeney    Reason for call: patient called in stating they stopped taking the meloxicam because it was making them swell    Call back#: [phone]

## 2023-10-09 ENCOUNTER — HOSPITAL ENCOUNTER (OUTPATIENT)
Dept: BONE DENSITY | Facility: MEDICAL CENTER | Age: 63
Discharge: HOME | End: 2023-10-09
Payer: COMMERCIAL

## 2023-10-09 DIAGNOSIS — Z78.0 POSTMENOPAUSAL: ICD-10-CM

## 2023-10-09 DIAGNOSIS — Z13.820 SCREENING FOR OSTEOPOROSIS: ICD-10-CM

## 2023-10-09 PROCEDURE — 77080 DXA BONE DENSITY AXIAL: CPT

## 2023-10-17 ENCOUNTER — CLINICAL SUPPORT (OUTPATIENT)
Dept: VASCULAR SURGERY | Facility: CLINIC | Age: 63
End: 2023-10-17
Payer: COMMERCIAL

## 2023-10-17 VITALS
HEART RATE: 88 BPM | WEIGHT: 138 LBS | DIASTOLIC BLOOD PRESSURE: 100 MMHG | RESPIRATION RATE: 20 BRPM | SYSTOLIC BLOOD PRESSURE: 176 MMHG | BODY MASS INDEX: 22.18 KG/M2 | HEIGHT: 66 IN

## 2023-10-17 DIAGNOSIS — I83.893 VARICOSE VEINS OF BILATERAL LOWER EXTREMITIES WITH OTHER COMPLICATIONS: Primary | ICD-10-CM

## 2023-10-17 PROCEDURE — 36470 NJX SCLRSNT 1 INCMPTNT VEIN: CPT | Performed by: NURSE PRACTITIONER

## 2023-10-17 NOTE — PROGRESS NOTES
SCLEROTHERAPY PROCEDURE NOTE -  VASCULAR      Assessment/Plan:   A: 49-year-old female with HTN, GERD, asthma, RA/OA, anxiety, and BLE symptomatic varicose veins with history of left medial ankle rupture/bleeding varicosity presents for medical sclerotherapy injections. P: Medical sclerotherapy injections w/ Asclera 1% solution/foam to left medial ankle dilated varicosity with history of rupture/bleeding       There are no diagnoses linked to this encounter. Subjective:      Patient ID: Carlos Cortes is a 70 y.o. female    Patient presents to the office to have [address] [facility] [address] therapy for bleeding varicose veins. Pt denies any new bleeding episodes since last office visit. Indications for Procedure: Patient presents with Left medial distal leg/ ankle dilated reticular veins and telangiectasia on the legs. Objective      Exam: On bilateral lower extremities, dilated reticular veins and telangiectasias are present symmetrically without findings of chronic venous insufficiency. Superficial prominent veins. Patient advised or risks of pain upon injection, redness and swelling after treatment, bruising and bleeding, necrosis/ulceration, allergy, discoloration and the likelihood that multiple treatments may be necessary and clearance may not be complete. Affected areas on the L medial distal leg were treated with intravascular injections of 4 cc of  1% Asclera using a 32G syringe per ’s protocol. The patient tolerated the procedure well with minimal erythema and edema. Immediate pressure was applied with cotton balls secured with tape at the injection sites. Compression stockings of 20-30mm Hg were applied to the treated legs. The patient was advised to wear compression stockings during the day for the next 2- 3weeks.      Follow up in 4-5 weeks

## 2023-10-17 NOTE — PATIENT INSTRUCTIONS
Keep cotton ball pressure dressing and compression stockings in place x 36-48 hrs. (sleep with them for 2 nights) Then you may remove everything and take a shower. Then, wear compression stockings daily. On a.m., off in p.m. Encourage ambulation and leg elevation at rest   You may resume exercise activities in 1 week while wearing compression. Your legs might look worse before better, veins may look darker.     Bruising can be normal  Call if you are experiencing any pain  Return to office in 4-5 weeks for post injection follow-up

## 2023-11-24 ENCOUNTER — HOSPITAL ENCOUNTER (OUTPATIENT)
Dept: ULTRASOUND IMAGING | Facility: HOSPITAL | Age: 63
Discharge: HOME | End: 2023-11-24
Payer: COMMERCIAL

## 2023-11-24 DIAGNOSIS — M79.641 BILATERAL HAND PAIN: ICD-10-CM

## 2023-11-24 DIAGNOSIS — M79.642 BILATERAL HAND PAIN: ICD-10-CM

## 2023-11-24 PROCEDURE — 76882 US LMTD JT/FCL EVL NVASC XTR: CPT

## 2023-11-28 ENCOUNTER — OFFICE VISIT (OUTPATIENT)
Dept: VASCULAR SURGERY | Facility: CLINIC | Age: 63
End: 2023-11-28

## 2023-11-28 VITALS
BODY MASS INDEX: 22.66 KG/M2 | RESPIRATION RATE: 18 BRPM | OXYGEN SATURATION: 98 % | DIASTOLIC BLOOD PRESSURE: 90 MMHG | HEART RATE: 82 BPM | WEIGHT: 141 LBS | SYSTOLIC BLOOD PRESSURE: 148 MMHG | HEIGHT: 66 IN

## 2023-11-28 DIAGNOSIS — I83.893 VARICOSE VEINS OF BILATERAL LOWER EXTREMITIES WITH OTHER COMPLICATIONS: Primary | ICD-10-CM

## 2023-11-28 PROCEDURE — 99024 POSTOP FOLLOW-UP VISIT: CPT | Performed by: NURSE PRACTITIONER

## 2023-11-28 NOTE — ASSESSMENT & PLAN NOTE
69 yo female with HTN, GERD, asthma, RA/OA, anxiety and BLE venous insuffiency and varicose veins with h/o L medial ankle rupture/ bleeding vein now s/p L medial distal leg/ ankle medical sclerotherapy x 2 presents for post injection follow-up. - L medial injected veins closed, thrombosed. -Patient reports proximal injected vein developed blood blister and "popped". Dry, non-infected appearing. No erythema or drainage. Surrounding reticular veins appear thrombosed. -Distal injected vein with dry, black scab  -Palpable distal pulses    Plan:  -No additional medical sclerotherapy injections indicated. -Continue conservative management with daily use of medical grade compression stockings 20-30 mmHg.   On a.m., off in p.m.  -Frequent ambulation, leg elevation rest  -Follow-up with vascular surgeon to review LEVDR to make further recommendations if indicated Yes

## 2023-11-28 NOTE — PATIENT INSTRUCTIONS
No additional medical sclerotherapy injections indicated at this time for bleeding veins. Continue to monitor injected areas for redness, drainage, or signs infection. Call with any changes, questions or concerns. Continue conservative management with daily compression, frequent ambulation, and leg elevation at rest.    Follow-up with vascular surgeon to review imaging to make further recommendations if indicated.

## 2023-11-28 NOTE — PROGRESS NOTES
Assessment/Plan:    Varicose veins of bilateral lower extremities with other complications  69 yo female with HTN, GERD, asthma, RA/OA, anxiety and BLE venous insuffiency and varicose veins with h/o L medial ankle rupture/ bleeding vein now s/p L medial distal leg/ ankle medical sclerotherapy x 2 presents for post injection follow-up. - L medial injected veins closed, thrombosed. -Patient reports proximal injected vein developed blood blister and "popped". Dry, non-infected appearing. No erythema or drainage. Surrounding reticular veins appear thrombosed. -Distal injected vein with dry, black scab  -Palpable distal pulses    Plan:  -No additional medical sclerotherapy injections indicated. -Continue conservative management with daily use of medical grade compression stockings 20-30 mmHg. On a.m., off in p.m.  -Frequent ambulation, leg elevation rest  -Follow-up with vascular surgeon to review LEVDR to make further recommendations if indicated       Diagnoses and all orders for this visit:    Varicose veins of bilateral lower extremities with other complications          Subjective:      Patient ID: Amol Herrera is a 70 y.o. female. Patient states that the lump in the Lt medial leg increased in size and ruptured. Pt is wearing OTC compression stockings. HPI    The following portions of the patient's history were reviewed and updated as appropriate: allergies, current medications, past family history, past medical history, past social history, past surgical history, and problem list.    Review of Systems   Constitutional: Negative. HENT: Negative. Eyes: Negative. Respiratory: Negative. Cardiovascular: Negative. Gastrointestinal: Negative. Endocrine: Negative. Genitourinary: Negative. Musculoskeletal: Negative. Skin: Negative. Allergic/Immunologic: Negative. Neurological: Negative. Hematological: Negative. Psychiatric/Behavioral: Negative. Objective:      /90 (BP Location: Left arm, Patient Position: Sitting)   Pulse 82   Resp 18   Ht 5' 5.5" (1.664 m)   Wt 64 kg (141 lb)   LMP  (LMP Unknown)   SpO2 98%   BMI 23.11 kg/m²          Physical Exam  Vitals reviewed. Constitutional:       General: She is not in acute distress. Cardiovascular:      Rate and Rhythm: Normal rate. Pulses: Normal pulses. Dorsalis pedis pulses are 2+ on the right side and 2+ on the left side. Posterior tibial pulses are 2+ on the right side and 2+ on the left side. Pulmonary:      Effort: Pulmonary effort is normal. No respiratory distress. Musculoskeletal:         General: Normal range of motion. Right lower leg: Edema present. Left lower leg: Edema present. Skin:     General: Skin is warm. Capillary Refill: Capillary refill takes less than 2 seconds. Comments: BLE large, dilated varicosities. Clinical picture in media. Left medial ankle injected veins thrombosed. Proximal injected site, dry s/p blister. Distal injected area, dry, black scab. Clinical picture below. Neurological:      Mental Status: She is alert and oriented to person, place, and time. Sensory: No sensory deficit. Motor: No weakness. Psychiatric:         Behavior: Behavior normal.           I have spent a total time of 25 minutes on 11/28/23 in caring for this patient including Instructions for management, Patient and family education, Importance of tx compliance, Impressions, Counseling / Coordination of care, Documenting in the medical record, Reviewing / ordering tests, medicine, procedures  , and Obtaining or reviewing history  .       Vitals:    11/28/23 1440   BP: 148/90   BP Location: Left arm   Patient Position: Sitting   Pulse: 82   Resp: 18   SpO2: 98%   Weight: 64 kg (141 lb)   Height: 5' 5.5" (1.664 m)       Patient Active Problem List   Diagnosis    Hypertension    GERD (gastroesophageal reflux disease) Asthma    Allergic rhinitis    Anxiety    Asthma, mild intermittent    Tinnitus    Rheumatoid arthritis of multiple sites with negative rheumatoid factor (HCC)    Primary generalized (osteo)arthritis    Atrophy of vagina    Leiomyoma of uterus    Basal cell carcinoma of skin of nose    Acute pain of right knee    Primary osteoarthritis of right knee    Chronic right shoulder pain    Basal cell carcinoma, face    Varicose veins of bilateral lower extremities with other complications       Past Surgical History:   Procedure Laterality Date    BREAST BIOPSY Left 2002    BREAST SURGERY      CARPAL TUNNEL RELEASE Bilateral 10/2008    DENTAL SURGERY      DILATION AND CURETTAGE OF UTERUS      HYSTEROSCOPY      w/D and C on 06 for irregular menses.   Her pathology demonstrated proliferative endometrium    INDUCED       MOHS RECONSTRUCTION Right 2019    Procedure: RECONSTRUCTION MOHS DEFECT RIGHT NOSE;  Surgeon: Geno Mullen MD;  [facility];  Service: Plastics    MOHS RECONSTRUCTION Left 2023    Procedure: RECONSTRUCTION MOHS DEFECT OF LEFT BROW/FOREHEAD WITH LOCAL FLAP;  Surgeon: Geno Mullen MD;  [facility];  Service: Plastics    SD ADJT TIS REARGMT EYE/NOSE/EAR/LIP 10.1-30.0 SQCM Right 2019    Procedure: FLAP LOCAL RIGHT NOSE VS FTSG;  Surgeon: Geno Mullen MD;  Location: [facility];  Service: Plastics    SKIN BIOPSY      TONSILLECTOMY      WRIST SURGERY Right        Family History   Problem Relation Age of Onset    Arthritis Mother     Hypertension Mother     Coronary artery disease Mother     Skin cancer Mother     Varicose Veins Mother     Uterine cancer Mother     Arthritis Father     Hypertension Father     Stroke Father     Coronary artery disease Father     Other Father         Stent indications    Ulcerative colitis Sister     Rheum arthritis Maternal Grandmother     Substance Abuse Neg Hx     Mental illness Neg Hx        Social History Socioeconomic History    Marital status: /Civil Union     Spouse name: Not on file    Number of children: Not on file    Years of education: Not on file    Highest education level: Not on file   Occupational History    Not on file   Tobacco Use    Smoking status: Never    Smokeless tobacco: Never   Vaping Use    Vaping Use: Never used   Substance and Sexual Activity    Alcohol use: Yes     Comment: social- 2-3 glasses of wine    Drug use: No    Sexual activity: Not on file   Other Topics Concern    Not on file   Social History Narrative    Caffeine Use 1 cup per day    Per allscripts: Denied: History of [facility]     Social Determinants of Health     Financial Resource Strain: Not on file   Food Insecurity: Not on file   Transportation Needs: Not on file   Physical Activity: Not on file   Stress: Not on file   Social Connections: Not on file   Intimate Partner Violence: Not on file   Housing Stability: Not on file       Allergies   Allergen Reactions    Other Allergic Rhinitis     Seasonal  Mold         Current Outpatient Medications:     albuterol (Ventolin HFA) 90 mcg/act inhaler, Inhale 2 puffs every 6 (six) hours as needed for wheezing or shortness of breath, Disp: 18 g, Rfl: 1    Cholecalciferol (VITAMIN D3) 1000 units CHEW, Chew, Disp: , Rfl:     fluticasone (FLONASE) 50 mcg/act nasal spray, 2 sprays into each nostril daily, Disp: 48 g, Rfl: 2    hydrochlorothiazide (HYDRODIURIL) 25 mg tablet, Take 1 tablet (25 mg total) by mouth daily, Disp: 90 tablet, Rfl: 3    Ibuprofen 200 MG CAPS, Take 2 capsules by mouth 2 (two) times a day, Disp: , Rfl:     meclizine (ANTIVERT) 25 mg tablet, Take 1 tablet by mouth every 8 (eight) hours as needed for dizziness, Disp: 10 tablet, Rfl: 0    multivitamin (THERAGRAN) TABS, Take 1 tablet by mouth daily, Disp: , Rfl:     RABEprazole (ACIPHEX) 20 MG tablet, Take 20 mg by mouth daily, Disp: , Rfl:     vitamin B-12 (VITAMIN B-12) 1,000 mcg tablet, Take by mouth daily, Disp: , Rfl:

## 2023-12-04 ENCOUNTER — OFFICE VISIT (OUTPATIENT)
Dept: RHEUMATOLOGY | Facility: CLINIC | Age: 63
End: 2023-12-04
Payer: COMMERCIAL

## 2023-12-04 VITALS
HEIGHT: 66 IN | SYSTOLIC BLOOD PRESSURE: 138 MMHG | BODY MASS INDEX: 22.92 KG/M2 | WEIGHT: 142.6 LBS | DIASTOLIC BLOOD PRESSURE: 80 MMHG

## 2023-12-04 DIAGNOSIS — M79.641 BILATERAL HAND PAIN: ICD-10-CM

## 2023-12-04 DIAGNOSIS — M79.642 BILATERAL HAND PAIN: ICD-10-CM

## 2023-12-04 DIAGNOSIS — D72.819 LEUKOPENIA, UNSPECIFIED TYPE: ICD-10-CM

## 2023-12-04 DIAGNOSIS — D80.1 HYPOGAMMAGLOBULINEMIA (HCC): ICD-10-CM

## 2023-12-04 DIAGNOSIS — M06.00 SERONEGATIVE RHEUMATOID ARTHRITIS (HCC): ICD-10-CM

## 2023-12-04 DIAGNOSIS — E55.9 VITAMIN D DEFICIENCY: ICD-10-CM

## 2023-12-04 DIAGNOSIS — M15.0 PRIMARY GENERALIZED (OSTEO)ARTHRITIS: ICD-10-CM

## 2023-12-04 DIAGNOSIS — M81.0 AGE-RELATED OSTEOPOROSIS WITHOUT CURRENT PATHOLOGICAL FRACTURE: Primary | ICD-10-CM

## 2023-12-04 PROCEDURE — 99215 OFFICE O/P EST HI 40 MIN: CPT | Performed by: PHYSICIAN ASSISTANT

## 2023-12-04 NOTE — PROGRESS NOTES
Assessment and Plan:   Ms. Alex Toribio is a 40-year-old female who presents for follow-up of seronegative RA, OA, and osteoporosis. At her last visit in August 2023, it was discussed that she was previously evaluated by [facility] rheumatology in 2018 and diagnosed with seronegative rheumatoid arthritis. In order to differentiate between osteoarthritic changes and inflammatory arthritis, ultrasound of the bilateral hands and wrists was ordered and she was recommended to discontinue OTC NSAIDs and trial meloxicam 15 mg once daily. [facility] ultrasound bilateral hands and wrists from 11/20/2023 revealed multifocal areas of synovial proliferation predominantly at the MCP joints bilaterally, multiple osteophytes at the MCP joints, trace joint effusion in the left fourth MCP joint, and small joint effusion with synovial proliferation and prominent marginal osteophytes at the left fifth PIP joint. There is also mild synovial proliferation and mild hyperemia seen along the left wrist.  These findings do suggest inflammatory arthritis such as seronegative RA. Most recent inflammation markers from August 2023 within acceptable limits. We discussed that we can consider the addition of hydroxychloroquine, but first would like to have the patient evaluated by hematology/oncology for chronic leukopenia as well as hypogammaglobulinemia noted on SPEP. We discussed that the chronic leukopenia could possibly be reactive due to history of rheumatoid arthritis, but would prefer to rule out any other etiologies first.  For now, can continue ibuprofen twice daily as needed. The patient also has a history of osteoporosis based on DEXA scan from 2020. Most recent DEXA scan 10/9/2023 reveals osteoporosis with LS T -1.5, left total hip T -2.8, and left FN T -2.3.  10-year risk of hip fracture is 5.4% and 10-year risk of major osteoporotic fracture is 32%. This would warrant treatment of osteoporosis.   We discussed that she would benefit from osteoporosis treatment including IV Reclast once annual infusions, Prolia once every 6 months subcutaneous injections. She will research these options and decide by the time of her next visit. For now, continue calcium and vitamin D. Follow-up in 1 month, or sooner as needed. Plan:  Diagnoses and all orders for this visit:    Age-related osteoporosis without current pathological fracture    Leukopenia, unspecified type  -     Ambulatory Referral to Hematology / Oncology; Future    Hypogammaglobulinemia [facility])  -     Ambulatory Referral to Hematology / Oncology; Future    Seronegative rheumatoid arthritis (HCC)    Bilateral hand pain    Primary generalized (osteo)arthritis    Vitamin D deficiency        I have personally reviewed prior notes, recent laboratory results, and pertinent films in PACS. Activities as tolerated. Exercise: try to maintain a low impact exercise regimen as much as possible. Walk for 30 minutes a day for at least 3 days a week. Continue other medications as prescribed by PCP and other specialists. RTC in 1 month    Rheumatic Disease Summary:  Seronegative RA  -Established care- February, 2016- long standing hx of polyarthritis using Advil.  -Serologies were negative however due to chronic RA deformities, patient was started on HCQ.  -Visit 8/23/23: re-evaluate OA vs RA. Try meloxicam in place of ibuprofen  -[facility] ultrasound bilateral hands and wrists from 11/20/2023 revealed multifocal areas of synovial proliferation predominantly at the MCP joints bilaterally, multiple osteophytes at the MCP joints, trace joint effusion in the left fourth MCP joint, and small joint effusion with synovial proliferation and prominent marginal osteophytes at the left fifth PIP joint.   There is also mild synovial proliferation and mild hyperemia seen along the left wrist.  -Visit 12/4/2023: Re-consider HCQ (previously d/c'd due to non-compliance) after eval from heme/onc for chronic leukopenia and hypogammaglobulinemia. Experienced extremity swelling with meloxicam, therefore cont ibuprofen  2. Osteoporosis  -DEXA 10/9/2023 osteoporosis with LS T -1.5, left total hip T -2.8, and left FN T -2.3.  10-year risk of hip fracture is 5.4% and 10-year risk of major osteoporotic fracture is 32%. -Visit 12/4/2023: We discussed that she would benefit from osteoporosis treatment including IV Reclast once annual infusions, Prolia once every 6 months subcutaneous injections. She will research these options and decide by the time of her next visit. For now, continue calcium and vitamin D.  3. OA  -8/2023 x-rays: Right knee moderate OA, left knee mild OA, bilateral CMC OA, bilateral feet normal, right hip with mild OA     HPI  Ms. Jhony Banda is a 56-year-old female who presents for follow-up of seronegative RA, OA, and osteoporosis. The patient states that she is experiencing pain in her hands. , knees, and feet. She tried the meloxicam for about a month or so, but experienced swelling in the feet and the hands, therefore discontinued the medication and went back to over-the-counter ibuprofen. She is also considering the options for osteoporosis treatments. She would like to research her options. The following portions of the patient's history were reviewed and updated as appropriate: allergies, current medications, past family history, past medical history, past social history, past surgical history and problem list.      Review of Systems  Constitutional: Negative for weight change, fevers, chills, night sweats, fatigue. ENT/Mouth: Negative for hearing changes, ear pain, nasal congestion, sinus pain, hoarseness, sore throat, rhinorrhea, swallowing difficulty. Eyes: Negative for pain, redness, discharge, vision changes. Cardiovascular: Negative for chest pain, SOB, palpitations. Respiratory: Negative for cough, sputum, wheezing, dyspnea.    Gastrointestinal: Negative for nausea, vomiting, diarrhea, constipation, pain, heartburn. Genitourinary: Negative for dysuria, urinary frequency, hematuria. Musculoskeletal: As per HPI. Skin: Negative for skin rash, color changes. Neuro: Negative for weakness, numbness, tingling, loss of consciousness. Psych: Negative for anxiety, depression. Heme/Lymph: Negative for easy bruising, bleeding, lymphadenopathy. Past Medical History:   Diagnosis Date    Abnormal findings on diagnostic imaging of breast     Last assessed 13    Adjustment disorder with depressed mood     Last assessed 13    Anxiety     Arthritis     hands    Erythema migrans (Lyme disease)     Last assessed 13    GERD (gastroesophageal reflux disease)     Polyarthritis     Last assessed 16    Uterine leiomyoma     Vertigo        Past Surgical History:   Procedure Laterality Date    BREAST BIOPSY Left 2002    BREAST SURGERY      CARPAL TUNNEL RELEASE Bilateral 10/2008    DENTAL SURGERY      DILATION AND CURETTAGE OF UTERUS      HYSTEROSCOPY      w/D and C on 06 for irregular menses.   Her pathology demonstrated proliferative endometrium    INDUCED       MOHS RECONSTRUCTION Right 2019    Procedure: RECONSTRUCTION MOHS DEFECT RIGHT NOSE;  Surgeon: Donna Alas MD;  Location[facility];  Service: Plastics    MOHS RECONSTRUCTION Left 2023    Procedure: RECONSTRUCTION MOHS DEFECT OF LEFT BROW/FOREHEAD WITH LOCAL FLAP;  Surgeon: Donna Alas MD;  [facility];  Service: Plastics    DE ADJT TIS REARGMT EYE/NOSE/EAR/LIP 10.1-30.0 SQCM Right 2019    Procedure: FLAP LOCAL RIGHT NOSE VS FTSG;  Surgeon: Donna Alas MD;  Location: [facility];  Service: Plastics    SKIN BIOPSY      TONSILLECTOMY      WRIST SURGERY Right        Social History     Socioeconomic History    Marital status: /Civil Union     Spouse name: Not on file    Number of children: Not on file    Years of education: Not on file Highest education level: Not on file   Occupational History    Not on file   Tobacco Use    Smoking status: Never    Smokeless tobacco: Never   Vaping Use    Vaping Use: Never used   Substance and Sexual Activity    Alcohol use: Yes     Comment: social- 2-3 glasses of wine    Drug use: No    Sexual activity: Not on file   Other Topics Concern    Not on file   Social History Narrative    Caffeine Use 1 cup per day    Per allscripts: Denied: History of [facility]     Social Determinants of Health     Financial Resource Strain: Not on file   Food Insecurity: Not on file   Transportation Needs: Not on file   Physical Activity: Not on file   Stress: Not on file   Social Connections: Not on file   Intimate Partner Violence: Not on file   Housing Stability: Not on file       Family History   Problem Relation Age of Onset    Arthritis Mother     Hypertension Mother     Coronary artery disease Mother     Skin cancer Mother     Varicose Veins Mother     Uterine cancer Mother     Arthritis Father     Hypertension Father     Stroke Father     Coronary artery disease Father     Other Father         Stent indications    Ulcerative colitis Sister     Rheum arthritis Maternal Grandmother     Substance Abuse Neg Hx     Mental illness Neg Hx        Allergies   Allergen Reactions    Other Allergic Rhinitis     Seasonal  Mold         Current Outpatient Medications:     albuterol (Ventolin HFA) 90 mcg/act inhaler, Inhale 2 puffs every 6 (six) hours as needed for wheezing or shortness of breath, Disp: 18 g, Rfl: 1    Cholecalciferol (VITAMIN D3) 1000 units CHEW, Chew, Disp: , Rfl:     fluticasone (FLONASE) 50 mcg/act nasal spray, 2 sprays into each nostril daily, Disp: 48 g, Rfl: 2    hydrochlorothiazide (HYDRODIURIL) 25 mg tablet, Take 1 tablet (25 mg total) by mouth daily, Disp: 90 tablet, Rfl: 3    Ibuprofen 200 MG CAPS, Take 2 capsules by mouth 2 (two) times a day, Disp: , Rfl:     meclizine (ANTIVERT) 25 mg tablet, Take 1 tablet by mouth every 8 (eight) hours as needed for dizziness, Disp: 10 tablet, Rfl: 0    multivitamin (THERAGRAN) TABS, Take 1 tablet by mouth daily, Disp: , Rfl:     RABEprazole (ACIPHEX) 20 MG tablet, Take 20 mg by mouth daily, Disp: , Rfl:     vitamin B-12 (VITAMIN B-12) 1,000 mcg tablet, Take by mouth daily, Disp: , Rfl:       Objective:    Vitals:    12/04/23 1505   BP: 138/80   Weight: 64.7 kg (142 lb 9.6 oz)   Height: 5' 5.5" (1.664 m)       Physical Exam  General: Well appearing, well nourished, in no acute distress. Oriented x 3, normal mood and affect. Ambulating without difficulty. Skin: Good turgor, no rash, unusual bruising or prominent lesions. Hair: Normal texture and distribution. Nails: Normal color, no deformities. HEENT:  Head: Normocephalic, atraumatic. Eyes: Conjunctiva clear, sclera non-icteric, EOM intact. Nose: No external lesions, mucosa non-inflamed. Mouth: Mucous membranes moist, no mucosal lesions. Neck: Supple, thyroid non-enlarged and non-tender. No lymphadenopathy. Heart: Regular rate and rhythm, no murmur or gallop. Lungs: Clear to auscultation, no crackles or wheezing. Abdomen: Soft, non-tender, non-distended, bowel sounds normal.   Extremities: No amputations or deformities, cyanosis, edema. Musculoskeletal:   + Chronic synovial hypertrophy of multiple MCPs bilaterally  + Heberden's nodes and Celi's nodes bilateral hands  No tenderness to palpation or swelling of joints bilaterally to include: shoulder, elbow, wrist, knee, ankle  Neurologic: Alert and oriented. No focal neurological deficits appreciated. Psychiatric: Normal mood and affect.        Margy Gentile PA-C  Rheumatology

## 2023-12-05 ENCOUNTER — TELEPHONE (OUTPATIENT)
Dept: HEMATOLOGY ONCOLOGY | Facility: CLINIC | Age: 63
End: 2023-12-05

## 2023-12-05 ENCOUNTER — OFFICE VISIT (OUTPATIENT)
Dept: FAMILY MEDICINE CLINIC | Facility: CLINIC | Age: 63
End: 2023-12-05
Payer: COMMERCIAL

## 2023-12-05 VITALS
TEMPERATURE: 98.4 F | WEIGHT: 142.8 LBS | RESPIRATION RATE: 16 BRPM | DIASTOLIC BLOOD PRESSURE: 82 MMHG | HEIGHT: 66 IN | OXYGEN SATURATION: 98 % | HEART RATE: 71 BPM | BODY MASS INDEX: 22.95 KG/M2 | SYSTOLIC BLOOD PRESSURE: 130 MMHG

## 2023-12-05 DIAGNOSIS — Z00.00 ANNUAL PHYSICAL EXAM: Primary | ICD-10-CM

## 2023-12-05 DIAGNOSIS — K21.9 GASTROESOPHAGEAL REFLUX DISEASE, UNSPECIFIED WHETHER ESOPHAGITIS PRESENT: ICD-10-CM

## 2023-12-05 DIAGNOSIS — Z23 ENCOUNTER FOR IMMUNIZATION: ICD-10-CM

## 2023-12-05 DIAGNOSIS — I10 ESSENTIAL HYPERTENSION: ICD-10-CM

## 2023-12-05 DIAGNOSIS — M06.09 RHEUMATOID ARTHRITIS OF MULTIPLE SITES WITH NEGATIVE RHEUMATOID FACTOR (HCC): ICD-10-CM

## 2023-12-05 PROCEDURE — 90471 IMMUNIZATION ADMIN: CPT

## 2023-12-05 PROCEDURE — 99396 PREV VISIT EST AGE 40-64: CPT | Performed by: FAMILY MEDICINE

## 2023-12-05 PROCEDURE — 90662 IIV NO PRSV INCREASED AG IM: CPT

## 2023-12-05 RX ORDER — HYDROCHLOROTHIAZIDE 25 MG/1
25 TABLET ORAL DAILY
Qty: 90 TABLET | Refills: 3 | Status: SHIPPED | OUTPATIENT
Start: 2023-12-05

## 2023-12-05 RX ORDER — RABEPRAZOLE SODIUM 20 MG/1
20 TABLET, DELAYED RELEASE ORAL
Qty: 90 TABLET | Refills: 1 | Status: SHIPPED | OUTPATIENT
Start: 2023-12-05

## 2023-12-05 NOTE — TELEPHONE ENCOUNTER
Appointment Schedule   Who are you speaking with? Patient   If it is not the patient, are they listed on an active communication consent form? N/A   Which provider is the appointment scheduled with? JUDI Rios   At which location is the appointment scheduled for? [place]   When is the appointment scheduled? Please list date and time 12/28/23 @ 1pm   What is the reason for this appointment? NP   Did patient voice understanding of the details of this appointment? Yes    Was the no show policy reviewed with patient?  yes

## 2023-12-05 NOTE — PROGRESS NOTES
ADULT ANNUAL PHYSICAL  [id number] [facility]    NAME: Amol Herrera  AGE: 70 y.o. SEX: female  : 1960     DATE: 2023     Assessment and Plan:     Annual physical exam  - influenza vaccine, high-dose, PF 0.7 mL (FLUZONE HIGH-DOSE)    Essential hypertension  - well controlled, continue HCTZ  - hydrochlorothiazide (HYDRODIURIL) 25 mg tablet; Take 1 tablet (25 mg total) by mouth daily  Dispense: 90 tablet; Refill: 3    Rheumatoid arthritis   - follow up with Rheumatology    Gastroesophageal reflux disease  - stable on Aciphex  - RABEprazole (ACIPHEX) 20 MG tablet; Take 1 tablet (20 mg total) by mouth daily before breakfast  Dispense: 90 tablet; Refill: 1     Leukopenia  - follow up with Hem-Onc as scheduled on 23    Osteoporosis  - continue calcium/ vitamin D supplements, follow up with Rheumatology      Immunizations and preventive care screenings were discussed with patient today. Appropriate education was printed on patient's after visit summary. Counseling:  Alcohol/drug use: discussed moderation in alcohol intake, the recommendations for healthy alcohol use, and avoidance of illicit drug use. Dental Health: discussed importance of regular tooth brushing, flossing, and dental visits. Injury prevention: discussed safety/seat belts, safety helmets, smoke detectors, carbon dioxide detectors, and smoking near bedding or upholstery. Exercise: the importance of regular exercise/physical activity was discussed. Recommend exercise 3-5 times per week for at least 30 minutes. Depression Screening and Follow-up Plan: Patient was screened for depression during today's encounter. They screened negative with a PHQ-2 score of 0. Return in about 6 months (around 2024) for Recheck.        Chief Complaint:     Chief Complaint   Patient presents with    Annual Exam      History of Present Illness:     Adult Annual Physical Patient here for a comprehensive physical exam. The patient follows with Rheumatology for RA and OA, she continues having joint aches in her hands, feet, knees and hips. Patient offers no other complaints. Diet and Physical Activity  Diet/Nutrition: well balanced diet. Exercise: walking. Depression Screening  PHQ-2/9 Depression Screening    Little interest or pleasure in doing things: 0 - not at all  Feeling down, depressed, or hopeless: 0 - not at all  PHQ-2 Score: 0  PHQ-2 Interpretation: Negative depression screen       General Health  Sleep: sleeps well and gets 7-8 hours of sleep on average. Hearing: no issues. Vision: goes for regular eye exams and wears glasses. Dental: regular dental visits. /GYN Health   Patient is: postmenopausal     Review of Systems:     Review of Systems   Constitutional:  Negative for appetite change, diaphoresis, fatigue and fever. HENT:  Negative for congestion, ear pain and sore throat. Eyes:  Negative for pain, discharge, redness, itching and visual disturbance. Respiratory:  Negative for cough, shortness of breath and wheezing. Cardiovascular:  Negative for chest pain, palpitations and leg swelling. Gastrointestinal:  Negative for abdominal pain, blood in stool, diarrhea, nausea and vomiting. Endocrine: Negative for cold intolerance, heat intolerance, polydipsia and polyuria. Genitourinary:  Negative for dysuria, frequency, hematuria, pelvic pain and urgency. Musculoskeletal:  Positive for arthralgias. Neurological:  Negative for dizziness, syncope, weakness, numbness and headaches. Hematological:  Negative for adenopathy. Psychiatric/Behavioral:  Negative for dysphoric mood and sleep disturbance. The patient is not nervous/anxious.        Past Medical History:     Past Medical History:   Diagnosis Date    Abnormal findings on diagnostic imaging of breast     Last assessed 08/16/13    Adjustment disorder with depressed mood Last assessed 13    Anxiety     Arthritis     hands    Erythema migrans (Lyme disease)     Last assessed 13    GERD (gastroesophageal reflux disease)     Polyarthritis     Last assessed 16    Uterine leiomyoma     Vertigo       Past Surgical History:     Past Surgical History:   Procedure Laterality Date    BREAST BIOPSY Left 2002    BREAST SURGERY      CARPAL TUNNEL RELEASE Bilateral 10/2008    DENTAL SURGERY      DILATION AND CURETTAGE OF UTERUS      HYSTEROSCOPY      w/D and C on 06 for irregular menses. Her pathology demonstrated proliferative endometrium    INDUCED       MOHS RECONSTRUCTION Right 2019    Procedure: RECONSTRUCTION MOHS DEFECT RIGHT NOSE;  Surgeon: Rubin Sheffield MD;  [facility];  Service: Plastics    MOHS RECONSTRUCTION Left 2023    Procedure: RECONSTRUCTION MOHS DEFECT OF LEFT BROW/FOREHEAD WITH LOCAL FLAP;  Surgeon: Rubin Sheffield MD;  [facility];  Service: Plastics    NC ADJT TIS REARGMT EYE/NOSE/EAR/LIP 10.1-30.0 SQCM Right 2019    Procedure: FLAP LOCAL RIGHT NOSE VS FTSG;  Surgeon: Rubin Sheffield MD;  [facility];  Service: Plastics    SKIN BIOPSY      TONSILLECTOMY      WRIST SURGERY Right       Social History:     Social History     Socioeconomic History    Marital status: /Civil Union     Spouse name: None    Number of children: None    Years of education: None    Highest education level: None   Occupational History    None   Tobacco Use    Smoking status: Never    Smokeless tobacco: Never   Vaping Use    Vaping Use: Never used   Substance and Sexual Activity    Alcohol use:  Yes     Alcohol/week: 3.0 standard drinks of alcohol     Types: 3 Glasses of wine per week     Comment: social- 2-3 glasses of wine    Drug use: No    Sexual activity: Not Currently     Partners: Male     Birth control/protection: None   Other Topics Concern    None   Social History Narrative    Caffeine Use 1 cup per day    Per allscripts: Denied: History of [place]    Orthodox     Social Determinants of Health     Financial Resource Strain: Not on file   Food Insecurity: Not on file   Transportation Needs: Not on file   Physical Activity: Not on file   Stress: Not on file   Social Connections: Not on file   Intimate Partner Violence: Not on file   Housing Stability: Not on file      Family History:     Family History   Problem Relation Age of Onset    Arthritis Mother     Hypertension Mother     Coronary artery disease Mother     Skin cancer Mother     Varicose Veins Mother     Uterine cancer Mother     Arthritis Father     Hypertension Father     Stroke Father     Coronary artery disease Father     Other Father         Stent indications    Ulcerative colitis Sister     Rheum arthritis Maternal Grandmother     Cancer Maternal Uncle     Substance Abuse Neg Hx     Mental illness Neg Hx       Current Medications:     Current Outpatient Medications   Medication Sig Dispense Refill    albuterol (Ventolin HFA) 90 mcg/act inhaler Inhale 2 puffs every 6 (six) hours as needed for wheezing or shortness of breath 18 g 1    Cholecalciferol (VITAMIN D3) 1000 units CHEW Chew      fluticasone (FLONASE) 50 mcg/act nasal spray 2 sprays into each nostril daily 48 g 2    hydrochlorothiazide (HYDRODIURIL) 25 mg tablet Take 1 tablet (25 mg total) by mouth daily 90 tablet 3    Ibuprofen 200 MG CAPS Take 2 capsules by mouth 2 (two) times a day      meclizine (ANTIVERT) 25 mg tablet Take 1 tablet by mouth every 8 (eight) hours as needed for dizziness 10 tablet 0    multivitamin (THERAGRAN) TABS Take 1 tablet by mouth daily      RABEprazole (ACIPHEX) 20 MG tablet Take 1 tablet (20 mg total) by mouth daily before breakfast 90 tablet 1    vitamin B-12 (VITAMIN B-12) 1,000 mcg tablet Take by mouth daily       No current facility-administered medications for this visit. Allergies:      Allergies   Allergen Reactions    Other Allergic Rhinitis     Seasonal  Mold      Physical Exam:     /82   Pulse 71   Temp 98.4 °F (36.9 °C)   Resp 16   Ht 5' 5.5" (1.664 m)   Wt 64.8 kg (142 lb 12.8 oz)   LMP  (LMP Unknown)   SpO2 98%   BMI 23.40 kg/m²     Physical Exam  Constitutional:       General: She is not in acute distress. Appearance: Normal appearance. She is well-developed. HENT:      Right Ear: Tympanic membrane, ear canal and external ear normal.      Left Ear: Tympanic membrane, ear canal and external ear normal.      Mouth/Throat:      Mouth: Mucous membranes are moist.      Pharynx: Oropharynx is clear. Eyes:      General: No scleral icterus. Extraocular Movements: Extraocular movements intact. Conjunctiva/sclera: Conjunctivae normal.      Pupils: Pupils are equal, round, and reactive to light. Neck:      Thyroid: No thyromegaly. Vascular: No JVD. Cardiovascular:      Rate and Rhythm: Normal rate and regular rhythm. Heart sounds: Normal heart sounds. No murmur heard. Pulmonary:      Effort: Pulmonary effort is normal. No respiratory distress. Breath sounds: Normal breath sounds. No wheezing, rhonchi or rales. Abdominal:      General: Bowel sounds are normal. There is no distension. Palpations: Abdomen is soft. There is no mass. Tenderness: There is no abdominal tenderness. Musculoskeletal:      Cervical back: Neck supple. Right lower leg: No edema. Left lower leg: No edema. Lymphadenopathy:      Cervical: No cervical adenopathy. Skin:     Findings: No rash. Neurological:      General: No focal deficit present. Mental Status: She is alert and oriented to person, place, and time. Cranial Nerves: No cranial nerve deficit. Psychiatric:         Mood and Affect: Mood normal.         Behavior: Behavior normal.         Thought Content:  Thought content normal.         Judgment: Judgment normal.          Lab Results   Component Value Date    WBC 3.6 (L) 08/05/2023 HGB 13.4 08/05/2023    HCT 39.0 08/05/2023    .8 (H) 08/05/2023     08/05/2023     Lab Results   Component Value Date     07/01/2017    SODIUM 141 08/05/2023    K 3.7 08/05/2023     08/05/2023    CO2 33 (H) 08/05/2023    AGAP 6 05/20/2023    BUN 14 08/05/2023    CREATININE 0.83 08/05/2023    GLUC 90 08/05/2023    GLUF 98 05/20/2023    CALCIUM 9.3 08/28/2023    AST 22 04/07/2023    ALT 15 04/07/2023    ALKPHOS 75 04/07/2023    PROT 6.6 07/01/2017    TP 6.0 (L) 08/28/2023    BILITOT 0.6 07/01/2017    TBILI 0.8 04/07/2023    EGFR 78 08/05/2023     Lab Results   Component Value Date    CHOLESTEROL 223 (H) 04/07/2023    CHOLESTEROL 197 04/24/2021    CHOLESTEROL 205 (H) 12/08/2018     Lab Results   Component Value Date     04/07/2023    HDL 97 04/24/2021     12/08/2018     Lab Results   Component Value Date    TRIG 66 04/07/2023    TRIG 70 04/24/2021    TRIG 78 12/08/2018     Lab Results   Component Value Date    LDLCALC 90 04/07/2023       Nanda Calvin MD  [address]

## 2023-12-05 NOTE — PATIENT INSTRUCTIONS
Wellness Visit for Adults   AMBULATORY CARE:   A wellness visit  is when you see your healthcare provider to get screened for health problems. Your healthcare provider will also give you advice on how to stay healthy. Write down your questions so you remember to ask them. Ask your healthcare provider how often you should have a wellness visit. What happens at a wellness visit:  Your healthcare provider will ask about your health, and your family history of health problems. This includes high blood pressure, heart disease, and cancer. He or she will ask if you have symptoms that concern you, if you smoke, and about your mood. You may also be asked about your intake of medicines, supplements, food, and alcohol. Any of the following may be done: Your weight  will be checked. Your height may also be checked so your body mass index (BMI) can be calculated. Your BMI shows if you are at a healthy weight. Your blood pressure  and heart rate will be checked. Your temperature may also be checked. Blood and urine tests  may be done. Blood tests may be done to check your cholesterol levels. Abnormal cholesterol levels increase your risk for heart disease and stroke. You may also need a blood or urine test to check for diabetes if you are at increased risk. Urine tests may be done to look for signs of an infection or kidney disease. A physical exam  includes checking your heartbeat and lungs with a stethoscope. Your healthcare provider may also check your skin to look for sun damage. Screening tests  may be recommended. A screening test is done to check for diseases that may not cause symptoms. The screening tests you may need depend on your age, gender, family history, and lifestyle habits. For example, colorectal screening may be recommended if you are 48years old or older. Screening tests you need if you are a woman:   A Pap smear  is used to screen for cervical cancer.  Pap smears are usually done every 3 to 5 years depending on your age. You may need them more often if you have had abnormal Pap smear test results in the past. Ask your healthcare provider how often you should have a Pap smear. A mammogram  is an x-ray of your breasts to screen for breast cancer. Experts recommend mammograms every 2 years starting at age 48 years. You may need a mammogram at age 52 years or younger if you have an increased risk for breast cancer. Talk to your healthcare provider about when you should start having mammograms and how often you need them. Vaccines you may need:   Get an influenza vaccine  every year. The influenza vaccine protects you from the flu. Several types of viruses cause the flu. The viruses change over time, so new vaccines are made each year. Get a tetanus-diphtheria (Td) booster vaccine  every 10 years. This vaccine protects you against tetanus and diphtheria. Tetanus is a severe infection that may cause painful muscle spasms and lockjaw. Diphtheria is a severe bacterial infection that causes a thick covering in the back of your mouth and throat. Get a human papillomavirus (HPV) vaccine  if you are female and aged 23 to 32 or male 23 to 24 and never received it. This vaccine protects you from HPV infection. HPV is the most common infection spread by sexual contact. HPV may also cause vaginal, penile, and anal cancers. Get a pneumococcal vaccine  if you are aged 72 years or older. The pneumococcal vaccine is an injection given to protect you from pneumococcal disease. Pneumococcal disease is an infection caused by pneumococcal bacteria. The infection may cause pneumonia, meningitis, or an ear infection. Get a shingles vaccine  if you are 60 or older, even if you have had shingles before. The shingles vaccine is an injection to protect you from the varicella-zoster virus. This is the same virus that causes chickenpox.  Shingles is a painful rash that develops in people who had chickenpox or have been exposed to the virus. How to eat healthy:  My Plate is a model for planning healthy meals. It shows the types and amounts of foods that should go on your plate. Fruits and vegetables make up about half of your plate, and grains and protein make up the other half. A serving of dairy is included on the side of your plate. The amount of calories and serving sizes you need depends on your age, gender, weight, and height. Examples of healthy foods are listed below:  Eat a variety of vegetables  such as dark green, red, and orange vegetables. You can also include canned vegetables low in sodium (salt) and frozen vegetables without added butter or sauces. Eat a variety of fresh fruits , canned fruit in 100% juice, frozen fruit, and dried fruit. Include whole grains. At least half of the grains you eat should be whole grains. Examples include whole-wheat bread, wheat pasta, brown rice, and whole-grain cereals such as oatmeal.    Eat a variety of protein foods such as seafood (fish and shellfish), lean meat, and poultry without skin (turkey and chicken). Examples of lean meats include pork leg, shoulder, or tenderloin, and beef round, sirloin, tenderloin, and extra lean ground beef. Other protein foods include eggs and egg substitutes, beans, peas, soy products, nuts, and seeds. Choose low-fat dairy products such as skim or 1% milk or low-fat yogurt, cheese, and cottage cheese. Limit unhealthy fats  such as butter, hard margarine, and shortening. Exercise:  Exercise at least 30 minutes per day on most days of the week. Some examples of exercise include walking, biking, dancing, and swimming. You can also fit in more physical activity by taking the stairs instead of the elevator or parking farther away from stores. Include muscle strengthening activities 2 days each week. Regular exercise provides many health benefits.  It helps you manage your weight, and decreases your risk for type 2 diabetes, heart disease, stroke, and high blood pressure. Exercise can also help improve your mood. Ask your healthcare provider about the best exercise plan for you. General health and safety guidelines:   Do not smoke. Nicotine and other chemicals in cigarettes and cigars can cause lung damage. Ask your healthcare provider for information if you currently smoke and need help to quit. E-cigarettes or smokeless tobacco still contain nicotine. Talk to your healthcare provider before you use these products. Limit alcohol. A drink of alcohol is 12 ounces of beer, 5 ounces of wine, or 1½ ounces of liquor. Lose weight, if needed. Being overweight increases your risk of certain health conditions. These include heart disease, high blood pressure, type 2 diabetes, and certain types of cancer. Protect your skin. Do not sunbathe or use tanning beds. Use sunscreen with a SPF 15 or higher. Apply sunscreen at least 15 minutes before you go outside. Reapply sunscreen every 2 hours. Wear protective clothing, hats, and sunglasses when you are outside. Drive safely. Always wear your seatbelt. Make sure everyone in your car wears a seatbelt. A seatbelt can save your life if you are in an accident. Do not use your cell phone when you are driving. This could distract you and cause an accident. Pull over if you need to make a call or send a text message. Practice safe sex. Use latex condoms if are sexually active and have more than one partner. Your healthcare provider may recommend screening tests for sexually transmitted infections (STIs). Wear helmets, lifejackets, and protective gear. Always wear a helmet when you ride a bike or motorcycle, go skiing, or play sports that could cause a head injury. Wear protective equipment when you play sports. Wear a lifejacket when you are on a boat or doing water sports.     © Copyright Loletta Gosselin 2023 Information is for End User's use only and may not be sold, redistributed or otherwise used for commercial purposes. The above information is an  only. It is not intended as medical advice for individual conditions or treatments. Talk to your doctor, nurse or pharmacist before following any medical regimen to see if it is safe and effective for you. other/s/p cath, femoral site

## 2023-12-28 ENCOUNTER — OFFICE VISIT (OUTPATIENT)
Dept: HEMATOLOGY ONCOLOGY | Facility: CLINIC | Age: 63
End: 2023-12-28
Payer: COMMERCIAL

## 2023-12-28 VITALS
OXYGEN SATURATION: 97 % | BODY MASS INDEX: 23.24 KG/M2 | HEIGHT: 66 IN | RESPIRATION RATE: 17 BRPM | DIASTOLIC BLOOD PRESSURE: 84 MMHG | HEART RATE: 70 BPM | WEIGHT: 144.6 LBS | TEMPERATURE: 97.5 F | SYSTOLIC BLOOD PRESSURE: 138 MMHG

## 2023-12-28 DIAGNOSIS — D75.89 MACROCYTOSIS: ICD-10-CM

## 2023-12-28 DIAGNOSIS — D72.819 LEUKOPENIA, UNSPECIFIED TYPE: ICD-10-CM

## 2023-12-28 DIAGNOSIS — E53.8 FOLATE DEFICIENCY: ICD-10-CM

## 2023-12-28 DIAGNOSIS — D80.1 HYPOGAMMAGLOBULINEMIA (HCC): ICD-10-CM

## 2023-12-28 DIAGNOSIS — E53.8 VITAMIN B 12 DEFICIENCY: Primary | ICD-10-CM

## 2023-12-28 PROCEDURE — 99204 OFFICE O/P NEW MOD 45 MIN: CPT | Performed by: NURSE PRACTITIONER

## 2023-12-28 NOTE — PROGRESS NOTES
[facility] [place]  [address] [address]  [phone]  Hematology Ambulatory Consult  Pat Gerber, 1960, [id number]  12/28/2023      Assessment and Plan   1. Leukopenia, unspecified type2. Hypogammaglobulinemia (HCC)  3. Vitamin B 12 deficiency  4. Folate deficiency  5. Macrocytosis   Patient is a 63-year-old female with a history of hypertension, rheumatoid arthritis, GERD, osteoporosis, Arthritis, anxiety who was referred to us for further evaluation of leukopenia.  Most recent CBC and differential is from 8/5/2023, WBC has been intermittently decreased ranging between 3.2-4.5 since at least 2016.  RBC, hemoglobin, platelets and differential are all within normal limits.  Macrocytosis is also present with an MCV of 100.8-101.5.  Basic metabolic panel shows a creatinine of 0.83, EGFR 78, otherwise normal.  Patient has had workup including inflammatory markers, CRP, Sjogren's antibody, ESTEBAN, cyclic citral peptide antibody, and vitamin deficiencies that have been fairly stable.  Patient does not report frequent infections, weight loss, night sweats that drenched sheets, or fevers.  She is up-to-date on mammogram and colonoscopy.  Overall she is able to function without restriction.  Patient did have an SPEP that showed hypogammaglobulinemia.   We discussed etiology of leukopenia includes nutritional deficiencies, alcohol use, medications, inflammatory processes, versus an underlying bone marrow process.  Overall patient is able to function without restriction.  We will request the following labs I suspect leukopenia is likely secondary to hypogammaglobulinanemia versus vitamin deficiencies.  We will plan to see her in 1 month to review labs however if stable we will likely push out follow-up to 3 months with repeat blood work.  Patient verbalized understanding and is in agreement with the plan.    - Ambulatory Referral to Hematology /  Oncology  - Vitamin B12; Future  - IgG, IgA, IgM; Future  - Protein electrophoresis, serum; Future  - Peripheral Smear; Future  - CBC and differential; Future  - Comprehensive metabolic panel; Future  - Folate; Future  - Immunoglobulin free LT chains blood; Future    Barrier(s) to care: None.   The patient is  able to self care.    Bhavana LAU  Medical Oncology/Hematology  [facility] [address]    Subjective     Chief Complaint   Patient presents with    Consult     Referring provider    Espinoza Maloney PA-C  [address]  [address]  [place] [address]    History of present illness:   Patient is a 63-year-old female with a history of hypertension, rheumatoid arthritis, GERD, osteoporosis, Arthritis, anxiety who was referred to us for further evaluation of leukopenia.    12/28/23: clinically stable    Review of Systems   Constitutional:  Negative for activity change, appetite change, fatigue, fever and unexpected weight change.   Respiratory:  Negative for cough and shortness of breath.    Cardiovascular:  Negative for chest pain and leg swelling.   Gastrointestinal:  Negative for abdominal pain, constipation, diarrhea and nausea.   Endocrine: Negative for cold intolerance and heat intolerance.   Musculoskeletal:  Negative for arthralgias and myalgias.   Skin: Negative.    Neurological:  Negative for dizziness, weakness and headaches.   Hematological:  Negative for adenopathy. Does not bruise/bleed easily.     Past Medical History:   Diagnosis Date    Abnormal findings on diagnostic imaging of breast     Last assessed 08/16/13    Adjustment disorder with depressed mood     Last assessed 05/17/13    Anxiety     Arthritis     hands    Erythema migrans (Lyme disease)     Last assessed 06/29/13    GERD (gastroesophageal reflux disease)     Polyarthritis     Last assessed 02/17/16    Uterine leiomyoma     Vertigo      Past Surgical History:   Procedure Laterality Date    BREAST BIOPSY Left  2002    BREAST SURGERY      CARPAL TUNNEL RELEASE Bilateral 10/2008    DENTAL SURGERY      DILATION AND CURETTAGE OF UTERUS      HYSTEROSCOPY      w/D and C on 06 for irregular menses.  Her pathology demonstrated proliferative endometrium    INDUCED       MOHS RECONSTRUCTION Right 2019    Procedure: RECONSTRUCTION MOHS DEFECT RIGHT NOSE;  Surgeon: Kamlesh Damon MD;  Location: [facility];  Service: Plastics    MOHS RECONSTRUCTION Left 2023    Procedure: RECONSTRUCTION MOHS DEFECT OF LEFT BROW/FOREHEAD WITH LOCAL FLAP;  Surgeon: Kamlesh Damon MD;  [facility];  Service: Plastics    NY ADJT TIS REARGMT EYE/NOSE/EAR/LIP 10.1-30.0 SQCM Right 2019    Procedure: FLAP LOCAL RIGHT NOSE VS FTSG;  Surgeon: Kamlesh Damon MD;  Location: [facility];  Service: Plastics    SKIN BIOPSY      TONSILLECTOMY      WRIST SURGERY Right      Family History   Problem Relation Age of Onset    Arthritis Mother     Hypertension Mother     Coronary artery disease Mother     Skin cancer Mother     Varicose Veins Mother     Uterine cancer Mother     Arthritis Father     Hypertension Father     Stroke Father     Coronary artery disease Father     Other Father         Stent indications    Ulcerative colitis Sister     Rheum arthritis Maternal Grandmother     Cancer Maternal Uncle     Substance Abuse Neg Hx     Mental illness Neg Hx      Social History     Socioeconomic History    Marital status: /Civil Union     Spouse name: Not on file    Number of children: Not on file    Years of education: Not on file    Highest education level: Not on file   Occupational History    Not on file   Tobacco Use    Smoking status: Never    Smokeless tobacco: Never   Vaping Use    Vaping status: Never Used   Substance and Sexual Activity    Alcohol use: Yes     Alcohol/week: 3.0 standard drinks of alcohol     Types: 3 Glasses of wine per week     Comment: social- 2-3 glasses of wine    Drug use:  "No    Sexual activity: Not Currently     Partners: Male     Birth control/protection: None   Other Topics Concern    Not on file   Social History Narrative    Caffeine Use 1 cup per day    Per allscripts: Denied: History of     [employer]     Social Determinants of Health     Financial Resource Strain: Not on file   Food Insecurity: Not on file   Transportation Needs: Not on file   Physical Activity: Not on file   Stress: Not on file   Social Connections: Not on file   Intimate Partner Violence: Not on file   Housing Stability: Not on file     Current Outpatient Medications:     albuterol (Ventolin HFA) 90 mcg/act inhaler, Inhale 2 puffs every 6 (six) hours as needed for wheezing or shortness of breath, Disp: 18 g, Rfl: 1    Calcium Carbonate (CALCIUM 500 PO), Take by mouth Chewable, Disp: , Rfl:     Cholecalciferol (VITAMIN D3) 1000 units CHEW, Chew, Disp: , Rfl:     fluticasone (FLONASE) 50 mcg/act nasal spray, 2 sprays into each nostril daily, Disp: 48 g, Rfl: 2    hydrochlorothiazide (HYDRODIURIL) 25 mg tablet, Take 1 tablet (25 mg total) by mouth daily, Disp: 90 tablet, Rfl: 3    Ibuprofen 200 MG CAPS, Take 2 capsules by mouth 2 (two) times a day, Disp: , Rfl:     meclizine (ANTIVERT) 25 mg tablet, Take 1 tablet by mouth every 8 (eight) hours as needed for dizziness, Disp: 10 tablet, Rfl: 0    multivitamin (THERAGRAN) TABS, Take 1 tablet by mouth daily, Disp: , Rfl:     RABEprazole (ACIPHEX) 20 MG tablet, Take 1 tablet (20 mg total) by mouth daily before breakfast, Disp: 90 tablet, Rfl: 1    vitamin B-12 (VITAMIN B-12) 1,000 mcg tablet, Take by mouth daily, Disp: , Rfl:     VITAMIN D PO, Take by mouth Vitamin D 500, Disp: , Rfl:   Allergies   Allergen Reactions    Other Allergic Rhinitis     Seasonal  Mold     Objective   /84 (BP Location: Left arm, Patient Position: Sitting, Cuff Size: Adult)   Pulse 70   Temp 97.5 °F (36.4 °C)   Resp 17   Ht 5' 5.5\" (1.664 m)   Wt 65.6 kg (144 lb 9.6 oz) "   LMP  (LMP Unknown)   SpO2 97%   BMI 23.70 kg/m²   Physical Exam  Vitals reviewed.   Constitutional:       Appearance: Normal appearance. She is well-developed.   HENT:      Head: Normocephalic and atraumatic.   Eyes:      Conjunctiva/sclera: Conjunctivae normal.      Pupils: Pupils are equal, round, and reactive to light.   Pulmonary:      Effort: Pulmonary effort is normal. No respiratory distress.   Musculoskeletal:         General: Normal range of motion.      Cervical back: Normal range of motion.   Lymphadenopathy:      Cervical: No cervical adenopathy.   Skin:     General: Skin is dry.   Neurological:      Mental Status: She is alert and oriented to person, place, and time.   Psychiatric:         Behavior: Behavior normal.     Result Review  Labs:  No visits with results within 4 Month(s) from this visit.   Latest known visit with results is:   Orders Only on 08/28/2023   Component Date Value Ref Range Status    Protein, Total 08/28/2023 6.0 (L)  6.1 - 8.1 g/dL Final    Albumin, Electrophoresis 08/28/2023 3.9  3.8 - 4.8 g/dL Final    Alpha-1 Globulin 08/28/2023 0.2  0.2 - 0.3 g/dL Final    Alpha-2 Globulin 08/28/2023 0.5  0.5 - 0.9 g/dL Final    Beta 1 Globulin 08/28/2023 0.4  0.4 - 0.6 g/dL Final    Beta 2 Globulin 08/28/2023 0.4  0.2 - 0.5 g/dL Final    Gamma Globulin 08/28/2023 0.6 (L)  0.8 - 1.7 g/dL Final    Interpretation 08/28/2023    Final    Comment:    Consistent with hypogammaglobulinemia. Serum free light   chains or urine immunofixation should be considered if   plasma cell dyscrasias are a possible clinical   diagnosis.         Hep A Ab, IgM 08/28/2023 NON-REACTIVE  NON-REACTIVE Final    Comment:    For additional information, please refer to   http://education.[employer].RelinkLabs/faq/CCK502   (This link is being provided for informational/  educational purposes only.)         HBsAg Screen 08/28/2023 NON-REACTIVE  NON-REACTIVE Final    Comment:    For additional information, please refer to    http://[employer].Braingaze/faq/OIM177   (This link is being provided for informational/  educational purposes only.)         Hep B Core Ab, IgM 08/28/2023 NON-REACTIVE  NON-REACTIVE Final    Comment:    For additional information, please refer to   http://[employer].Braingaze/faq/KWL142   (This link is being provided for informational/  educational purposes only.)         HEP C AB 08/28/2023 NON-REACTIVE  NON-REACTIVE Final    Comment:    HCV antibody was non-reactive. There is no laboratory   evidence of HCV infection.     In most cases, no further action is required. However,  if recent HCV exposure is suspected, a test for HCV RNA  (test code 38468) is suggested.     For additional information please refer to  http://[employer].Braingaze/faq/IUV96y1  (This link is being provided for informational/  educational purposes only.)         PTH, Intact 08/28/2023 33  16 - 77 pg/mL Final    Comment:    Interpretive Guide    Intact PTH           Calcium  ------------------    ----------           -------  Normal Parathyroid    Normal               Normal  Hypoparathyroidism    Low or Low Normal    Low  Hyperparathyroidism     Primary            Normal or High       High     Secondary          High                 Normal or Low     Tertiary           High                 High  Non-Parathyroid     Hypercalcemia      Low or Low Normal    High         Calcium 08/28/2023 9.3  8.6 - 10.4 mg/dL Final    Magnesium, Serum 08/28/2023 2.1  1.5 - 2.5 mg/dL Final    Phosphorus, Serum 08/28/2023 3.1  2.1 - 4.3 mg/dL Final    Uric Acid 08/28/2023 4.8  2.5 - 7.0 mg/dL Final    Comment: Therapeutic target for gout patients: <6.0 mg/dL          Sedimentation Rate 08/28/2023 2  < OR = 30 mm/h Final    Rheumatoid Factor 08/28/2023 <14  <14 IU/mL Final    C-Reactive Protein, Quant 08/28/2023 1.1  <8.0 mg/L Final    Sjogren's Antibody (SS-A) 08/28/2023 <1.0 NEG  <1.0 NEG AI Final    Sjogren's Antibody (SS-B)  08/28/2023 <1.0 NEG  <1.0 NEG AI Final    Cyclic Citrullinated Peptide (CCP)* 08/28/2023 <16  UNITS Final    Comment: Reference Range  Negative:            <20  Weak Positive:       20-39  Moderate Positive:   40-59  Strong Positive:     >59         Vitamin D, 25-Hydroxy, Serum 08/28/2023 30  30 - 100 ng/mL Final    Comment: Vitamin D Status         25-OH Vitamin D:     Deficiency:                    <20 ng/mL  Insufficiency:             20 - 29 ng/mL  Optimal:                 > or = 30 ng/mL     For 25-OH Vitamin D testing on patients on   D2-supplementation and patients for whom quantitation   of D2 and D3 fractions is required, the QuestAssureD(TM)  25-OH VIT D, (D2,D3), LC/MS/MS is recommended: order   code 69243 (patients >2yrs).     See Note 1     Note 1     For additional information, please refer to   http://education.[employer].KuponGid/faq/UTY595   (This link is being provided for informational/  educational purposes only.)        Please note:  This report has been generated by a voice recognition software system. Therefore there may be syntax, spelling, and/or grammatical errors. Please call if you have any questions.

## 2024-01-08 ENCOUNTER — TELEPHONE (OUTPATIENT)
Dept: HEMATOLOGY ONCOLOGY | Facility: CLINIC | Age: 64
End: 2024-01-08

## 2024-01-08 NOTE — TELEPHONE ENCOUNTER
Patient Call    Who are you speaking with? Patient    If it is not the patient, are they listed on an active communication consent form? N/A   What is the reason for this call? Patient calling in regards to her appointment with Bhavana Elizabeth on 1/25/24.  Patient stated her appointment was scheduled for 2:30pm which worked for patient due to her work schedule.  Patient's appointment was changed to 1:00pm without being notified.  Patient would like to discuss this appointment time change as soon as possible.  Patient states she has a Rheumatology appointment on 1/29/24 at 4:00pm and her rheumatologist wanted patient to see Bhavana first to discuss her labs and then be seen with Rheumatology.  Patient would like a call back to discuss this appointment change.    Does this require a call back? Yes   If a call back is required, please list best call back number [phone], patient gave permission to leave a message   If a call back is required, advise that a message will be forwarded to their care team and someone will return their call as soon as possible.   Did you relay this information to the patient? Yes

## 2024-01-08 NOTE — TELEPHONE ENCOUNTER
Returned call to pt, pt needs 230 appt slot. Advised pt I would work on changing pt appts and she will see updated time in her MyChart. Pt asked that we reach out to her if schedule can't be fixed so that she can reschedule her rheumatology appt.

## 2024-01-10 LAB
ALBUMIN SERPL ELPH-MCNC: 4.2 G/DL (ref 3.8–4.8)
ALBUMIN SERPL-MCNC: 4.3 G/DL (ref 3.6–5.1)
ALBUMIN/GLOB SERPL: 2 (CALC) (ref 1–2.5)
ALP SERPL-CCNC: 88 U/L (ref 37–153)
ALPHA1 GLOB SERPL ELPH-MCNC: 0.2 G/DL (ref 0.2–0.3)
ALPHA2 GLOB SERPL ELPH-MCNC: 0.6 G/DL (ref 0.5–0.9)
ALT SERPL-CCNC: 14 U/L (ref 6–29)
AST SERPL-CCNC: 21 U/L (ref 10–35)
BASOPHILS # BLD AUTO: 40 CELLS/UL (ref 0–200)
BASOPHILS NFR BLD AUTO: 1.3 %
BETA1 GLOB SERPL ELPH-MCNC: 0.5 G/DL (ref 0.4–0.6)
BETA2 GLOB SERPL ELPH-MCNC: 0.4 G/DL (ref 0.2–0.5)
BILIRUB SERPL-MCNC: 0.7 MG/DL (ref 0.2–1.2)
BUN SERPL-MCNC: 10 MG/DL (ref 7–25)
BUN/CREAT SERPL: NORMAL (CALC) (ref 6–22)
CALCIUM SERPL-MCNC: 9.5 MG/DL (ref 8.6–10.4)
CHLORIDE SERPL-SCNC: 98 MMOL/L (ref 98–110)
CO2 SERPL-SCNC: 31 MMOL/L (ref 20–32)
CREAT SERPL-MCNC: 0.76 MG/DL (ref 0.5–1.05)
EOSINOPHIL # BLD AUTO: 99 CELLS/UL (ref 15–500)
EOSINOPHIL NFR BLD AUTO: 3.2 %
ERYTHROCYTE [DISTWIDTH] IN BLOOD BY AUTOMATED COUNT: 11.3 % (ref 11–15)
FOLATE SERPL-MCNC: 12.6 NG/ML
GAMMA GLOB SERPL ELPH-MCNC: 0.6 G/DL (ref 0.8–1.7)
GFR/BSA.PRED SERPLBLD CYS-BASED-ARV: 87 ML/MIN/1.73M2
GLOBULIN SER CALC-MCNC: 2.2 G/DL (CALC) (ref 1.9–3.7)
GLUCOSE SERPL-MCNC: 92 MG/DL (ref 65–99)
HCT VFR BLD AUTO: 39.7 % (ref 35–45)
HGB BLD-MCNC: 14 G/DL (ref 11.7–15.5)
IGA SERPL-MCNC: 289 MG/DL (ref 70–320)
IGG SERPL-MCNC: 726 MG/DL (ref 600–1540)
IGM SERPL-MCNC: 33 MG/DL (ref 50–300)
KAPPA LC FREE SER-MCNC: 14.8 MG/L (ref 3.3–19.4)
KAPPA LC FREE/LAMBDA FREE SER: 1.44 {RATIO} (ref 0.26–1.65)
LAMBDA LC FREE SERPL-MCNC: 10.3 MG/L (ref 5.7–26.3)
LYMPHOCYTES # BLD AUTO: 1039 CELLS/UL (ref 850–3900)
LYMPHOCYTES NFR BLD AUTO: 33.5 %
MCH RBC QN AUTO: 34.8 PG (ref 27–33)
MCHC RBC AUTO-ENTMCNC: 35.3 G/DL (ref 32–36)
MCV RBC AUTO: 98.8 FL (ref 80–100)
MONOCYTES # BLD AUTO: 400 CELLS/UL (ref 200–950)
MONOCYTES NFR BLD AUTO: 12.9 %
NEUTROPHILS # BLD AUTO: 1522 CELLS/UL (ref 1500–7800)
NEUTROPHILS NFR BLD AUTO: 49.1 %
PLATELET # BLD AUTO: 266 THOUSAND/UL (ref 140–400)
PMV BLD REES-ECKER: 9.7 FL (ref 7.5–12.5)
POTASSIUM SERPL-SCNC: 3.9 MMOL/L (ref 3.5–5.3)
PROT SERPL-MCNC: 6.5 G/DL (ref 6.1–8.1)
PROT SERPL-MCNC: 6.5 G/DL (ref 6.1–8.1)
RBC # BLD AUTO: 4.02 MILLION/UL (ref 3.8–5.1)
SODIUM SERPL-SCNC: 136 MMOL/L (ref 135–146)
VIT B12 SERPL-MCNC: 802 PG/ML (ref 200–1100)
WBC # BLD AUTO: 3.1 THOUSAND/UL (ref 3.8–10.8)

## 2024-01-22 ENCOUNTER — TELEPHONE (OUTPATIENT)
Dept: HEMATOLOGY ONCOLOGY | Facility: CLINIC | Age: 64
End: 2024-01-22

## 2024-01-22 NOTE — TELEPHONE ENCOUNTER
Appointment Confirmation   Who are you speaking with? Patient   If it is not the patient, are they listed on an active communication consent form? Yes   Which provider is the appointment scheduled with?  JUDI Garcia   When is the appointment scheduled?  Please list date and time 1/24/24   At which location is the appointment scheduled to take place? [place]   Did caller verbalize understanding of appointment details? Yes

## 2024-01-22 NOTE — TELEPHONE ENCOUNTER
Appointment Change  Cancel, Reschedule, Change to Virtual      Who are you speaking with? Left vm for patient    If it is not the patient, is the caller listed on the communication consent form? N/A   Which provider is the appointment scheduled with? JUDI Garcia   When was the original appointment scheduled?    Please list date and time 01/25/2024 @ 2:30PM    At which location is the appointment scheduled to take place? [place]   Was the appointment rescheduled?     Was the appointment changed from an in person visit to a virtual visit?    If so, please list the details of the change. Yes, 01/24/2024 @2:30PM    What is the reason for the appointment change? provider

## 2024-01-24 ENCOUNTER — OFFICE VISIT (OUTPATIENT)
Dept: HEMATOLOGY ONCOLOGY | Facility: CLINIC | Age: 64
End: 2024-01-24
Payer: COMMERCIAL

## 2024-01-24 VITALS
RESPIRATION RATE: 17 BRPM | HEART RATE: 76 BPM | HEIGHT: 65 IN | SYSTOLIC BLOOD PRESSURE: 136 MMHG | WEIGHT: 142 LBS | BODY MASS INDEX: 23.66 KG/M2 | TEMPERATURE: 97.3 F | DIASTOLIC BLOOD PRESSURE: 78 MMHG | OXYGEN SATURATION: 98 %

## 2024-01-24 DIAGNOSIS — D80.1 HYPOGAMMAGLOBULINEMIA (HCC): ICD-10-CM

## 2024-01-24 DIAGNOSIS — D72.819 LEUKOPENIA, UNSPECIFIED TYPE: Primary | ICD-10-CM

## 2024-01-24 DIAGNOSIS — D75.89 MACROCYTOSIS: ICD-10-CM

## 2024-01-24 PROCEDURE — 99214 OFFICE O/P EST MOD 30 MIN: CPT | Performed by: NURSE PRACTITIONER

## 2024-01-24 NOTE — PROGRESS NOTES
[facility] HEMATOLOGY ONCOLOGY SPECIALISTS [place]  [address] [address]  [phone]  Hematology Ambulatory Follow-Up  Pat Gerber, 1960, [id number]  1/24/2024    Assessment/Plan:    1. Leukopenia, unspecified type  2. Hypogammaglobulinemia (HCC)  3. Macrocytosis   Patient is a 64-year-old female with a history of hypertension, rheumatoid arthritis, GERD, osteoporosis, arthritis, anxiety, and leukopenia who was initially referred for evaluation.  WBC has been intermittently decreased since at least 2016 and presence of microcytosis with remainder of CBC normal.  Inflammatory workup from another provider including rheumatoid factor, CRP, Sjogren's antibody, ESTEBAN, cyclic citral peptide antibody, and vitamin deficiencies have been stable.  Patient does not report frequent infections, weight loss, night sweats, or fevers.   Most recent labs from 1/6/2024 show white blood cell count 3.1, within her range, normal RBC indices, platelets 266, normal differential.  Peripheral smear was within normal limits.  Metabolic panel also stable.  Quantitative immunoglobulins showed a normal IgG and IgA.  IgM was decreased at 33.  Kappa lambda ratio all within normal limits.  Folate 12.6, vitamin B12 802.  Overall patient is able to function without restriction.  She continues to work.  We discussed significance of decreased IgM.  Given patient does not have symptoms or report frequent infections we will monitor.  I suspect it is consistent with hypogammaglobulinemia.  We will see her in 6 months with repeat blood work.  Patient verbalized understanding and is in agreement with the plan.    - CBC and differential; Future  - Comprehensive metabolic panel; Future  - IgG, IgA, IgM; Future  - Immunoglobulin free LT chains blood; Future    Barrier(s) to care: None  The patient is able to self care.    Bhavana LAU  Medical Oncology/Hematology  [facility]  [facility]    Interval history: Clinically stable    Review of Systems   Constitutional:  Negative for activity change, appetite change, fatigue, fever and unexpected weight change.   Respiratory:  Negative for cough and shortness of breath.    Cardiovascular:  Negative for chest pain and leg swelling.   Gastrointestinal:  Negative for abdominal pain, constipation, diarrhea and nausea.   Endocrine: Negative for cold intolerance and heat intolerance.   Musculoskeletal:  Negative for arthralgias and myalgias.   Skin: Negative.    Neurological:  Negative for dizziness, weakness and headaches.   Hematological:  Negative for adenopathy. Does not bruise/bleed easily.     Past Medical History:   Diagnosis Date    Abnormal findings on diagnostic imaging of breast     Last assessed 13    Adjustment disorder with depressed mood     Last assessed 13    Anxiety     Arthritis     hands    Erythema migrans (Lyme disease)     Last assessed 13    GERD (gastroesophageal reflux disease)     Polyarthritis     Last assessed 16    Uterine leiomyoma     Vertigo      Past Surgical History:   Procedure Laterality Date    BREAST BIOPSY Left 2002    BREAST SURGERY      CARPAL TUNNEL RELEASE Bilateral 10/2008    DENTAL SURGERY      DILATION AND CURETTAGE OF UTERUS      HYSTEROSCOPY      w/D and C on 06 for irregular menses.  Her pathology demonstrated proliferative endometrium    INDUCED       MOHS RECONSTRUCTION Right 2019    Procedure: RECONSTRUCTION MOHS DEFECT RIGHT NOSE;  Surgeon: Kamlesh Damon MD;  Location:  MAIN OR;  Service: Plastics    MOHS RECONSTRUCTION Left 2023    Procedure: RECONSTRUCTION MOHS DEFECT OF LEFT BROW/FOREHEAD WITH LOCAL FLAP;  Surgeon: Kamlesh Damon MD;  Location: [facility] MAIN OR;  Service: Plastics    CO ADJT TIS REARGMT EYE/NOSE/EAR/LIP 10.1-30.0 SQCM Right 2019    Procedure: FLAP LOCAL RIGHT NOSE VS FTSG;  Surgeon: Kamlesh Damon MD;   "Location: [facility] OR;  Service: Plastics    SKIN BIOPSY      TONSILLECTOMY      WRIST SURGERY Right 2009     Current Outpatient Medications:     albuterol (Ventolin HFA) 90 mcg/act inhaler, Inhale 2 puffs every 6 (six) hours as needed for wheezing or shortness of breath, Disp: 18 g, Rfl: 1    Calcium Carbonate (CALCIUM 500 PO), Take by mouth Chewable, Disp: , Rfl:     Cholecalciferol (VITAMIN D3) 1000 units CHEW, Chew, Disp: , Rfl:     fluticasone (FLONASE) 50 mcg/act nasal spray, 2 sprays into each nostril daily, Disp: 48 g, Rfl: 2    hydrochlorothiazide (HYDRODIURIL) 25 mg tablet, Take 1 tablet (25 mg total) by mouth daily, Disp: 90 tablet, Rfl: 3    Ibuprofen 200 MG CAPS, Take 2 capsules by mouth 2 (two) times a day, Disp: , Rfl:     meclizine (ANTIVERT) 25 mg tablet, Take 1 tablet by mouth every 8 (eight) hours as needed for dizziness, Disp: 10 tablet, Rfl: 0    multivitamin (THERAGRAN) TABS, Take 1 tablet by mouth daily, Disp: , Rfl:     RABEprazole (ACIPHEX) 20 MG tablet, Take 1 tablet (20 mg total) by mouth daily before breakfast, Disp: 90 tablet, Rfl: 1    vitamin B-12 (VITAMIN B-12) 1,000 mcg tablet, Take by mouth daily, Disp: , Rfl:     VITAMIN D PO, Take by mouth Vitamin D 500, Disp: , Rfl:     Allergies   Allergen Reactions    Other Allergic Rhinitis     Seasonal  Mold     Objective:  /78 (BP Location: Left arm, Patient Position: Sitting, Cuff Size: Adult)   Pulse 76   Temp (!) 97.3 °F (36.3 °C)   Resp 17   Ht 5' 5\" (1.651 m)   Wt 64.4 kg (142 lb)   LMP  (LMP Unknown)   SpO2 98%   BMI 23.63 kg/m²    Physical Exam  Vitals reviewed.   Constitutional:       Appearance: Normal appearance. She is well-developed.   HENT:      Head: Normocephalic and atraumatic.   Eyes:      Conjunctiva/sclera: Conjunctivae normal.      Pupils: Pupils are equal, round, and reactive to light.   Pulmonary:      Effort: Pulmonary effort is normal. No respiratory distress.   Musculoskeletal:         General: Normal " range of motion.      Cervical back: Normal range of motion.   Lymphadenopathy:      Cervical: No cervical adenopathy.   Skin:     General: Skin is dry.   Neurological:      Mental Status: She is alert and oriented to person, place, and time.   Psychiatric:         Behavior: Behavior normal.     Result Review  Labs:  Orders Only on 01/06/2024   Component Date Value Ref Range Status    Protein, Total 01/06/2024 6.5  6.1 - 8.1 g/dL Final    Albumin, Electrophoresis 01/06/2024 4.2  3.8 - 4.8 g/dL Final    Alpha-1 Globulin 01/06/2024 0.2  0.2 - 0.3 g/dL Final    Alpha-2 Globulin 01/06/2024 0.6  0.5 - 0.9 g/dL Final    Beta 1 Globulin 01/06/2024 0.5  0.4 - 0.6 g/dL Final    Beta 2 Globulin 01/06/2024 0.4  0.2 - 0.5 g/dL Final    Gamma Globulin 01/06/2024 0.6 (L)  0.8 - 1.7 g/dL Final    Interpretation 01/06/2024    Final    Comment:    Consistent with hypogammaglobulinemia. Serum free light   chains or urine immunofixation should be considered if   plasma cell dyscrasias are a possible clinical   diagnosis.         Glucose, Random 01/06/2024 92  65 - 99 mg/dL Final    Comment:               Fasting reference interval         BUN 01/06/2024 10  7 - 25 mg/dL Final    Creatinine 01/06/2024 0.76  0.50 - 1.05 mg/dL Final    eGFR 01/06/2024 87  > OR = 60 mL/min/1.73m2 Final    SL AMB BUN/CREATININE RATIO 01/06/2024 SEE NOTE:  6 - 22 (calc) Final    Comment:    Not Reported: BUN and Creatinine are within     reference range.            Sodium 01/06/2024 136  135 - 146 mmol/L Final    Potassium 01/06/2024 3.9  3.5 - 5.3 mmol/L Final    Chloride 01/06/2024 98  98 - 110 mmol/L Final    CO2 01/06/2024 31  20 - 32 mmol/L Final    Calcium 01/06/2024 9.5  8.6 - 10.4 mg/dL Final    Protein, Total 01/06/2024 6.5  6.1 - 8.1 g/dL Final    Albumin 01/06/2024 4.3  3.6 - 5.1 g/dL Final    Globulin 01/06/2024 2.2  1.9 - 3.7 g/dL (calc) Final    Albumin/Globulin Ratio 01/06/2024 2.0  1.0 - 2.5 (calc) Final    TOTAL BILIRUBIN 01/06/2024 0.7   0.2 - 1.2 mg/dL Final    Alkaline Phosphatase 01/06/2024 88  37 - 153 U/L Final    AST 01/06/2024 21  10 - 35 U/L Final    ALT 01/06/2024 14  6 - 29 U/L Final    Pathologist Smear Review 01/06/2024    Final    Comment: Smear review within normal limits.     Claude Velasquez M.D.  [facility], [place], [place]  CLIA:[id number]       White Blood Cell Count 01/06/2024 3.1 (L)  3.8 - 10.8 Thousand/uL Final    Red Blood Cell Count 01/06/2024 4.02  3.80 - 5.10 Million/uL Final    Hemoglobin 01/06/2024 14.0  11.7 - 15.5 g/dL Final    HCT 01/06/2024 39.7  35.0 - 45.0 % Final    MCV 01/06/2024 98.8  80.0 - 100.0 fL Final    MCH 01/06/2024 34.8 (H)  27.0 - 33.0 pg Final    MCHC 01/06/2024 35.3  32.0 - 36.0 g/dL Final    RDW 01/06/2024 11.3  11.0 - 15.0 % Final    Platelet Count 01/06/2024 266  140 - 400 Thousand/uL Final    SL AMB MPV 01/06/2024 9.7  7.5 - 12.5 fL Final    Neutrophils (Absolute) 01/06/2024 1,522  1,500 - 7,800 cells/uL Final    Lymphocytes (Absolute) 01/06/2024 1,039  850 - 3,900 cells/uL Final    Monocytes (Absolute) 01/06/2024 400  200 - 950 cells/uL Final    Eosinophils (Absolute) 01/06/2024 99  15 - 500 cells/uL Final    Basophils ABS 01/06/2024 40  0 - 200 cells/uL Final    Neutrophils 01/06/2024 49.1  % Final    Lymphocytes 01/06/2024 33.5  % Final    Monocytes 01/06/2024 12.9  % Final    Eosinophils 01/06/2024 3.2  % Final    Basophils PCT 01/06/2024 1.3  % Final    IgA 01/06/2024 289  70 - 320 mg/dL Final    IgG 01/06/2024 726  600 - 1,540 mg/dL Final    IGM 01/06/2024 33 (L)  50 - 300 mg/dL Final    Ig Kappa Free Light Chain 01/06/2024 14.8  3.3 - 19.4 mg/L Final    Ig Lambda Free Light Chain 01/06/2024 10.3  5.7 - 26.3 mg/L Final    Kappa/Lambda FluidC Ratio 01/06/2024 1.44  0.26 - 1.65 Final    Comment: Free kappa/lambda ratio in serum of normal individuals  is 0.26-1.65. Excess production of free kappa or lambda  chains can alter this ratio. Monoclonal free light  chains are found  in serum of patients with multiple  myeloma, Waldenstrom's macroglobulinemia, mu-heavy chain  disease, primary amyloidosis, light chain deposition  disease, monoclonal gammopathy of undetermined  significance, and lymphoproliferative disorders.  Measurement of free light chain concentration in serum  is useful for diagnosis, prognosis, monitoring disease  activity and following response to therapy of these  disorders.      FOLATE, SERUM 01/06/2024 12.6  ng/mL Final    Comment:                            Reference Range                             Low:           <3.4                             Borderline:    3.4-5.4                             Normal:        >5.4         Vitamin B-12 01/06/2024 802  200 - 1,100 pg/mL Final     Please note:  This report has been generated by a voice recognition software system. Therefore there may be syntax, spelling, and/or grammatical errors. Please call if you have any questions.

## 2024-01-29 ENCOUNTER — OFFICE VISIT (OUTPATIENT)
Dept: RHEUMATOLOGY | Facility: CLINIC | Age: 64
End: 2024-01-29
Payer: COMMERCIAL

## 2024-01-29 VITALS
BODY MASS INDEX: 23.96 KG/M2 | SYSTOLIC BLOOD PRESSURE: 132 MMHG | HEIGHT: 65 IN | WEIGHT: 143.8 LBS | DIASTOLIC BLOOD PRESSURE: 78 MMHG

## 2024-01-29 DIAGNOSIS — M15.0 PRIMARY GENERALIZED (OSTEO)ARTHRITIS: ICD-10-CM

## 2024-01-29 DIAGNOSIS — D80.1 HYPOGAMMAGLOBULINEMIA (HCC): ICD-10-CM

## 2024-01-29 DIAGNOSIS — D72.819 LEUKOPENIA, UNSPECIFIED TYPE: ICD-10-CM

## 2024-01-29 DIAGNOSIS — E55.9 VITAMIN D DEFICIENCY: ICD-10-CM

## 2024-01-29 DIAGNOSIS — M81.0 AGE-RELATED OSTEOPOROSIS WITHOUT CURRENT PATHOLOGICAL FRACTURE: ICD-10-CM

## 2024-01-29 DIAGNOSIS — M06.00 SERONEGATIVE RHEUMATOID ARTHRITIS (HCC): Primary | ICD-10-CM

## 2024-01-29 DIAGNOSIS — Z79.899 LONG-TERM USE OF PLAQUENIL: ICD-10-CM

## 2024-01-29 DIAGNOSIS — M06.09 RHEUMATOID ARTHRITIS OF MULTIPLE SITES WITH NEGATIVE RHEUMATOID FACTOR (HCC): ICD-10-CM

## 2024-01-29 PROCEDURE — 1160F RVW MEDS BY RX/DR IN RCRD: CPT | Performed by: PHYSICIAN ASSISTANT

## 2024-01-29 PROCEDURE — 1159F MED LIST DOCD IN RCRD: CPT | Performed by: PHYSICIAN ASSISTANT

## 2024-01-29 PROCEDURE — 99215 OFFICE O/P EST HI 40 MIN: CPT | Performed by: PHYSICIAN ASSISTANT

## 2024-01-29 RX ORDER — HYDROXYCHLOROQUINE SULFATE 200 MG/1
300 TABLET, FILM COATED ORAL
Qty: 135 TABLET | Refills: 3 | Status: SHIPPED | OUTPATIENT
Start: 2024-01-29 | End: 2024-04-28

## 2024-01-29 NOTE — PROGRESS NOTES
Assessment and Plan:   Ms. Gerber is a 64-year-old female who presents for rheumatology follow-up of seronegative RA, OA, and osteoporosis.    After last visit's discussion, she is agreeable to initiate hydroxychloroquine for her inflammatory arthritis, which is worsening and causing her increased pain and stiffness in multiple joints. We will initiate hydroxychloroquine 300mg QD based on the suggested 5mg/kg weight-based dosing.  We discussed that there is a rare side effect of retinopathy with the use of hydroxychloroquine, however the risk is greater with doses more than 6.5mg/kg/day and/or long term use of the medication (more than 5 years). Other potential side effects include: headaches or GI side effects. We discussed that the patient should follow with ophthalmology within the next 6 months for baseline eye exam, followed by annual follow-up while on this medication for Plaquenil toxicity monitoring.  Obtain CBC, CMP, sed rate, CRP prior to the next visit for toxicity monitoring and disease monitoring.  I also offered a prednisone taper today, but she is not interested at this time.  She will continue OTC NSAIDs as needed.    As previously discussed, she also has osteoporosis and is considering Reclast infusions.  We will discuss this further at the next follow-up visit as to not make too many changes at 1 time.  Continue calcium and vitamin D supplementation for now.    Follow-up in 3 months.    Plan:  Diagnoses and all orders for this visit:    Seronegative rheumatoid arthritis (HCC)  -     hydroxychloroquine (PLAQUENIL) 200 mg tablet; Take 1.5 tablets (300 mg total) by mouth daily with breakfast  -     CBC and differential  -     Comprehensive metabolic panel  -     C-reactive protein  -     Sedimentation rate, automated    Rheumatoid arthritis of multiple sites with negative rheumatoid factor (HCC)  -     hydroxychloroquine (PLAQUENIL) 200 mg tablet; Take 1.5 tablets (300 mg total) by mouth daily with  breakfast    Long-term use of Plaquenil  -     CBC and differential  -     Comprehensive metabolic panel  -     C-reactive protein  -     Sedimentation rate, automated    Age-related osteoporosis without current pathological fracture    Primary generalized (osteo)arthritis    Hypogammaglobulinemia (HCC)    Leukopenia, unspecified type    Vitamin D deficiency        I have personally reviewed prior notes, recent laboratory results, and pertinent films in PACS.     Activities as tolerated.   Exercise: try to maintain a low impact exercise regimen as much as possible. Walk for 30 minutes a day for at least 3 days a week.   Continue other medications as prescribed by PCP and other specialists.       RTC in 3 months    Rheumatic Disease Summary:  Seronegative RA  -Established care- February, 2016- long standing hx of polyarthritis using Advil.  -Serologies were negative however due to chronic RA deformities, patient was started on HCQ.  -Visit 8/23/23: re-evaluate OA vs RA.  Try meloxicam in place of ibuprofen  -[facility] ultrasound bilateral hands and wrists from 11/20/2023 revealed multifocal areas of synovial proliferation predominantly at the MCP joints bilaterally, multiple osteophytes at the MCP joints, trace joint effusion in the left fourth MCP joint, and small joint effusion with synovial proliferation and prominent marginal osteophytes at the left fifth PIP joint.  There is also mild synovial proliferation and mild hyperemia seen along the left wrist.  -Visit 12/4/2023: Re-consider HCQ (previously d/c'd due to non-compliance) after eval from heme/onc for chronic leukopenia and hypogammaglobulinemia.  Experienced extremity swelling with meloxicam, therefore cont ibuprofen  -Visit 1/29/2024: IA of the hands.  Start  QD. Cont OTC ibu PRN.   2.  Osteoporosis  -DEXA 10/9/2023 osteoporosis with LS T -1.5, left total hip T -2.8, and left FN T -2.3.  10-year risk of hip fracture is 5.4% and 10-year risk of major  osteoporotic fracture is 32%.     -Visit 12/4/2023: We discussed that she would benefit from osteoporosis treatment including IV Reclast once annual infusions, Prolia once every 6 months subcutaneous injections.  She will research these options and decide by the time of her next visit.  For now, continue calcium and vitamin D.  3. OA  -8/2023 x-rays: Right knee moderate OA, left knee mild OA, bilateral CMC OA, bilateral feet normal, right hip with mild OA  4.  Other comorbidities: Chronic leukopenia, hypogammaglobulinemia       HPI  Ms. Gerber is a 64-year-old female who presents for rheumatology follow-up of seronegative RA, OA, and osteoporosis.    She continues with pain, stiffness, and swelling of the bilateral hands, knees, and hips.  She has been taking over-the-counter ibuprofen.  She states that it is not working as well anymore.  She states that she previously tried topical Voltaren gel but this did not provide much benefit.    The following portions of the patient's history were reviewed and updated as appropriate: allergies, current medications, past family history, past medical history, past social history, past surgical history and problem list.      Review of Systems  Constitutional: Negative for weight change, fevers, chills, night sweats, fatigue.  ENT/Mouth: Negative for hearing changes, ear pain, nasal congestion, sinus pain, hoarseness, sore throat, rhinorrhea, swallowing difficulty.   Eyes: Negative for pain, redness, discharge, vision changes.   Cardiovascular: Negative for chest pain, SOB, palpitations.   Respiratory: Negative for cough, sputum, wheezing, dyspnea.   Gastrointestinal: Negative for nausea, vomiting, diarrhea, constipation, pain, heartburn.  Genitourinary: Negative for dysuria, urinary frequency, hematuria.   Musculoskeletal: As per HPI.  Skin: Negative for skin rash, color changes.   Neuro: Negative for weakness, numbness, tingling, loss of consciousness.   Psych: Negative for  anxiety, depression.   Heme/Lymph: Negative for easy bruising, bleeding, lymphadenopathy.        Past Medical History:   Diagnosis Date    Abnormal findings on diagnostic imaging of breast     Last assessed 13    Adjustment disorder with depressed mood     Last assessed 13    Anxiety     Arthritis     hands    Erythema migrans (Lyme disease)     Last assessed 13    GERD (gastroesophageal reflux disease)     Polyarthritis     Last assessed 16    Uterine leiomyoma     Vertigo        Past Surgical History:   Procedure Laterality Date    BREAST BIOPSY Left 2002    BREAST SURGERY      CARPAL TUNNEL RELEASE Bilateral 10/2008    DENTAL SURGERY      DILATION AND CURETTAGE OF UTERUS      HYSTEROSCOPY      w/D and C on 06 for irregular menses.  Her pathology demonstrated proliferative endometrium    INDUCED       MOHS RECONSTRUCTION Right 2019    Procedure: RECONSTRUCTION MOHS DEFECT RIGHT NOSE;  Surgeon: Kamlesh Damon MD;  Location: [facility]IN OR;  Service: Plastics    MOHS RECONSTRUCTION Left 2023    Procedure: RECONSTRUCTION MOHS DEFECT OF LEFT BROW/FOREHEAD WITH LOCAL FLAP;  Surgeon: Kamlesh Damon MD;  [facility];  Service: Plastics    [place] ADJT TIS REARGMT EYE/NOSE/EAR/LIP 10.1-30.0 SQCM Right 2019    Procedure: FLAP LOCAL RIGHT NOSE VS FTSG;  Surgeon: Kamlesh Damon MD;  Location: [facility] MAIN OR;  Service: Plastics    SKIN BIOPSY      TONSILLECTOMY      WRIST SURGERY Right        Social History     Socioeconomic History    Marital status: /Civil Union     Spouse name: Not on file    Number of children: Not on file    Years of education: Not on file    Highest education level: Not on file   Occupational History    Not on file   Tobacco Use    Smoking status: Never    Smokeless tobacco: Never   Vaping Use    Vaping status: Never Used   Substance and Sexual Activity    Alcohol use: Yes     Alcohol/week: 3.0 standard drinks of alcohol      Types: 3 Glasses of wine per week     Comment: social- 2-3 glasses of wine    Drug use: No    Sexual activity: Not Currently     Partners: Male     Birth control/protection: None   Other Topics Concern    Not on file   Social History Narrative    Caffeine Use 1 cup per day    Per allscripts: Denied: History of     Oriental orthodox     Social Determinants of Health     Financial Resource Strain: Not on file   Food Insecurity: Not on file   Transportation Needs: Not on file   Physical Activity: Not on file   Stress: Not on file   Social Connections: Not on file   Intimate Partner Violence: Not on file   Housing Stability: Not on file       Family History   Problem Relation Age of Onset    Arthritis Mother     Hypertension Mother     Coronary artery disease Mother     Skin cancer Mother     Varicose Veins Mother     Uterine cancer Mother     Arthritis Father     Hypertension Father     Stroke Father     Coronary artery disease Father     Other Father         Stent indications    Ulcerative colitis Sister     Rheum arthritis Maternal Grandmother     Cancer Maternal Uncle     Substance Abuse Neg Hx     Mental illness Neg Hx        Allergies   Allergen Reactions    Other Allergic Rhinitis     Seasonal  Mold         Current Outpatient Medications:     hydroxychloroquine (PLAQUENIL) 200 mg tablet, Take 1.5 tablets (300 mg total) by mouth daily with breakfast, Disp: 135 tablet, Rfl: 3    albuterol (Ventolin HFA) 90 mcg/act inhaler, Inhale 2 puffs every 6 (six) hours as needed for wheezing or shortness of breath, Disp: 18 g, Rfl: 1    Calcium Carbonate (CALCIUM 500 PO), Take by mouth Chewable, Disp: , Rfl:     Cholecalciferol (VITAMIN D3) 1000 units CHEW, Chew, Disp: , Rfl:     fluticasone (FLONASE) 50 mcg/act nasal spray, 2 sprays into each nostril daily, Disp: 48 g, Rfl: 2    hydrochlorothiazide (HYDRODIURIL) 25 mg tablet, Take 1 tablet (25 mg total) by mouth daily, Disp: 90 tablet, Rfl: 3    Ibuprofen 200 MG CAPS,  "Take 2 capsules by mouth 2 (two) times a day, Disp: , Rfl:     meclizine (ANTIVERT) 25 mg tablet, Take 1 tablet by mouth every 8 (eight) hours as needed for dizziness, Disp: 10 tablet, Rfl: 0    multivitamin (THERAGRAN) TABS, Take 1 tablet by mouth daily, Disp: , Rfl:     RABEprazole (ACIPHEX) 20 MG tablet, Take 1 tablet (20 mg total) by mouth daily before breakfast, Disp: 90 tablet, Rfl: 1    vitamin B-12 (VITAMIN B-12) 1,000 mcg tablet, Take by mouth daily, Disp: , Rfl:     VITAMIN D PO, Take by mouth Vitamin D 500, Disp: , Rfl:       Objective:    Vitals:    01/29/24 1543   BP: 132/78   Weight: 65.2 kg (143 lb 12.8 oz)   Height: 5' 5\" (1.651 m)       Physical Exam  General: Well appearing, well nourished, in no acute distress. Oriented x 3, normal mood and affect.  Ambulating without difficulty.  Skin: Good turgor, no rash, unusual bruising or prominent lesions.  Hair: Normal texture and distribution.  Nails: Normal color, no deformities.  HEENT:  Head: Normocephalic, atraumatic.  Eyes: Conjunctiva clear, sclera non-icteric, EOM intact.  Nose: No external lesions, mucosa non-inflamed.  Mouth: Mucous membranes moist, no mucosal lesions.  Neck: Supple   Musculoskeletal:   + Chronic synovial hypertrophy of multiple MCPs bilaterally  + Heberden's nodes and Celi's nodes bilateral hands  Neurologic: Alert and oriented. No focal neurological deficits appreciated.   Psychiatric: Normal mood and affect.       Espinoza Maloney PA-C  Rheumatology  "

## 2024-02-05 ENCOUNTER — OFFICE VISIT (OUTPATIENT)
Dept: FAMILY MEDICINE CLINIC | Facility: CLINIC | Age: 64
End: 2024-02-05
Payer: COMMERCIAL

## 2024-02-05 VITALS
SYSTOLIC BLOOD PRESSURE: 126 MMHG | WEIGHT: 142.2 LBS | DIASTOLIC BLOOD PRESSURE: 82 MMHG | TEMPERATURE: 97.6 F | RESPIRATION RATE: 16 BRPM | HEART RATE: 69 BPM | HEIGHT: 65 IN | BODY MASS INDEX: 23.69 KG/M2 | OXYGEN SATURATION: 98 %

## 2024-02-05 DIAGNOSIS — R09.81 SINUS CONGESTION: ICD-10-CM

## 2024-02-05 DIAGNOSIS — H65.92 LEFT OTITIS MEDIA WITH EFFUSION: Primary | ICD-10-CM

## 2024-02-05 PROCEDURE — 99213 OFFICE O/P EST LOW 20 MIN: CPT | Performed by: FAMILY MEDICINE

## 2024-02-05 RX ORDER — AZITHROMYCIN 250 MG/1
TABLET, FILM COATED ORAL DAILY
Qty: 6 TABLET | Refills: 0 | Status: SHIPPED | OUTPATIENT
Start: 2024-02-05 | End: 2024-02-10

## 2024-02-05 RX ORDER — PREDNISONE 10 MG/1
TABLET ORAL
Qty: 20 TABLET | Refills: 0 | Status: SHIPPED | OUTPATIENT
Start: 2024-02-05

## 2024-02-05 NOTE — PROGRESS NOTES
Assessment/Plan:    Left otitis media with effusion  - advised to continue Flonase daily and start prednisone taper and Z-mario, encouraged to follow up for persistent or worsening symptoms  - azithromycin (Zithromax) 250 mg tablet; Take 2 tablets (500 mg total) by mouth daily for 1 day, THEN 1 tablet (250 mg total) daily for 4 days.  Dispense: 6 tablet; Refill: 0  - predniSONE 10 mg tablet; Take 4 tablets daily with food for 2 days, 3 tablets daily for 2 days, 2 tablets daily for 2 days, 1 tablet daily for 2 days  Dispense: 20 tablet; Refill: 0     Sinus congestion  - predniSONE 10 mg tablet; Take 4 tablets daily with food for 2 days, 3 tablets daily for 2 days, 2 tablets daily for 2 days, 1 tablet daily for 2 days  Dispense: 20 tablet; Refill: 0          Return as scheduled or sooner as needed. The treatment plan and possible side effects of new medications were reviewed with the patient today. The patient understands and agrees with the treatment plan.      Subjective:   Chief Complaint   Patient presents with    Earache     Started a week ago    Nasal Congestion     Comes and goes       Patient ID: Pat Gerber is a 64 y.o. female who presents today with c/o nasal congestion, postnasal drip, ear fullness, headache and sinus pressure onset over a week ago, she is now having left earache and decreased hearing, no fever, no ear drainage, no cough, no chest pain or shortness of breath. Patient has been using Flonase without significant relief.       The following portions of the patient's history were reviewed and updated as appropriate: allergies, current medications, past family history, past medical history, past social history, past surgical history and problem list.    Past Medical History:   Diagnosis Date    Abnormal findings on diagnostic imaging of breast     Last assessed 08/16/13    Adjustment disorder with depressed mood     Last assessed 05/17/13    Anxiety     Arthritis     hands    Erythema  migrans (Lyme disease)     Last assessed 13    GERD (gastroesophageal reflux disease)     Polyarthritis     Last assessed 16    Uterine leiomyoma     Vertigo      Past Surgical History:   Procedure Laterality Date    BREAST BIOPSY Left 2002    BREAST SURGERY      CARPAL TUNNEL RELEASE Bilateral 10/2008    DENTAL SURGERY      DILATION AND CURETTAGE OF UTERUS      HYSTEROSCOPY      w/D and C on 06 for irregular menses.  Her pathology demonstrated proliferative endometrium    INDUCED       MOHS RECONSTRUCTION Right 2019    Procedure: RECONSTRUCTION MOHS DEFECT RIGHT NOSE;  Surgeon: Kamlesh Damon MD;  Location: [facility];  Service: Plastics    MOHS RECONSTRUCTION Left 2023    Procedure: RECONSTRUCTION MOHS DEFECT OF LEFT BROW/FOREHEAD WITH LOCAL FLAP;  Surgeon: Kamlesh Damon MD;  [facility];  Service: Plastics    FL ADJT TIS REARGMT EYE/NOSE/EAR/LIP 10.1-30.0 SQCM Right 2019    Procedure: FLAP LOCAL RIGHT NOSE VS FTSG;  Surgeon: Kamlesh Damon MD;  Location: [facility];  Service: Plastics    SKIN BIOPSY      TONSILLECTOMY      WRIST SURGERY Right      Family History   Problem Relation Age of Onset    Arthritis Mother     Hypertension Mother     Coronary artery disease Mother     Skin cancer Mother     Varicose Veins Mother     Uterine cancer Mother     Arthritis Father     Hypertension Father     Stroke Father     Coronary artery disease Father     Other Father         Stent indications    Ulcerative colitis Sister     Rheum arthritis Maternal Grandmother     Cancer Maternal Uncle     Substance Abuse Neg Hx     Mental illness Neg Hx      Social History     Socioeconomic History    Marital status: /Civil Union     Spouse name: Not on file    Number of children: Not on file    Years of education: Not on file    Highest education level: Not on file   Occupational History    Not on file   Tobacco Use    Smoking status: Never    Smokeless  tobacco: Never   Vaping Use    Vaping status: Never Used   Substance and Sexual Activity    Alcohol use: Yes     Alcohol/week: 3.0 standard drinks of alcohol     Types: 3 Glasses of wine per week     Comment: social- 2-3 glasses of wine    Drug use: No    Sexual activity: Not Currently     Partners: Male     Birth control/protection: None   Other Topics Concern    Not on file   Social History Narrative    Caffeine Use 1 cup per day    Per allscripts: Denied: History of     [place]     Social Determinants of Health     Financial Resource Strain: Not on file   Food Insecurity: Not on file   Transportation Needs: Not on file   Physical Activity: Not on file   Stress: Not on file   Social Connections: Not on file   Intimate Partner Violence: Not on file   Housing Stability: Not on file       Current Outpatient Medications:     albuterol (Ventolin HFA) 90 mcg/act inhaler, Inhale 2 puffs every 6 (six) hours as needed for wheezing or shortness of breath, Disp: 18 g, Rfl: 1    Calcium Carbonate (CALCIUM 500 PO), Take by mouth Chewable, Disp: , Rfl:     Cholecalciferol (VITAMIN D3) 1000 units CHEW, Chew, Disp: , Rfl:     fluticasone (FLONASE) 50 mcg/act nasal spray, 2 sprays into each nostril daily, Disp: 48 g, Rfl: 2    hydroxychloroquine (PLAQUENIL) 200 mg tablet, Take 1.5 tablets (300 mg total) by mouth daily with breakfast, Disp: 135 tablet, Rfl: 3    Ibuprofen 200 MG CAPS, Take 2 capsules by mouth 2 (two) times a day, Disp: , Rfl:     meclizine (ANTIVERT) 25 mg tablet, Take 1 tablet by mouth every 8 (eight) hours as needed for dizziness, Disp: 10 tablet, Rfl: 0    multivitamin (THERAGRAN) TABS, Take 1 tablet by mouth daily, Disp: , Rfl:     RABEprazole (ACIPHEX) 20 MG tablet, Take 1 tablet (20 mg total) by mouth daily before breakfast, Disp: 90 tablet, Rfl: 1    vitamin B-12 (VITAMIN B-12) 1,000 mcg tablet, Take by mouth daily, Disp: , Rfl:     VITAMIN D PO, Take by mouth Vitamin D 500, Disp: , Rfl:      "hydrochlorothiazide (HYDRODIURIL) 25 mg tablet, Take 1 tablet (25 mg total) by mouth daily (Patient not taking: Reported on 2/5/2024), Disp: 90 tablet, Rfl: 3    Review of Systems   Constitutional:  Positive for fatigue. Negative for chills and fever.   HENT:  Positive for congestion, ear pain, postnasal drip, sinus pressure, sinus pain and sore throat. Negative for ear discharge.    Respiratory:  Negative for cough, shortness of breath and wheezing.    Cardiovascular:  Negative for chest pain and palpitations.   Gastrointestinal:  Negative for abdominal pain, diarrhea, nausea and vomiting.   Neurological:  Positive for headaches. Negative for dizziness.   Hematological:  Negative for adenopathy.           Objective:    Vitals:    02/05/24 1657   BP: 126/82   Pulse: 69   Resp: 16   Temp: 97.6 °F (36.4 °C)   SpO2: 98%   Weight: 64.5 kg (142 lb 3.2 oz)   Height: 5' 5\" (1.651 m)        Physical Exam  Constitutional:       General: She is not in acute distress.     Appearance: She is well-developed.   HENT:      Head: Atraumatic.      Right Ear: Tympanic membrane and ear canal normal.      Left Ear: Ear canal normal. A middle ear effusion is present. Tympanic membrane is injected.      Nose: Mucosal edema present.      Mouth/Throat:      Pharynx: No oropharyngeal exudate or posterior oropharyngeal erythema.   Cardiovascular:      Rate and Rhythm: Normal rate and regular rhythm.      Heart sounds: Normal heart sounds. No murmur heard.  Pulmonary:      Effort: Pulmonary effort is normal. No respiratory distress.      Breath sounds: Normal breath sounds. No wheezing, rhonchi or rales.   Musculoskeletal:      Cervical back: Neck supple.   Lymphadenopathy:      Cervical: No cervical adenopathy.   Neurological:      Mental Status: She is alert and oriented to person, place, and time.          "

## 2024-04-28 LAB
ALBUMIN SERPL-MCNC: 4.6 G/DL (ref 3.6–5.1)
ALBUMIN/GLOB SERPL: 2 (CALC) (ref 1–2.5)
ALP SERPL-CCNC: 77 U/L (ref 37–153)
ALT SERPL-CCNC: 14 U/L (ref 6–29)
AST SERPL-CCNC: 22 U/L (ref 10–35)
BASOPHILS # BLD AUTO: 42 CELLS/UL (ref 0–200)
BASOPHILS NFR BLD AUTO: 1.2 %
BILIRUB SERPL-MCNC: 0.8 MG/DL (ref 0.2–1.2)
BUN SERPL-MCNC: 10 MG/DL (ref 7–25)
BUN/CREAT SERPL: NORMAL (CALC) (ref 6–22)
CALCIUM SERPL-MCNC: 9.8 MG/DL (ref 8.6–10.4)
CHLORIDE SERPL-SCNC: 99 MMOL/L (ref 98–110)
CO2 SERPL-SCNC: 29 MMOL/L (ref 20–32)
CREAT SERPL-MCNC: 0.77 MG/DL (ref 0.5–1.05)
CRP SERPL-MCNC: <3 MG/L
EOSINOPHIL # BLD AUTO: 102 CELLS/UL (ref 15–500)
EOSINOPHIL NFR BLD AUTO: 2.9 %
ERYTHROCYTE [DISTWIDTH] IN BLOOD BY AUTOMATED COUNT: 11.3 % (ref 11–15)
ERYTHROCYTE [SEDIMENTATION RATE] IN BLOOD BY WESTERGREN METHOD: 2 MM/H
GFR/BSA.PRED SERPLBLD CYS-BASED-ARV: 85 ML/MIN/1.73M2
GLOBULIN SER CALC-MCNC: 2.3 G/DL (CALC) (ref 1.9–3.7)
GLUCOSE SERPL-MCNC: 94 MG/DL (ref 65–99)
HCT VFR BLD AUTO: 42.1 % (ref 35–45)
HGB BLD-MCNC: 14.2 G/DL (ref 11.7–15.5)
LYMPHOCYTES # BLD AUTO: 1197 CELLS/UL (ref 850–3900)
LYMPHOCYTES NFR BLD AUTO: 34.2 %
MCH RBC QN AUTO: 33.9 PG (ref 27–33)
MCHC RBC AUTO-ENTMCNC: 33.7 G/DL (ref 32–36)
MCV RBC AUTO: 100.5 FL (ref 80–100)
MONOCYTES # BLD AUTO: 354 CELLS/UL (ref 200–950)
MONOCYTES NFR BLD AUTO: 10.1 %
NEUTROPHILS # BLD AUTO: 1806 CELLS/UL (ref 1500–7800)
NEUTROPHILS NFR BLD AUTO: 51.6 %
PLATELET # BLD AUTO: 225 THOUSAND/UL (ref 140–400)
PMV BLD REES-ECKER: 9.2 FL (ref 7.5–12.5)
POTASSIUM SERPL-SCNC: 4.3 MMOL/L (ref 3.5–5.3)
PROT SERPL-MCNC: 6.9 G/DL (ref 6.1–8.1)
RBC # BLD AUTO: 4.19 MILLION/UL (ref 3.8–5.1)
SODIUM SERPL-SCNC: 139 MMOL/L (ref 135–146)
WBC # BLD AUTO: 3.5 THOUSAND/UL (ref 3.8–10.8)

## 2024-04-29 ENCOUNTER — OFFICE VISIT (OUTPATIENT)
Dept: RHEUMATOLOGY | Facility: CLINIC | Age: 64
End: 2024-04-29
Payer: COMMERCIAL

## 2024-04-29 VITALS
SYSTOLIC BLOOD PRESSURE: 118 MMHG | OXYGEN SATURATION: 98 % | WEIGHT: 140.4 LBS | TEMPERATURE: 98.6 F | HEART RATE: 70 BPM | DIASTOLIC BLOOD PRESSURE: 78 MMHG | HEIGHT: 65 IN | BODY MASS INDEX: 23.39 KG/M2

## 2024-04-29 DIAGNOSIS — D72.819 LEUKOPENIA, UNSPECIFIED TYPE: ICD-10-CM

## 2024-04-29 DIAGNOSIS — M81.0 AGE-RELATED OSTEOPOROSIS WITHOUT CURRENT PATHOLOGICAL FRACTURE: ICD-10-CM

## 2024-04-29 DIAGNOSIS — M06.00 SERONEGATIVE RHEUMATOID ARTHRITIS (HCC): Primary | ICD-10-CM

## 2024-04-29 DIAGNOSIS — M15.0 PRIMARY GENERALIZED (OSTEO)ARTHRITIS: ICD-10-CM

## 2024-04-29 DIAGNOSIS — Z79.899 LONG-TERM USE OF PLAQUENIL: ICD-10-CM

## 2024-04-29 PROCEDURE — 99214 OFFICE O/P EST MOD 30 MIN: CPT | Performed by: PHYSICIAN ASSISTANT

## 2024-04-29 PROCEDURE — 1159F MED LIST DOCD IN RCRD: CPT | Performed by: PHYSICIAN ASSISTANT

## 2024-04-29 PROCEDURE — 1160F RVW MEDS BY RX/DR IN RCRD: CPT | Performed by: PHYSICIAN ASSISTANT

## 2024-04-29 RX ORDER — CHLOROQUINE PHOSPHATE 250 MG/1
125 TABLET ORAL DAILY
Qty: 15 TABLET | Refills: 5 | Status: SHIPPED | OUTPATIENT
Start: 2024-04-29 | End: 2024-05-29

## 2024-04-29 NOTE — PROGRESS NOTES
Assessment and Plan:   Ms. Gerber is a 64-year-old female who presents for rheumatology follow-up of seronegative RA, OA, and osteoporosis.     At the time of the last visit approximately 3 months ago, she was started on hydroxychloroquine for worsening inflammatory arthritis.  Unfortunately she developed side effects and discontinued the medication.  We discussed today that she may trial chloroquine 125 mg once daily in place of hydroxychloroquine, as this may be better tolerated.    In terms of the osteoporosis, we discussed that side effects of bisphosphonates such as IV Reclast include but are not limited to esophageal and gastric side effects, osteonecrosis of the jaw, and atypical femoral fractures.  IV bisphosphonates can cause acute phase reaction (flu-like symptoms). The patient does not have any upcoming dental work planned. She will do some research in regards to Reclast infusions to decide if she is interested in starting these.  For now, continue calcium and vitamin D supplementation.    Follow-up in 3 months    Plan:  Diagnoses and all orders for this visit:    Seronegative rheumatoid arthritis (HCC)  -     chloroquine (ARALEN) 250 MG tablet; Take 0.5 tablets (125 mg total) by mouth daily    Long-term use of Plaquenil  -     chloroquine (ARALEN) 250 MG tablet; Take 0.5 tablets (125 mg total) by mouth daily    Age-related osteoporosis without current pathological fracture    Primary generalized (osteo)arthritis    Leukopenia, unspecified type        I have personally reviewed prior notes, recent laboratory results, and pertinent films in PACS.     Activities as tolerated.   Exercise: try to maintain a low impact exercise regimen as much as possible. Walk for 30 minutes a day for at least 3 days a week.   Continue other medications as prescribed by PCP and other specialists.       RTC in 3 months    Rheumatic Disease Summary:  Seronegative RA  -Established care- February, 2016- long standing hx of  polyarthritis using Advil.  -Serologies were negative however due to chronic RA deformities, patient was started on HCQ.  -Visit 8/23/23: re-evaluate OA vs RA.  Try meloxicam in place of ibuprofen  -[facility] ultrasound bilateral hands and wrists from 11/20/2023 revealed multifocal areas of synovial proliferation predominantly at the MCP joints bilaterally, multiple osteophytes at the MCP joints, trace joint effusion in the left fourth MCP joint, and small joint effusion with synovial proliferation and prominent marginal osteophytes at the left fifth PIP joint.  There is also mild synovial proliferation and mild hyperemia seen along the left wrist.  -Visit 12/4/2023: Re-consider HCQ (previously d/c'd due to non-compliance) after eval from heme/onc for chronic leukopenia and hypogammaglobulinemia.  Experienced extremity swelling with meloxicam, therefore cont ibuprofen  -Visit 1/29/2024: IA of the hands.  Start  QD. Cont OTC ibu PRN.   -Visit 4/29/2024: side effects with HCQ, D/C'd.  Trial chloroquine 125 daily  2.  Osteoporosis  -DEXA 10/9/2023 osteoporosis with LS T -1.5, left total hip T -2.8, and left FN T -2.3.  10-year risk of hip fracture is 5.4% and 10-year risk of major osteoporotic fracture is 32%.     -Visit 12/4/2023: We discussed that she would benefit from osteoporosis treatment including IV Reclast once annual infusions, Prolia once every 6 months subcutaneous injections.  She will research these options and decide by the time of her next visit.  For now, continue calcium and vitamin D.  -Visit 4/29/2024: Patient will plan to research Reclast and decide if she would like to pursue this.  3. OA  -8/2023 x-rays: Right knee moderate OA, left knee mild OA, bilateral CMC OA, bilateral feet normal, right hip with mild OA  4.  Other comorbidities: Chronic leukopenia, hypogammaglobulinemia       HPI  Ms. Gerber is a 64-year-old female who presents for rheumatology follow-up of seronegative RA, OA, and  osteoporosis.     Still experiencing pain and stiffness of multiple joints.  States that she experienced side effects with the hydroxychloroquine and therefore discontinued it.    The following portions of the patient's history were reviewed and updated as appropriate: allergies, current medications, past family history, past medical history, past social history, past surgical history and problem list.      Review of Systems  Constitutional: Negative for weight change, fevers, chills, night sweats, fatigue.  ENT/Mouth: Negative for hearing changes, ear pain, nasal congestion, sinus pain, hoarseness, sore throat, rhinorrhea, swallowing difficulty.   Eyes: Negative for pain, redness, discharge, vision changes.   Cardiovascular: Negative for chest pain, SOB, palpitations.   Respiratory: Negative for cough, sputum, wheezing, dyspnea.   Gastrointestinal: Negative for nausea, vomiting, diarrhea, constipation, pain, heartburn.  Genitourinary: Negative for dysuria, urinary frequency, hematuria.   Musculoskeletal: As per HPI.  Skin: Negative for skin rash, color changes.   Neuro: Negative for weakness, numbness, tingling, loss of consciousness.   Psych: Negative for anxiety, depression.   Heme/Lymph: Negative for easy bruising, bleeding, lymphadenopathy.        Past Medical History:   Diagnosis Date    Abnormal findings on diagnostic imaging of breast     Last assessed 08/16/13    Adjustment disorder with depressed mood     Last assessed 05/17/13    Anxiety     Arthritis     hands    Erythema migrans (Lyme disease)     Last assessed 06/29/13    GERD (gastroesophageal reflux disease)     Polyarthritis     Last assessed 02/17/16    Uterine leiomyoma     Vertigo        Past Surgical History:   Procedure Laterality Date    BREAST BIOPSY Left 02/2002    BREAST SURGERY      CARPAL TUNNEL RELEASE Bilateral 10/2008    DENTAL SURGERY      DILATION AND CURETTAGE OF UTERUS      HYSTEROSCOPY      w/D and C on 11/20/06 for irregular  menses.  Her pathology demonstrated proliferative endometrium    INDUCED       MOHS RECONSTRUCTION Right 2019    Procedure: RECONSTRUCTION MOHS DEFECT RIGHT NOSE;  Surgeon: Kamlesh Damon MD;  Location: [facility];  Service: Plastics    MOHS RECONSTRUCTION Left 2023    Procedure: RECONSTRUCTION MOHS DEFECT OF LEFT BROW/FOREHEAD WITH LOCAL FLAP;  Surgeon: Kamlesh Damon MD;  Location[facility];  Service: Plastics    NH ADJT TIS REARGMT EYE/NOSE/EAR/LIP 10.1-30.0 SQCM Right 2019    Procedure: FLAP LOCAL RIGHT NOSE VS FTSG;  Surgeon: Kamlesh Damon MD;  Location: [facility];  Service: Plastics    SKIN BIOPSY      TONSILLECTOMY      WRIST SURGERY Right        Social History     Socioeconomic History    Marital status: /Civil Union     Spouse name: Not on file    Number of children: Not on file    Years of education: Not on file    Highest education level: Not on file   Occupational History    Not on file   Tobacco Use    Smoking status: Never    Smokeless tobacco: Never   Vaping Use    Vaping status: Never Used   Substance and Sexual Activity    Alcohol use: Yes     Alcohol/week: 3.0 standard drinks of alcohol     Types: 3 Glasses of wine per week     Comment: social- 2-3 glasses of wine    Drug use: No    Sexual activity: Not Currently     Partners: Male     Birth control/protection: None   Other Topics Concern    Not on file   Social History Narrative    Caffeine Use 1 cup per day    Per allscripts: Denied: History of     [employer]     Social Determinants of Health     Financial Resource Strain: Not on file   Food Insecurity: Not on file   Transportation Needs: Not on file   Physical Activity: Not on file   Stress: Not on file   Social Connections: Not on file   Intimate Partner Violence: Not on file   Housing Stability: Not on file       Family History   Problem Relation Age of Onset    Arthritis Mother     Hypertension Mother     Coronary artery  "disease Mother     Skin cancer Mother     Varicose Veins Mother     Uterine cancer Mother     Arthritis Father     Hypertension Father     Stroke Father     Coronary artery disease Father     Other Father         Stent indications    Ulcerative colitis Sister     Rheum arthritis Maternal Grandmother     Cancer Maternal Uncle     Substance Abuse Neg Hx     Mental illness Neg Hx        Allergies   Allergen Reactions    Other Allergic Rhinitis     Seasonal  Mold         Current Outpatient Medications:     albuterol (Ventolin HFA) 90 mcg/act inhaler, Inhale 2 puffs every 6 (six) hours as needed for wheezing or shortness of breath, Disp: 18 g, Rfl: 1    Calcium Carbonate (CALCIUM 500 PO), Take by mouth Chewable, Disp: , Rfl:     chloroquine (ARALEN) 250 MG tablet, Take 0.5 tablets (125 mg total) by mouth daily, Disp: 15 tablet, Rfl: 5    Cholecalciferol (VITAMIN D3) 1000 units CHEW, Chew, Disp: , Rfl:     fluticasone (FLONASE) 50 mcg/act nasal spray, 2 sprays into each nostril daily, Disp: 48 g, Rfl: 2    meclizine (ANTIVERT) 25 mg tablet, Take 1 tablet by mouth every 8 (eight) hours as needed for dizziness, Disp: 10 tablet, Rfl: 0    multivitamin (THERAGRAN) TABS, Take 1 tablet by mouth daily, Disp: , Rfl:     RABEprazole (ACIPHEX) 20 MG tablet, Take 1 tablet (20 mg total) by mouth daily before breakfast, Disp: 90 tablet, Rfl: 1    vitamin B-12 (VITAMIN B-12) 1,000 mcg tablet, Take by mouth daily, Disp: , Rfl:     VITAMIN D PO, Take by mouth Vitamin D 500, Disp: , Rfl:     hydrochlorothiazide (HYDRODIURIL) 25 mg tablet, Take 1 tablet (25 mg total) by mouth daily (Patient not taking: Reported on 2/5/2024), Disp: 90 tablet, Rfl: 3    Ibuprofen 200 MG CAPS, Take 2 capsules by mouth 2 (two) times a day, Disp: , Rfl:       Objective:    Vitals:    04/29/24 1554   BP: 118/78   Pulse: 70   Temp: 98.6 °F (37 °C)   SpO2: 98%   Weight: 63.7 kg (140 lb 6.4 oz)   Height: 5' 5\" (1.651 m)       Physical Exam  General: Well appearing, " well nourished, in no acute distress. Oriented x 3, normal mood and affect.  Ambulating without difficulty.  Skin: Good turgor, no rash, unusual bruising or prominent lesions.  Hair: Normal texture and distribution.  Nails: Normal color, no deformities.  HEENT:  Head: Normocephalic, atraumatic.  Eyes: Conjunctiva clear, sclera non-icteric, EOM intact.  Nose: No external lesions, mucosa non-inflamed.  Mouth: Mucous membranes moist, no mucosal lesions.  Neck: Supple  Musculoskeletal:   + Chronic synovial hypertrophy of multiple MCPs bilaterally  + Heberden's nodes and Celi's nodes bilateral hands  Neurologic: Alert and oriented. No focal neurological deficits appreciated.   Psychiatric: Normal mood and affect.       Espinoza Maloney PA-C  Rheumatology

## 2024-05-28 ENCOUNTER — OFFICE VISIT (OUTPATIENT)
Dept: FAMILY MEDICINE CLINIC | Facility: CLINIC | Age: 64
End: 2024-05-28
Payer: COMMERCIAL

## 2024-05-28 ENCOUNTER — TELEPHONE (OUTPATIENT)
Dept: FAMILY MEDICINE CLINIC | Facility: CLINIC | Age: 64
End: 2024-05-28

## 2024-05-28 ENCOUNTER — APPOINTMENT (OUTPATIENT)
Dept: RADIOLOGY | Facility: CLINIC | Age: 64
End: 2024-05-28
Payer: COMMERCIAL

## 2024-05-28 VITALS
BODY MASS INDEX: 23.99 KG/M2 | RESPIRATION RATE: 16 BRPM | DIASTOLIC BLOOD PRESSURE: 76 MMHG | WEIGHT: 144 LBS | TEMPERATURE: 97.7 F | SYSTOLIC BLOOD PRESSURE: 120 MMHG | HEIGHT: 65 IN | OXYGEN SATURATION: 99 % | HEART RATE: 80 BPM

## 2024-05-28 DIAGNOSIS — M25.552 LEFT HIP PAIN: ICD-10-CM

## 2024-05-28 DIAGNOSIS — M54.50 ACUTE MIDLINE LOW BACK PAIN WITHOUT SCIATICA: ICD-10-CM
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
## 2024-05-28 DIAGNOSIS — M79.605 LEFT LEG PAIN: ICD-10-CM

## 2024-05-28 PROCEDURE — 99213 OFFICE O/P EST LOW 20 MIN: CPT | Performed by: STUDENT IN AN ORGANIZED HEALTH CARE EDUCATION/TRAINING PROGRAM

## 2024-05-28 PROCEDURE — 73590 X-RAY EXAM OF LOWER LEG: CPT

## 2024-05-28 PROCEDURE — 73503 X-RAY EXAM HIP UNI 4/> VIEWS: CPT

## 2024-05-28 PROCEDURE — 73551 X-RAY EXAM OF FEMUR 1: CPT

## 2024-05-28 PROCEDURE — 72100 X-RAY EXAM L-S SPINE 2/3 VWS: CPT

## 2024-05-28 NOTE — TELEPHONE ENCOUNTER
MD Judi Herrera  Please inform patient her xray results are normal no signs of fracture      LMOM for pt regarding results.

## 2024-05-29 NOTE — PROGRESS NOTES
Ambulatory Visit  Name: Pat Gerber      : 1960      MRN: [id number]  Encounter Provider: Abby Gandhi MD  Encounter Date: 2024   Encounter department: [facility]    Assessment & Plan   1. Fall, initial encounter  -     XR spine lumbar 2 or 3 views injury; Future; Expected date: 2024  -     XR hip/pelv 4+ vw left if performed; Future; Expected date: 2024  -     XR tibia fibula 2 vw left; Future; Expected date: 2024  -     XR femur 1 vw left; Future; Expected date: 2024  2. Acute midline low back pain without sciatica  -     XR spine lumbar 2 or 3 views injury; Future; Expected date: 2024  3. Left hip pain  -     XR hip/pelv 4+ vw left if performed; Future; Expected date: 2024  4. Left leg pain  -     XR tibia fibula 2 vw left; Future; Expected date: 2024  -     XR femur 1 vw left; Future; Expected date: 2024  Vitals reviewed with patient    Left leg hip and low back pain secondary to recent fall:  Will order for x-ray imaging to evaluate lumbar spine hip femur and tib-fib  Discussed with patient to continue NSAIDs for pain and inflammation on a full stomach    Will follow-up on results and inform patient accordingly    Follow-up as needed if symptoms worsen or do not improve     History of Present Illness     Pat is a 64-year-old female who presents to the office today for an acute visit.  Patient reports she was gardening and fell backwards yesterday.  Denies any head trauma.  Patient reports she landed on her left leg which patient reports pain 8 out of 10.  Patient reports area where she fell has a bruise on it on back of her left thigh.  Has been using compression stockings for support and is able to put weight on her leg.  Patient also reports low back pain from the fall.  She has been taking ibuprofen every 4 hours which has been helping with the pain.  Denies any other acute  symptoms.      Review of Systems   Constitutional:  Negative for appetite change.   Respiratory:  Negative for shortness of breath.    Cardiovascular:  Negative for chest pain.   Gastrointestinal:  Negative for abdominal pain.   Musculoskeletal:  Positive for back pain. Negative for neck pain.        Left leg and back pain   Skin:  Positive for color change.   Neurological:  Negative for dizziness, light-headedness and headaches.     Past Medical History   Past Medical History:   Diagnosis Date    Abnormal findings on diagnostic imaging of breast     Last assessed 13    Adjustment disorder with depressed mood     Last assessed 13    Anxiety     Arthritis     hands    Erythema migrans (Lyme disease)     Last assessed 13    GERD (gastroesophageal reflux disease)     Polyarthritis     Last assessed 16    Uterine leiomyoma     Vertigo      Past Surgical History:   Procedure Laterality Date    BREAST BIOPSY Left 2002    BREAST SURGERY      CARPAL TUNNEL RELEASE Bilateral 10/2008    DENTAL SURGERY      DILATION AND CURETTAGE OF UTERUS      HYSTEROSCOPY      w/D and C on 06 for irregular menses.  Her pathology demonstrated proliferative endometrium    INDUCED       MOHS RECONSTRUCTION Right 2019    Procedure: RECONSTRUCTION MOHS DEFECT RIGHT NOSE;  Surgeon: Kamlesh Damon MD;  Location: [facility];  Service: Plastics    MOHS RECONSTRUCTION Left 2023    Procedure: RECONSTRUCTION MOHS DEFECT OF LEFT BROW/FOREHEAD WITH LOCAL FLAP;  Surgeon: Kamlesh Damon MD;  [facility];  Service: Plastics    [place] ADJT TIS REARGMT EYE/NOSE/EAR/LIP 10.1-30.0 SQCM Right 2019    Procedure: FLAP LOCAL RIGHT NOSE VS FTSG;  Surgeon: Kamlesh Damon MD;  Location: [facility];  Service: Plastics    SKIN BIOPSY      TONSILLECTOMY      WRIST SURGERY Right      Family History   Problem Relation Age of Onset    Arthritis Mother     Hypertension Mother     Coronary  artery disease Mother     Skin cancer Mother     Varicose Veins Mother     Uterine cancer Mother     Arthritis Father     Hypertension Father     Stroke Father     Coronary artery disease Father     Other Father         Stent indications    Ulcerative colitis Sister     Rheum arthritis Maternal Grandmother     Cancer Maternal Uncle     Substance Abuse Neg Hx     Mental illness Neg Hx      Current Outpatient Medications on File Prior to Visit   Medication Sig Dispense Refill    albuterol (Ventolin HFA) 90 mcg/act inhaler Inhale 2 puffs every 6 (six) hours as needed for wheezing or shortness of breath 18 g 1    Calcium Carbonate (CALCIUM 500 PO) Take by mouth Chewable      chloroquine (ARALEN) 250 MG tablet Take 0.5 tablets (125 mg total) by mouth daily 15 tablet 5    Cholecalciferol (VITAMIN D3) 1000 units CHEW Chew      fluticasone (FLONASE) 50 mcg/act nasal spray 2 sprays into each nostril daily 48 g 2    hydrochlorothiazide (HYDRODIURIL) 25 mg tablet Take 1 tablet (25 mg total) by mouth daily 90 tablet 3    Ibuprofen 200 MG CAPS Take 2 capsules by mouth 2 (two) times a day      meclizine (ANTIVERT) 25 mg tablet Take 1 tablet by mouth every 8 (eight) hours as needed for dizziness 10 tablet 0    multivitamin (THERAGRAN) TABS Take 1 tablet by mouth daily      RABEprazole (ACIPHEX) 20 MG tablet Take 1 tablet (20 mg total) by mouth daily before breakfast 90 tablet 1    vitamin B-12 (VITAMIN B-12) 1,000 mcg tablet Take by mouth daily      VITAMIN D PO Take by mouth Vitamin D 500       No current facility-administered medications on file prior to visit.     Allergies   Allergen Reactions    Other Allergic Rhinitis     Seasonal  Mold      Current Outpatient Medications on File Prior to Visit   Medication Sig Dispense Refill    albuterol (Ventolin HFA) 90 mcg/act inhaler Inhale 2 puffs every 6 (six) hours as needed for wheezing or shortness of breath 18 g 1    Calcium Carbonate (CALCIUM 500 PO) Take by mouth Chewable       "chloroquine (ARALEN) 250 MG tablet Take 0.5 tablets (125 mg total) by mouth daily 15 tablet 5    Cholecalciferol (VITAMIN D3) 1000 units CHEW Chew      fluticasone (FLONASE) 50 mcg/act nasal spray 2 sprays into each nostril daily 48 g 2    hydrochlorothiazide (HYDRODIURIL) 25 mg tablet Take 1 tablet (25 mg total) by mouth daily 90 tablet 3    Ibuprofen 200 MG CAPS Take 2 capsules by mouth 2 (two) times a day      meclizine (ANTIVERT) 25 mg tablet Take 1 tablet by mouth every 8 (eight) hours as needed for dizziness 10 tablet 0    multivitamin (THERAGRAN) TABS Take 1 tablet by mouth daily      RABEprazole (ACIPHEX) 20 MG tablet Take 1 tablet (20 mg total) by mouth daily before breakfast 90 tablet 1    vitamin B-12 (VITAMIN B-12) 1,000 mcg tablet Take by mouth daily      VITAMIN D PO Take by mouth Vitamin D 500       No current facility-administered medications on file prior to visit.      Social History     Tobacco Use    Smoking status: Never    Smokeless tobacco: Never   Vaping Use    Vaping status: Never Used   Substance and Sexual Activity    Alcohol use: Yes     Alcohol/week: 3.0 standard drinks of alcohol     Types: 3 Glasses of wine per week     Comment: social- 2-3 glasses of wine    Drug use: No    Sexual activity: Not Currently     Partners: Male     Birth control/protection: None     Objective     /76   Pulse 80   Temp 97.7 °F (36.5 °C)   Resp 16   Ht 5' 5\" (1.651 m)   Wt 65.3 kg (144 lb)   LMP  (LMP Unknown)   SpO2 99%   BMI 23.96 kg/m²     Physical Exam  Vitals reviewed.   Eyes:      Extraocular Movements: Extraocular movements intact.   Cardiovascular:      Rate and Rhythm: Normal rate.      Pulses: Normal pulses.   Pulmonary:      Effort: Pulmonary effort is normal.      Breath sounds: Normal breath sounds.   Musculoskeletal:      Lumbar back: No tenderness. Normal range of motion.      Right lower leg: No lacerations or tenderness. No edema.      Left lower leg: Tenderness present. No " lacerations. No edema.        Legs:    Neurological:      Mental Status: She is alert.       Administrative Statements   I have spent a total time of 20 minutes on 05/29/24 In caring for this patient including Instructions for management, Patient and family education, Impressions, Documenting in the medical record, Reviewing / ordering tests, medicine, procedures  , and Obtaining or reviewing history  .

## 2024-06-19 ENCOUNTER — OFFICE VISIT (OUTPATIENT)
Dept: FAMILY MEDICINE CLINIC | Facility: CLINIC | Age: 64
End: 2024-06-19
Payer: COMMERCIAL

## 2024-06-19 VITALS
WEIGHT: 137.8 LBS | HEART RATE: 80 BPM | BODY MASS INDEX: 22.96 KG/M2 | OXYGEN SATURATION: 97 % | TEMPERATURE: 97.5 F | SYSTOLIC BLOOD PRESSURE: 130 MMHG | HEIGHT: 65 IN | RESPIRATION RATE: 16 BRPM | DIASTOLIC BLOOD PRESSURE: 82 MMHG

## 2024-06-19 DIAGNOSIS — M25.552 LEFT HIP PAIN: ICD-10-CM

## 2024-06-19 DIAGNOSIS — I10 ESSENTIAL HYPERTENSION: Primary | ICD-10-CM

## 2024-06-19 DIAGNOSIS — M54.50 LEFT-SIDED LOW BACK PAIN WITHOUT SCIATICA, UNSPECIFIED CHRONICITY: ICD-10-CM

## 2024-06-19 DIAGNOSIS — K21.9 GASTROESOPHAGEAL REFLUX DISEASE, UNSPECIFIED WHETHER ESOPHAGITIS PRESENT: ICD-10-CM

## 2024-06-19 DIAGNOSIS — E78.5 HYPERLIPIDEMIA, UNSPECIFIED HYPERLIPIDEMIA TYPE: ICD-10-CM

## 2024-06-19 DIAGNOSIS — M06.09 RHEUMATOID ARTHRITIS OF MULTIPLE SITES WITH NEGATIVE RHEUMATOID FACTOR (HCC): ICD-10-CM

## 2024-06-19 DIAGNOSIS — B37.2 CANDIDAL SKIN INFECTION: ICD-10-CM

## 2024-06-19 DIAGNOSIS — D72.819 LEUKOPENIA, UNSPECIFIED TYPE: ICD-10-CM

## 2024-06-19 PROCEDURE — 99214 OFFICE O/P EST MOD 30 MIN: CPT | Performed by: FAMILY MEDICINE

## 2024-06-19 RX ORDER — HYDROCHLOROTHIAZIDE 25 MG/1
25 TABLET ORAL DAILY
Qty: 90 TABLET | Refills: 3 | Status: SHIPPED | OUTPATIENT
Start: 2024-06-19

## 2024-06-19 RX ORDER — NYSTATIN 100000 U/G
CREAM TOPICAL 2 TIMES DAILY
Qty: 30 G | Refills: 1 | Status: SHIPPED | OUTPATIENT
Start: 2024-06-19

## 2024-06-24 NOTE — PROGRESS NOTES
Assessment/Plan:       Diagnoses and all orders for this visit:    Essential hypertension  -  well controlled on HCTZ  -     TSH, 3rd generation with Free T4 reflex; Future  -     hydroCHLOROthiazide 25 mg tablet; Take 1 tablet (25 mg total) by mouth daily    Hyperlipidemia  -  continue life style changes, will recheck lipids  -     Lipid Panel with Direct LDL reflex; Future    Candidal skin infection  -  nystatin cream prescribed, encouraged to follow up if not better  -     nystatin (MYCOSTATIN) cream; Apply topically 2 (two) times a day    Gastroesophageal reflux disease  -  stable on Acphex     Left low back/ hip pain  -  improving, x-rays were negative for fracture, continue Tylenol as needed, discussed PT for persistent symptoms    Rheumatoid arthritis   - follow up with Rheumatology    Leukopenia  -  follow up with Hematology        Return in 6 months or sooner as needed.   The patient understands and agrees with the treatment plan.      Subjective:   Chief Complaint   Patient presents with    Hypertension    Osteoporosis     6 month recheck    Fall     On 5/28 saw Dr. Gandhi      Patient ID: Pat Gerber is a 64 y.o. female who presents today for follow up visit for hypertension, GERD, osteoporosis. Patient reports left thigh and low back pain after a recent fall while gardening on 5/27/24, she was seen in the office on 5/28/24, her x-ray were negative for fractures. Patient states her low back and left lateral thigh pain has overall improved, she returned to work and her usual activity level.   Patient also reports itchy red rash under and between her breasts, she has been using OTC creams without significant improvement. Patient offers no other complaints.         The following portions of the patient's history were reviewed and updated as appropriate: allergies, current medications, past family history, past medical history, past social history, past surgical history and problem list.    Past  Medical History:   Diagnosis Date    Abnormal findings on diagnostic imaging of breast     Last assessed 13    Adjustment disorder with depressed mood     Last assessed 13    Anxiety     Arthritis     hands    Erythema migrans (Lyme disease)     Last assessed 13    GERD (gastroesophageal reflux disease)     Polyarthritis     Last assessed 16    Uterine leiomyoma     Vertigo      Past Surgical History:   Procedure Laterality Date    BREAST BIOPSY Left 2002    BREAST SURGERY      CARPAL TUNNEL RELEASE Bilateral 10/2008    DENTAL SURGERY      DILATION AND CURETTAGE OF UTERUS      HYSTEROSCOPY      w/D and C on 06 for irregular menses.  Her pathology demonstrated proliferative endometrium    INDUCED       MOHS RECONSTRUCTION Right 2019    Procedure: RECONSTRUCTION MOHS DEFECT RIGHT NOSE;  Surgeon: Kamlesh Damon MD;  [facility]: [facility];  Service: Plastics    MOHS RECONSTRUCTION Left 2023    Procedure: RECONSTRUCTION MOHS DEFECT OF LEFT BROW/FOREHEAD WITH LOCAL FLAP;  Surgeon: Kamlesh Damon MD;  [facility];  Service: Plastics    [place] ADJT TIS REARGMT EYE/NOSE/EAR/LIP 10.1-30.0 SQCM Right 2019    Procedure: FLAP LOCAL RIGHT NOSE VS FTSG;  Surgeon: Kamlesh Damon MD;  [facility];  Service: Plastics    SKIN BIOPSY      TONSILLECTOMY      WRIST SURGERY Right      Family History   Problem Relation Age of Onset    Arthritis Mother     Hypertension Mother     Coronary artery disease Mother     Skin cancer Mother     Varicose Veins Mother     Uterine cancer Mother     Arthritis Father     Hypertension Father     Stroke Father     Coronary artery disease Father     Other Father         Stent indications    Ulcerative colitis Sister     Rheum arthritis Maternal Grandmother     Cancer Maternal Uncle     Substance Abuse Neg Hx     Mental illness Neg Hx      Social History     Socioeconomic History    Marital status: /Civil Union      Spouse name: Not on file    Number of children: Not on file    Years of education: Not on file    Highest education level: Not on file   Occupational History    Not on file   Tobacco Use    Smoking status: Never    Smokeless tobacco: Never   Vaping Use    Vaping status: Never Used   Substance and Sexual Activity    Alcohol use: Yes     Alcohol/week: 3.0 standard drinks of alcohol     Types: 3 Glasses of wine per week     Comment: social- 2-3 glasses of wine    Drug use: No    Sexual activity: Not Currently     Partners: Male     Birth control/protection: None   Other Topics Concern    Not on file   Social History Narrative    Caffeine Use 1 cup per day    Per allscripts: Denied: History of     Zoroastrian     Social Determinants of Health     Financial Resource Strain: Not on file   Food Insecurity: Not on file   Transportation Needs: Not on file   Physical Activity: Not on file   Stress: Not on file   Social Connections: Not on file   Intimate Partner Violence: Not on file   Housing Stability: Not on file       Current Outpatient Medications:     albuterol (Ventolin HFA) 90 mcg/act inhaler, Inhale 2 puffs every 6 (six) hours as needed for wheezing or shortness of breath, Disp: 18 g, Rfl: 1    Calcium Carbonate (CALCIUM 500 PO), Take by mouth Chewable, Disp: , Rfl:     Cholecalciferol (VITAMIN D3) 1000 units CHEW, Chew, Disp: , Rfl:     fluticasone (FLONASE) 50 mcg/act nasal spray, 2 sprays into each nostril daily, Disp: 48 g, Rfl: 2    hydroCHLOROthiazide 25 mg tablet, Take 1 tablet (25 mg total) by mouth daily, Disp: 90 tablet, Rfl: 3    Ibuprofen 200 MG CAPS, Take 2 capsules by mouth 2 (two) times a day, Disp: , Rfl:     meclizine (ANTIVERT) 25 mg tablet, Take 1 tablet by mouth every 8 (eight) hours as needed for dizziness, Disp: 10 tablet, Rfl: 0    multivitamin (THERAGRAN) TABS, Take 1 tablet by mouth daily, Disp: , Rfl:     nystatin (MYCOSTATIN) cream, Apply topically 2 (two) times a day, Disp:  "30 g, Rfl: 1    RABEprazole (ACIPHEX) 20 MG tablet, Take 1 tablet (20 mg total) by mouth daily before breakfast, Disp: 90 tablet, Rfl: 1    vitamin B-12 (VITAMIN B-12) 1,000 mcg tablet, Take by mouth daily, Disp: , Rfl:     VITAMIN D PO, Take by mouth Vitamin D 500, Disp: , Rfl:     chloroquine (ARALEN) 250 MG tablet, Take 0.5 tablets (125 mg total) by mouth daily, Disp: 15 tablet, Rfl: 5    Review of Systems   Constitutional:  Negative for appetite change, diaphoresis, fatigue and fever.   Respiratory:  Negative for cough, shortness of breath and wheezing.    Cardiovascular:  Negative for chest pain, palpitations and leg swelling.   Gastrointestinal:  Negative for abdominal pain, blood in stool, diarrhea, nausea and vomiting.   Genitourinary:  Negative for difficulty urinating, dysuria, frequency, hematuria and urgency.   Musculoskeletal:         As per HPI   Skin:  Positive for rash.   Neurological:  Negative for dizziness, syncope, weakness, numbness and headaches.   Psychiatric/Behavioral:  Negative for dysphoric mood and sleep disturbance. The patient is not nervous/anxious.              Objective:    Vitals:    06/19/24 1623   BP: 130/82   Pulse: 80   Resp: 16   Temp: 97.5 °F (36.4 °C)   SpO2: 97%   Weight: 62.5 kg (137 lb 12.8 oz)   Height: 5' 5\" (1.651 m)        Physical Exam  Constitutional:       General: She is not in acute distress.     Appearance: She is well-developed.   Neck:      Thyroid: No thyromegaly.   Cardiovascular:      Rate and Rhythm: Normal rate and regular rhythm.      Heart sounds: Normal heart sounds. No murmur heard.  Pulmonary:      Effort: Pulmonary effort is normal. No respiratory distress.      Breath sounds: Normal breath sounds.   Abdominal:      General: There is no distension.      Palpations: Abdomen is soft. There is no mass.      Tenderness: There is no abdominal tenderness.   Musculoskeletal:      Cervical back: Neck supple. No tenderness.      Right lower leg: No edema.     "  Comments: Left lateral thigh resolving hematoma with trace left leg edema, no calf tenderness   Lymphadenopathy:      Cervical: No cervical adenopathy.   Skin:     Comments: Erythematous patchy rash under and in between breasts    Neurological:      Mental Status: She is alert and oriented to person, place, and time.   Psychiatric:         Mood and Affect: Mood normal.         Behavior: Behavior normal.         Thought Content: Thought content normal.        Lab Results   Component Value Date     07/01/2017    SODIUM 139 04/27/2024    K 4.3 04/27/2024    CL 99 04/27/2024    CO2 29 04/27/2024    AGAP 6 05/20/2023    BUN 10 04/27/2024    CREATININE 0.77 04/27/2024    GLUC 94 04/27/2024    GLUF 98 05/20/2023    CALCIUM 9.8 04/27/2024    AST 22 04/27/2024    ALT 14 04/27/2024    ALKPHOS 77 04/27/2024    PROT 6.6 07/01/2017    TP 6.9 04/27/2024    BILITOT 0.6 07/01/2017    TBILI 0.8 04/27/2024    EGFR 85 04/27/2024     Lab Results   Component Value Date    WBC 3.5 (L) 04/27/2024    HGB 14.2 04/27/2024    HCT 42.1 04/27/2024    .5 (H) 04/27/2024     04/27/2024

## 2024-07-24 DIAGNOSIS — J31.0 CHRONIC RHINITIS: ICD-10-CM

## 2024-07-24 DIAGNOSIS — J45.20 MILD INTERMITTENT ASTHMA WITHOUT COMPLICATION: ICD-10-CM

## 2024-07-24 RX ORDER — FLUTICASONE PROPIONATE 50 MCG
2 SPRAY, SUSPENSION (ML) NASAL DAILY
Qty: 48 G | Refills: 0 | Status: SHIPPED | OUTPATIENT
Start: 2024-07-24

## 2024-07-24 RX ORDER — ALBUTEROL SULFATE 90 UG/1
2 AEROSOL, METERED RESPIRATORY (INHALATION) EVERY 6 HOURS PRN
Qty: 18 G | Refills: 0 | Status: SHIPPED | OUTPATIENT
Start: 2024-07-24 | End: 2024-07-26 | Stop reason: CLARIF

## 2024-07-26 DIAGNOSIS — J45.20 MILD INTERMITTENT ASTHMA WITHOUT COMPLICATION: Primary | ICD-10-CM

## 2024-07-26 RX ORDER — ALBUTEROL SULFATE 90 UG/1
2 AEROSOL, METERED RESPIRATORY (INHALATION) EVERY 6 HOURS PRN
Qty: 13.4 G | Refills: 1 | Status: SHIPPED | OUTPATIENT
Start: 2024-07-26

## 2024-08-04 LAB
CHOLEST SERPL-MCNC: 185 MG/DL
CHOLEST/HDLC SERPL: 1.7 (CALC)
HDLC SERPL-MCNC: 107 MG/DL
LDLC SERPL CALC-MCNC: 61 MG/DL (CALC)
NONHDLC SERPL-MCNC: 78 MG/DL (CALC)
TRIGL SERPL-MCNC: 86 MG/DL
TSH SERPL-ACNC: 1.05 MIU/L (ref 0.4–4.5)

## 2024-08-05 ENCOUNTER — HOSPITAL ENCOUNTER (OUTPATIENT)
Dept: RADIOLOGY | Age: 64
Discharge: HOME | End: 2024-08-05
Payer: COMMERCIAL

## 2024-08-05 VITALS — WEIGHT: 137 LBS | HEIGHT: 65 IN | BODY MASS INDEX: 22.82 KG/M2

## 2024-08-05 DIAGNOSIS — Z12.31 VISIT FOR SCREENING MAMMOGRAM: ICD-10-CM

## 2024-08-05 LAB
ALBUMIN SERPL-MCNC: 4.3 G/DL (ref 3.6–5.1)
ALBUMIN/GLOB SERPL: 1.9 (CALC) (ref 1–2.5)
ALP SERPL-CCNC: 82 U/L (ref 37–153)
ALT SERPL-CCNC: 14 U/L (ref 6–29)
AST SERPL-CCNC: 21 U/L (ref 10–35)
BASOPHILS # BLD AUTO: 48 CELLS/UL (ref 0–200)
BASOPHILS NFR BLD AUTO: 1.5 %
BILIRUB SERPL-MCNC: 0.7 MG/DL (ref 0.2–1.2)
BUN SERPL-MCNC: 11 MG/DL (ref 7–25)
BUN/CREAT SERPL: NORMAL (CALC) (ref 6–22)
CALCIUM SERPL-MCNC: 9.5 MG/DL (ref 8.6–10.4)
CHLORIDE SERPL-SCNC: 101 MMOL/L (ref 98–110)
CO2 SERPL-SCNC: 31 MMOL/L (ref 20–32)
CREAT SERPL-MCNC: 0.71 MG/DL (ref 0.5–1.05)
EOSINOPHIL # BLD AUTO: 80 CELLS/UL (ref 15–500)
EOSINOPHIL NFR BLD AUTO: 2.5 %
ERYTHROCYTE [DISTWIDTH] IN BLOOD BY AUTOMATED COUNT: 10.9 % (ref 11–15)
GFR/BSA.PRED SERPLBLD CYS-BASED-ARV: 94 ML/MIN/1.73M2
GLOBULIN SER CALC-MCNC: 2.3 G/DL (CALC) (ref 1.9–3.7)
GLUCOSE SERPL-MCNC: 95 MG/DL (ref 65–99)
HCT VFR BLD AUTO: 39.2 % (ref 35–45)
HGB BLD-MCNC: 13.6 G/DL (ref 11.7–15.5)
IGA SERPL-MCNC: 292 MG/DL (ref 70–320)
IGG SERPL-MCNC: 686 MG/DL (ref 600–1540)
IGM SERPL-MCNC: 24 MG/DL (ref 50–300)
KAPPA LC FREE SER-MCNC: 13.4 MG/L (ref 3.3–19.4)
KAPPA LC FREE/LAMBDA FREE SER: 1.49 {RATIO} (ref 0.26–1.65)
LAMBDA LC FREE SERPL-MCNC: 9 MG/L (ref 5.7–26.3)
LYMPHOCYTES # BLD AUTO: 960 CELLS/UL (ref 850–3900)
LYMPHOCYTES NFR BLD AUTO: 30 %
MCH RBC QN AUTO: 35.1 PG (ref 27–33)
MCHC RBC AUTO-ENTMCNC: 34.7 G/DL (ref 32–36)
MCV RBC AUTO: 101 FL (ref 80–100)
MONOCYTES # BLD AUTO: 326 CELLS/UL (ref 200–950)
MONOCYTES NFR BLD AUTO: 10.2 %
NEUTROPHILS # BLD AUTO: 1786 CELLS/UL (ref 1500–7800)
NEUTROPHILS NFR BLD AUTO: 55.8 %
PLATELET # BLD AUTO: 234 THOUSAND/UL (ref 140–400)
PMV BLD REES-ECKER: 9.3 FL (ref 7.5–12.5)
POTASSIUM SERPL-SCNC: 4 MMOL/L (ref 3.5–5.3)
PROT SERPL-MCNC: 6.6 G/DL (ref 6.1–8.1)
RBC # BLD AUTO: 3.88 MILLION/UL (ref 3.8–5.1)
SODIUM SERPL-SCNC: 139 MMOL/L (ref 135–146)
WBC # BLD AUTO: 3.2 THOUSAND/UL (ref 3.8–10.8)

## 2024-08-05 PROCEDURE — 77063 BREAST TOMOSYNTHESIS BI: CPT

## 2024-08-05 PROCEDURE — 77067 SCR MAMMO BI INCL CAD: CPT

## 2024-08-06 NOTE — PROGRESS NOTES
Assessment and Plan:   Ms. Gerber is a 64-year-old female who presents for rheumatology follow-up of seronegative RA, OA, and osteoporosis.      Still experiencing arthritis pains in hands and knees. Has chronic RA and OA deformities of hands, but no active synovitis. She reports she has not been consistently taking chloroquine 125 mg once daily, although no side effects from the medication. I advise that she consistently take the med and monitor for improvement. She may continue to utilize OTC NSAIDs as needed.      In terms of the osteoporosis, at the last visit we discussed IV Reclast. She still would like to do some research on it. Continue calcium and vitamin D supplementation.    Follow-up in 3 months    Plan:  Diagnoses and all orders for this visit:    Seronegative rheumatoid arthritis (HCC)    Long-term use of Plaquenil    Age-related osteoporosis without current pathological fracture    Primary generalized (osteo)arthritis        I have personally reviewed prior notes, recent laboratory results, and pertinent films in PACS.     Activities as tolerated.   Exercise: try to maintain a low impact exercise regimen as much as possible. Walk for 30 minutes a day for at least 3 days a week.   Continue other medications as prescribed by PCP and other specialists.       RTC in    Rheumatic Disease Summary:  Seronegative RA  -Established care- February, 2016- long standing hx of polyarthritis using Advil.  -Serologies were negative however due to chronic RA deformities, patient was started on HCQ.  -Visit 8/23/23: re-evaluate OA vs RA.  Try meloxicam in place of ibuprofen  -[facility] ultrasound bilateral hands and wrists from 11/20/2023 revealed multifocal areas of synovial proliferation predominantly at the MCP joints bilaterally, multiple osteophytes at the MCP joints, trace joint effusion in the left fourth MCP joint, and small joint effusion with synovial proliferation and prominent marginal osteophytes at the left  fifth PIP joint.  There is also mild synovial proliferation and mild hyperemia seen along the left wrist.  -Visit 12/4/2023: Re-consider HCQ (previously d/c'd due to non-compliance) after eval from heme/onc for chronic leukopenia and hypogammaglobulinemia.  Experienced extremity swelling with meloxicam, therefore cont ibuprofen  -Visit 1/29/2024: IA of the hands.  Start  QD. Cont OTC ibu PRN.   -Visit 4/29/2024: side effects with HCQ, D/C'd.  Trial chloroquine 125 daily  2.  Osteoporosis  -DEXA 10/9/2023 osteoporosis with LS T -1.5, left total hip T -2.8, and left FN T -2.3.  10-year risk of hip fracture is 5.4% and 10-year risk of major osteoporotic fracture is 32%.     -Visit 12/4/2023: We discussed that she would benefit from osteoporosis treatment including IV Reclast once annual infusions, Prolia once every 6 months subcutaneous injections.  She will research these options and decide by the time of her next visit.  For now, continue calcium and vitamin D.  -Visit 4/29/2024: Patient will plan to research Reclast and decide if she would like to pursue this.  -May 2024 - fell down a flight of 13 stairs, no fx.  -Visit 7/8/24: would like to consider researching Reclast. Cont ca + D  3. OA  -8/2023 x-rays: Right knee moderate OA, left knee mild OA, bilateral CMC OA, bilateral feet normal, right hip with mild OA  4.  Other comorbidities: Chronic leukopenia, hypogammaglobulinemia       HPI  Ms. Gerber is a 64-year-old female who presents for rheumatology follow-up of seronegative RA, OA, and osteoporosis.     Reports she fell down 13 steps in May, but no fractures.  Still some achiness in the hips since then. Would like to research about osteoporosis meds.     C/o hand and feet pain/stiffness and swelling. Has not been taking chloroquine consistently, but no side effects when she takes it.    The following portions of the patient's history were reviewed and updated as appropriate: allergies, current  medications, past family history, past medical history, past social history, past surgical history and problem list.      Review of Systems  Constitutional: Negative for weight change, fevers, chills, night sweats   ENT/Mouth: Negative for hearing changes, ear pain, nasal congestion, sinus pain, hoarseness, sore throat, rhinorrhea, swallowing difficulty.   Eyes: Negative for pain, redness, discharge, vision changes.   Cardiovascular: Negative for chest pain, SOB, palpitations.   Respiratory: Negative for cough, sputum, wheezing, dyspnea.   Gastrointestinal: Negative for nausea, vomiting, diarrhea, constipation, pain, heartburn.  Genitourinary: Negative for dysuria, urinary frequency, hematuria.   Musculoskeletal: As per HPI.  Skin: Negative for skin rash, color changes.   Neuro: Negative for weakness, numbness, tingling, loss of consciousness.   Psych: Negative for anxiety, depression.   Heme/Lymph: Negative for easy bruising, bleeding, lymphadenopathy.        Past Medical History:   Diagnosis Date    Abnormal findings on diagnostic imaging of breast     Last assessed 13    Adjustment disorder with depressed mood     Last assessed 13    Anxiety     Arthritis     hands    Erythema migrans (Lyme disease)     Last assessed 13    GERD (gastroesophageal reflux disease)     Polyarthritis     Last assessed 16    Uterine leiomyoma     Vertigo        Past Surgical History:   Procedure Laterality Date    BREAST BIOPSY Left 2002    BREAST SURGERY      CARPAL TUNNEL RELEASE Bilateral 10/2008    DENTAL SURGERY      DILATION AND CURETTAGE OF UTERUS      HYSTEROSCOPY      w/D and C on 06 for irregular menses.  Her pathology demonstrated proliferative endometrium    INDUCED       MOHS RECONSTRUCTION Right 2019    Procedure: RECONSTRUCTION MOHS DEFECT RIGHT NOSE;  Surgeon: Kamlesh Damon MD;  Location:  MAIN OR;  Service: Plastics    MOHS RECONSTRUCTION Left 2023     Procedure: RECONSTRUCTION MOHS DEFECT OF LEFT BROW/FOREHEAD WITH LOCAL FLAP;  Surgeon: Kamlesh Damon MD;  [facility];  Service: Plastics    TX ADJT TIS REARGMT EYE/NOSE/EAR/LIP 10.1-30.0 SQCM Right 2019    Procedure: FLAP LOCAL RIGHT NOSE VS FTSG;  Surgeon: Kamlesh Damon MD;  Location: [facility];  Service: Plastics    SKIN BIOPSY      TONSILLECTOMY      WRIST SURGERY Right 2009       Social History     Socioeconomic History    Marital status: /Civil Union     Spouse name: Not on file    Number of children: Not on file    Years of education: Not on file    Highest education level: Not on file   Occupational History    Not on file   Tobacco Use    Smoking status: Never    Smokeless tobacco: Never   Vaping Use    Vaping status: Never Used   Substance and Sexual Activity    Alcohol use: Yes     Alcohol/week: 3.0 standard drinks of alcohol     Types: 3 Glasses of wine per week     Comment: social- 2-3 glasses of wine    Drug use: No    Sexual activity: Not Currently     Partners: Male     Birth control/protection: None   Other Topics Concern    Not on file   Social History Narrative    Caffeine Use 1 cup per day    Per allscripts: Denied: History of     Hoahaoism     Social Determinants of Health     Financial Resource Strain: Not on file   Food Insecurity: Not on file   Transportation Needs: Not on file   Physical Activity: Not on file   Stress: Not on file   Social Connections: Not on file   Intimate Partner Violence: Not on file   Housing Stability: Not on file       Family History   Problem Relation Age of Onset    Arthritis Mother     Hypertension Mother     Coronary artery disease Mother     Skin cancer Mother     Varicose Veins Mother     Uterine cancer Mother     Arthritis Father     Hypertension Father     Stroke Father     Coronary artery disease Father     Other Father         Stent indications    Skin cancer Sister     Ulcerative colitis Sister     Rheum  "arthritis Maternal Grandmother     No Known Problems Maternal Grandfather     No Known Problems Paternal Grandmother     Heart disease Paternal Grandfather     No Known Problems Brother     Heart disease Maternal Aunt     Heart disease Maternal Aunt     Cancer Maternal Uncle     Substance Abuse Neg Hx     Mental illness Neg Hx        Allergies   Allergen Reactions    Other Allergic Rhinitis     Seasonal  Mold         Current Outpatient Medications:     albuterol (PROVENTIL HFA,VENTOLIN HFA) 90 mcg/act inhaler, Inhale 2 puffs every 6 (six) hours as needed for wheezing or shortness of breath, Disp: 13.4 g, Rfl: 1    Calcium Carbonate (CALCIUM 500 PO), Take by mouth Chewable, Disp: , Rfl:     chloroquine (ARALEN) 250 MG tablet, Take 0.5 tablets (125 mg total) by mouth daily, Disp: 15 tablet, Rfl: 5    Cholecalciferol (VITAMIN D3) 1000 units CHEW, Chew, Disp: , Rfl:     fluticasone (FLONASE) 50 mcg/act nasal spray, 2 sprays into each nostril daily, Disp: 48 g, Rfl: 0    hydroCHLOROthiazide 25 mg tablet, Take 1 tablet (25 mg total) by mouth daily, Disp: 90 tablet, Rfl: 3    Ibuprofen 200 MG CAPS, Take 2 capsules by mouth 2 (two) times a day, Disp: , Rfl:     meclizine (ANTIVERT) 25 mg tablet, Take 1 tablet by mouth every 8 (eight) hours as needed for dizziness, Disp: 10 tablet, Rfl: 0    multivitamin (THERAGRAN) TABS, Take 1 tablet by mouth daily, Disp: , Rfl:     nystatin (MYCOSTATIN) cream, Apply topically 2 (two) times a day, Disp: 30 g, Rfl: 1    RABEprazole (ACIPHEX) 20 MG tablet, Take 1 tablet (20 mg total) by mouth daily before breakfast, Disp: 90 tablet, Rfl: 1    vitamin B-12 (VITAMIN B-12) 1,000 mcg tablet, Take by mouth daily, Disp: , Rfl:     VITAMIN D PO, Take by mouth Vitamin D 500, Disp: , Rfl:       Objective:    Vitals:    08/07/24 1545   BP: 122/70   Pulse: 73   Temp: (!) 97.3 °F (36.3 °C)   SpO2: 99%   Weight: 63 kg (139 lb)   Height: 5' 5\" (1.651 m)       Physical Exam  General: Well appearing, well " nourished, in no acute distress. Oriented x 3, normal mood and affect.  Ambulating without difficulty.  Skin: Good turgor, no rash, unusual bruising or prominent lesions.  Hair: Normal texture and distribution.  Nails: Normal color, no deformities.  HEENT:  Head: Normocephalic, atraumatic.  Eyes: Conjunctiva clear, sclera non-icteric, EOM intact.  Nose: No external lesions, mucosa non-inflamed.  Mouth: Mucous membranes moist, no mucosal lesions.  Neck: Supple   Musculoskeletal:   + Chronic synovial hypertrophy of multiple MCPs bilaterally with ulnar deviation  + Heberden's nodes and Celi's nodes bilateral hands  Neurologic: Alert and oriented. No focal neurological deficits appreciated.   Psychiatric: Normal mood and affect.       Espinoza Maloney PA-C  Rheumatology

## 2024-08-07 ENCOUNTER — OFFICE VISIT (OUTPATIENT)
Dept: RHEUMATOLOGY | Facility: CLINIC | Age: 64
End: 2024-08-07
Payer: COMMERCIAL

## 2024-08-07 VITALS
TEMPERATURE: 97.3 F | OXYGEN SATURATION: 99 % | SYSTOLIC BLOOD PRESSURE: 122 MMHG | DIASTOLIC BLOOD PRESSURE: 70 MMHG | HEART RATE: 73 BPM | HEIGHT: 65 IN | BODY MASS INDEX: 23.16 KG/M2 | WEIGHT: 139 LBS

## 2024-08-07 DIAGNOSIS — Z79.899 LONG-TERM USE OF PLAQUENIL: ICD-10-CM

## 2024-08-07 DIAGNOSIS — M15.0 PRIMARY GENERALIZED (OSTEO)ARTHRITIS: ICD-10-CM

## 2024-08-07 DIAGNOSIS — M06.00 SERONEGATIVE RHEUMATOID ARTHRITIS (HCC): Primary | ICD-10-CM

## 2024-08-07 DIAGNOSIS — M81.0 AGE-RELATED OSTEOPOROSIS WITHOUT CURRENT PATHOLOGICAL FRACTURE: ICD-10-CM

## 2024-08-07 PROCEDURE — 99213 OFFICE O/P EST LOW 20 MIN: CPT | Performed by: PHYSICIAN ASSISTANT

## 2024-08-15 ENCOUNTER — OFFICE VISIT (OUTPATIENT)
Dept: FAMILY MEDICINE CLINIC | Facility: CLINIC | Age: 64
End: 2024-08-15
Payer: COMMERCIAL

## 2024-08-15 VITALS
HEART RATE: 86 BPM | SYSTOLIC BLOOD PRESSURE: 126 MMHG | HEIGHT: 65 IN | RESPIRATION RATE: 16 BRPM | OXYGEN SATURATION: 98 % | DIASTOLIC BLOOD PRESSURE: 82 MMHG | WEIGHT: 138.5 LBS | BODY MASS INDEX: 23.07 KG/M2 | TEMPERATURE: 97.5 F

## 2024-08-15 DIAGNOSIS — S46.212A STRAIN OF LEFT BICEPS MUSCLE, INITIAL ENCOUNTER: Primary | ICD-10-CM

## 2024-08-15 PROCEDURE — 99213 OFFICE O/P EST LOW 20 MIN: CPT | Performed by: NURSE PRACTITIONER

## 2024-08-15 RX ORDER — DOXYCYCLINE 100 MG/1
100 CAPSULE ORAL 2 TIMES DAILY WITH MEALS
COMMUNITY
Start: 2024-08-12

## 2024-08-15 RX ORDER — TACROLIMUS 1 MG/G
OINTMENT TOPICAL
COMMUNITY
Start: 2024-08-12

## 2024-08-15 NOTE — PROGRESS NOTES
"Ambulatory Visit  Name: Pat Gerber      : 1960      MRN: [id number]  Encounter Provider: JUDI Farias  Encounter Date: 8/15/2024   Encounter department: [facility]    Assessment & Plan   1. Strain of left biceps muscle, initial encounter  -     Ambulatory Referral to Orthopedic Surgery; Future  -     Ambulatory Referral to Physical Therapy; Future    Most likely a strain of patient's left biceps. Since she has not allowed it to rest, she has continued to aggravate it. We will send her to PT to work on shoulder ROM. Ortho if not better after PT. No need for shoulder xray at this time. We can consider this if pain persists or worsens. Ibuprofen PRN for pain. Pt encouraged to take this w/ food. Patient is encouraged to call our office for any questions/concerns, persistent or worsening symptoms. Patient states they understand and agree with treatment plan.         Pt to f/u PRN.    Depression Screening and Follow-up Plan: Patient was screened for depression during today's encounter. They screened negative with a PHQ-2 score of 0.    Falls Plan of Care: balance, strength, and gait training instructions were provided.       History of Present Illness     Pt presents today for left upper biceps pain after encountering a fall on her back left side back in late May.  She notes she fell down 13 steps on concrete floor.  She was evaluated in our office and sent for xrays.  She denies LOC or head strike.  Pt notes she has kept busy and has not let her arm rest.  She notes pain to her upper biceps.  Denies swelling, redness or heat.  Pt notes her pain goes away if she takes #3 ibuprofen at once.   Pt notes her pain is aggravated w/ movement.            Review of Systems  As noted per HPI.    Objective     /82   Pulse 86   Temp 97.5 °F (36.4 °C)   Resp 16   Ht 5' 5\" (1.651 m)   Wt 62.8 kg (138 lb 8 oz)   LMP  (LMP Unknown)   SpO2 98%   BMI " 23.05 kg/m²     Physical Exam  Vitals reviewed.   Constitutional:       Appearance: Normal appearance.   Pulmonary:      Effort: Pulmonary effort is normal.   Musculoskeletal:      Left shoulder: No swelling or effusion. Decreased range of motion (decreased left shoulder flexion to 90 degrees d/t pain).      Left upper arm: No edema or tenderness.   Neurological:      Mental Status: She is alert and oriented to person, place, and time. Mental status is at baseline.   Psychiatric:         Mood and Affect: Mood normal.         Behavior: Behavior normal.         Thought Content: Thought content normal.         Judgment: Judgment normal.

## 2024-09-11 DIAGNOSIS — J45.20 MILD INTERMITTENT ASTHMA WITHOUT COMPLICATION: ICD-10-CM

## 2024-09-11 RX ORDER — ALBUTEROL SULFATE 90 UG/1
2 AEROSOL, METERED RESPIRATORY (INHALATION) EVERY 6 HOURS PRN
Qty: 54 G | Refills: 1 | Status: SHIPPED | OUTPATIENT
Start: 2024-09-11

## 2024-09-11 NOTE — TELEPHONE ENCOUNTER
Reason for call:   [x] Refill   [] Prior Auth  [x] Other: Needs in generic     Office:   [] PCP/Provider -   [x] Specialty/Provider -     Medication: albuterol (PROVENTIL HFA,VENTOLIN HFA) 90 mcg/act inhaler     Dose/Frequency: Inhale 2 puffs every 6 (six) hours as needed for wheezing or shortness of breath     Quantity: 13.4    Pharmacy: [facility] [address] [phone]    Does the patient have enough for 3 days?   [x] Yes   [] No - Send as HP to POD

## 2024-10-02 ENCOUNTER — OFFICE VISIT (OUTPATIENT)
Dept: HEMATOLOGY ONCOLOGY | Facility: CLINIC | Age: 64
End: 2024-10-02
Payer: COMMERCIAL

## 2024-10-02 VITALS
DIASTOLIC BLOOD PRESSURE: 84 MMHG | TEMPERATURE: 97.6 F | WEIGHT: 139 LBS | OXYGEN SATURATION: 99 % | BODY MASS INDEX: 23.16 KG/M2 | RESPIRATION RATE: 17 BRPM | HEIGHT: 65 IN | HEART RATE: 80 BPM | SYSTOLIC BLOOD PRESSURE: 128 MMHG

## 2024-10-02 DIAGNOSIS — D75.89 MACROCYTOSIS: ICD-10-CM

## 2024-10-02 DIAGNOSIS — R53.82 CHRONIC FATIGUE: ICD-10-CM

## 2024-10-02 DIAGNOSIS — D80.1 HYPOGAMMAGLOBULINEMIA (HCC): ICD-10-CM

## 2024-10-02 DIAGNOSIS — R79.0 LOW FERRITIN: ICD-10-CM

## 2024-10-02 DIAGNOSIS — D72.819 LEUKOPENIA, UNSPECIFIED TYPE: Primary | ICD-10-CM

## 2024-10-02 DIAGNOSIS — E53.8 FOLATE DEFICIENCY: ICD-10-CM

## 2024-10-02 PROCEDURE — 99214 OFFICE O/P EST MOD 30 MIN: CPT | Performed by: NURSE PRACTITIONER

## 2024-10-02 NOTE — PROGRESS NOTES
[facility] [place]  [address] [address]  [phone]  Hematology Ambulatory Follow-Up  Pat Gerber, 1960, [id number]  10/2/2024    Assessment/Plan:    1. Leukopenia, unspecified type  2. Hypogammaglobulinemia (HCC)  3. Macrocytosis  4. Folate deficiency  5. Low ferritin  6. Chronic fatigue   Patient is a 64-year-old female with a history of hypertension, rheumatoid arthritis, GERD, osteoporosis, arthritis, anxiety, and leukopenia.  WBC has intermittently been decreased since 2016.  Most recent labs from 8/3/2024 show a WBC count of 3.2, within her established range, normal RBC hemoglobin and hematocrit.  MCV has been elevated at 101.0, normal platelets and differential.  CMP within normal limits, creatinine 0.71 with an EGFR of 94.  Quantitative immunoglobulins shows a decreased IgM kappa lambda light chains within normal limits ratio 1.49, previous SPEP was consistent with hypogammaglobulinemia with a gammaglobulin 0.6.  Overall patient is able to function without restriction.  She reports fatigue.  We will continue to monitor with repeat labs and see her in 6 months.  Patient is up-to-date on colonoscopy and mammogram.    - CBC and differential; Future  - Immunoglobulin free LT chains blood; Future  - IgG, IgA, IgM; Future  - Folate; Future  - Protein electrophoresis, serum; Future  - Iron Panel (Includes Ferritin, Iron Sat%, Iron, and TIBC); Future  - CBC and differential  - Immunoglobulin free LT chains blood  - Folate  - Protein electrophoresis, serum    Patient verbalized understanding and is in agreement to the plan.   Patient knows to call the Hematology/Oncology office with any questions and concerns regarding the above.    Barrier(s) to care: None  The patient is able to self care.    Bhavana LAU  Medical Oncology/Hematology  [facility]    Interval history: clinically stable    Review of  Systems   Constitutional:  Positive for fatigue. Negative for activity change, appetite change, fever and unexpected weight change.   Respiratory:  Negative for cough and shortness of breath.    Cardiovascular:  Negative for chest pain and leg swelling.   Gastrointestinal:  Negative for abdominal pain, constipation, diarrhea and nausea.   Endocrine: Negative for cold intolerance and heat intolerance.   Musculoskeletal:  Negative for arthralgias and myalgias.   Skin: Negative.    Neurological:  Negative for dizziness, weakness and headaches.   Hematological:  Negative for adenopathy. Does not bruise/bleed easily.     Past Medical History:   Diagnosis Date    Abnormal findings on diagnostic imaging of breast     Last assessed 13    Adjustment disorder with depressed mood     Last assessed 13    Anxiety     Arthritis     hands    Erythema migrans (Lyme disease)     Last assessed 13    GERD (gastroesophageal reflux disease)     Polyarthritis     Last assessed 16    Uterine leiomyoma     Vertigo      Past Surgical History:   Procedure Laterality Date    BREAST BIOPSY Left 2002    BREAST SURGERY      CARPAL TUNNEL RELEASE Bilateral 10/2008    DENTAL SURGERY      DILATION AND CURETTAGE OF UTERUS      HYSTEROSCOPY      w/D and C on 06 for irregular menses.  Her pathology demonstrated proliferative endometrium    INDUCED       MOHS RECONSTRUCTION Right 2019    Procedure: RECONSTRUCTION MOHS DEFECT RIGHT NOSE;  Surgeon: Kamlesh Damon MD;  [facility];  Service: Plastics    MOHS RECONSTRUCTION Left 2023    Procedure: RECONSTRUCTION MOHS DEFECT OF LEFT BROW/FOREHEAD WITH LOCAL FLAP;  Surgeon: Kamlesh Damon MD;  [facility];  Service: Plastics    OR ADJT TIS REARGMT EYE/NOSE/EAR/LIP 10.1-30.0 SQCM Right 2019    Procedure: FLAP LOCAL RIGHT NOSE VS FTSG;  Surgeon: Kamlesh Damon MD;  [facility];  Service: Plastics    SKIN BIOPSY  "     TONSILLECTOMY      WRIST SURGERY Right 2009     Current Outpatient Medications:     albuterol (PROVENTIL HFA,VENTOLIN HFA) 90 mcg/act inhaler, Inhale 2 puffs every 6 (six) hours as needed for wheezing or shortness of breath, Disp: 54 g, Rfl: 1    Calcium Carbonate (CALCIUM 500 PO), Take by mouth Chewable, Disp: , Rfl:     Cholecalciferol (VITAMIN D3) 1000 units CHEW, Chew, Disp: , Rfl:     doxycycline hyclate (VIBRAMYCIN) 100 mg capsule, Take 100 mg by mouth 2 (two) times a day with meals, Disp: , Rfl:     fluticasone (FLONASE) 50 mcg/act nasal spray, 2 sprays into each nostril daily, Disp: 48 g, Rfl: 0    hydroCHLOROthiazide 25 mg tablet, Take 1 tablet (25 mg total) by mouth daily, Disp: 90 tablet, Rfl: 3    Ibuprofen 200 MG CAPS, Take 2 capsules by mouth 2 (two) times a day, Disp: , Rfl:     meclizine (ANTIVERT) 25 mg tablet, Take 1 tablet by mouth every 8 (eight) hours as needed for dizziness, Disp: 10 tablet, Rfl: 0    multivitamin (THERAGRAN) TABS, Take 1 tablet by mouth daily, Disp: , Rfl:     RABEprazole (ACIPHEX) 20 MG tablet, Take 1 tablet (20 mg total) by mouth daily before breakfast, Disp: 90 tablet, Rfl: 1    vitamin B-12 (VITAMIN B-12) 1,000 mcg tablet, Take by mouth daily, Disp: , Rfl:     VITAMIN D PO, Take by mouth Vitamin D 500, Disp: , Rfl:     chloroquine (ARALEN) 250 MG tablet, Take 0.5 tablets (125 mg total) by mouth daily, Disp: 15 tablet, Rfl: 5    nystatin (MYCOSTATIN) cream, Apply topically 2 (two) times a day (Patient not taking: Reported on 10/2/2024), Disp: 30 g, Rfl: 1    tacrolimus (PROTOPIC) 0.1 % ointment, APPLY TO AFFECTED SKIN TWICE A DAY AS NEEDED (Patient not taking: Reported on 10/2/2024), Disp: , Rfl:     Allergies   Allergen Reactions    Other Allergic Rhinitis     Seasonal       Objective:  /84 (BP Location: Left arm, Patient Position: Sitting, Cuff Size: Adult)   Pulse 80   Temp 97.6 °F (36.4 °C)   Resp 17   Ht 5' 5\" (1.651 m)   Wt 63 kg (139 lb)   LMP  (LMP " Unknown)   SpO2 99%   BMI 23.13 kg/m²    Physical Exam  Vitals reviewed.   Constitutional:       Appearance: Normal appearance. She is well-developed.   HENT:      Head: Normocephalic and atraumatic.   Eyes:      Conjunctiva/sclera: Conjunctivae normal.      Pupils: Pupils are equal, round, and reactive to light.   Pulmonary:      Effort: Pulmonary effort is normal. No respiratory distress.   Musculoskeletal:         General: Normal range of motion.      Cervical back: Normal range of motion.   Lymphadenopathy:      Cervical: No cervical adenopathy.   Skin:     General: Skin is dry.   Neurological:      Mental Status: She is alert and oriented to person, place, and time.   Psychiatric:         Behavior: Behavior normal.     Result Review  Labs:  Lab Results   Component Value Date    WBC 3.2 (L) 08/03/2024    HGB 13.6 08/03/2024    HCT 39.2 08/03/2024    .0 (H) 08/03/2024     08/03/2024     Lab Results   Component Value Date     07/01/2017    SODIUM 139 08/03/2024    K 4.0 08/03/2024     08/03/2024    CO2 31 08/03/2024    AGAP 6 05/20/2023    BUN 11 08/03/2024    CREATININE 0.71 08/03/2024    GLUC 95 08/03/2024    GLUF 98 05/20/2023    CALCIUM 9.5 08/03/2024    AST 21 08/03/2024    ALT 14 08/03/2024    ALKPHOS 82 08/03/2024    PROT 6.6 07/01/2017    TP 6.6 08/03/2024    BILITOT 0.6 07/01/2017    TBILI 0.7 08/03/2024    EGFR 94 08/03/2024     Imaging:  No results found.    Please note:  This report has been generated by a voice recognition software system. Therefore there may be syntax, spelling, and/or grammatical errors. Please call if you have any questions.

## 2024-10-04 DIAGNOSIS — J31.0 CHRONIC RHINITIS: ICD-10-CM

## 2024-10-04 RX ORDER — FLUTICASONE PROPIONATE 50 MCG
2 SPRAY, SUSPENSION (ML) NASAL DAILY
Qty: 48 G | Refills: 1 | Status: SHIPPED | OUTPATIENT
Start: 2024-10-04

## 2024-10-11 ENCOUNTER — APPOINTMENT (OUTPATIENT)
Dept: RADIOLOGY | Facility: AMBULARY SURGERY CENTER | Age: 64
End: 2024-10-11
Payer: COMMERCIAL

## 2024-10-11 ENCOUNTER — OFFICE VISIT (OUTPATIENT)
Dept: OBGYN CLINIC | Facility: CLINIC | Age: 64
End: 2024-10-11
Payer: COMMERCIAL

## 2024-10-11 VITALS
DIASTOLIC BLOOD PRESSURE: 89 MMHG | WEIGHT: 138 LBS | HEIGHT: 65 IN | HEART RATE: 81 BPM | BODY MASS INDEX: 22.99 KG/M2 | SYSTOLIC BLOOD PRESSURE: 125 MMHG

## 2024-10-11 DIAGNOSIS — G89.29 CHRONIC LEFT SHOULDER PAIN: Primary | ICD-10-CM

## 2024-10-11 DIAGNOSIS — M25.512 CHRONIC LEFT SHOULDER PAIN: Primary | ICD-10-CM

## 2024-10-11 DIAGNOSIS — G89.29 CHRONIC LEFT SHOULDER PAIN: ICD-10-CM

## 2024-10-11 DIAGNOSIS — S46.212A STRAIN OF LEFT BICEPS MUSCLE, INITIAL ENCOUNTER: ICD-10-CM

## 2024-10-11 DIAGNOSIS — M25.512 CHRONIC LEFT SHOULDER PAIN: ICD-10-CM

## 2024-10-11 PROCEDURE — 20610 DRAIN/INJ JOINT/BURSA W/O US: CPT | Performed by: PHYSICAL MEDICINE & REHABILITATION

## 2024-10-11 PROCEDURE — 99214 OFFICE O/P EST MOD 30 MIN: CPT | Performed by: PHYSICAL MEDICINE & REHABILITATION

## 2024-10-11 PROCEDURE — 73030 X-RAY EXAM OF SHOULDER: CPT

## 2024-10-11 RX ORDER — TRIAMCINOLONE ACETONIDE 40 MG/ML
80 INJECTION, SUSPENSION INTRA-ARTICULAR; INTRAMUSCULAR
Status: COMPLETED | OUTPATIENT
Start: 2024-10-11 | End: 2024-10-11

## 2024-10-11 RX ORDER — ROPIVACAINE HYDROCHLORIDE 5 MG/ML
10 INJECTION, SOLUTION EPIDURAL; INFILTRATION; PERINEURAL
Status: COMPLETED | OUTPATIENT
Start: 2024-10-11 | End: 2024-10-11

## 2024-10-11 RX ADMIN — TRIAMCINOLONE ACETONIDE 80 MG: 40 INJECTION, SUSPENSION INTRA-ARTICULAR; INTRAMUSCULAR at 14:30

## 2024-10-11 RX ADMIN — ROPIVACAINE HYDROCHLORIDE 10 ML: 5 INJECTION, SOLUTION EPIDURAL; INFILTRATION; PERINEURAL at 14:30

## 2024-10-11 NOTE — PROGRESS NOTES
1. Chronic left shoulder pain    2. Strain of left biceps muscle, initial encounter      Orders Placed This Encounter   Procedures    Large joint arthrocentesis    XR shoulder 2+ vw left    Ambulatory referral to Physical Therapy     No orders of the defined types were placed in this encounter.      Impression:  Left shoulder pain likely secondary to rotator cuff arthropathy.  We discussed different treatment options and decided to proceed with a subacromial space steroid injection.  She felt well afterwards.  I will see her back in 4 weeks to reassess.  She would benefit from formal PT.    Imaging Studies (I personally reviewed images in PACS and report):  Left shoulder x-rays most recent to this encounter reviewed.  These images show possible chronic avulsion fracture just inferior to the distal end of the clavicle.  No significant osteoarthritic changes.    No follow-ups on file.    Patient is in agreement with the above plan.    HPI:  Pat Gerber is a 64 y.o. female  who presents for evaluation of   Chief Complaint   Patient presents with    Left Shoulder - Pain     Pt presents with complaints of left shoulder pain after a fall down 14 steps in the end of May. Reports she landed on her left side and has been having left shoulder pain since.        Onset/Mechanism: As above.  Location: Lateral shoulder.  Radiation: Denies.  Provocative: Reaching motions.  Severity: Painful.  Associated Symptoms: Trouble sleeping on that side.  Treatment so far: No recent treatment.    Following history reviewed and updated:  Past Medical History:   Diagnosis Date    Abnormal findings on diagnostic imaging of breast     Last assessed 08/16/13    Adjustment disorder with depressed mood     Last assessed 05/17/13    Anxiety     Arthritis     hands    Erythema migrans (Lyme disease)     Last assessed 06/29/13    GERD (gastroesophageal reflux disease)     Polyarthritis     Last assessed 02/17/16    Uterine leiomyoma     Vertigo       Past Surgical History:   Procedure Laterality Date    BREAST BIOPSY Left 2002    BREAST SURGERY      CARPAL TUNNEL RELEASE Bilateral 10/2008    DENTAL SURGERY      DILATION AND CURETTAGE OF UTERUS      HYSTEROSCOPY      w/D and C on 06 for irregular menses.  Her pathology demonstrated proliferative endometrium    INDUCED       MOHS RECONSTRUCTION Right 2019    Procedure: RECONSTRUCTION MOHS DEFECT RIGHT NOSE;  Surgeon: Kamlesh Damon MD;  Location: [facility];  Service: Plastics    MOHS RECONSTRUCTION Left 2023    Procedure: RECONSTRUCTION MOHS DEFECT OF LEFT BROW/FOREHEAD WITH LOCAL FLAP;  Surgeon: Kamlesh Damon MD;  [facility];  Service: Plastics    SC ADJT TIS REARGMT EYE/NOSE/EAR/LIP 10.1-30.0 SQCM Right 2019    Procedure: FLAP LOCAL RIGHT NOSE VS FTSG;  Surgeon: Kamlesh Damon MD;  Location: [facility];  Service: Plastics    SKIN BIOPSY      TONSILLECTOMY      WRIST SURGERY Right      Social History   Social History     Substance and Sexual Activity   Alcohol Use Yes    Alcohol/week: 3.0 standard drinks of alcohol    Types: 3 Glasses of wine per week    Comment: social- 2-3 glasses of wine     Social History     Substance and Sexual Activity   Drug Use No     Social History     Tobacco Use   Smoking Status Never   Smokeless Tobacco Never     Family History   Problem Relation Age of Onset    Arthritis Mother     Hypertension Mother     Coronary artery disease Mother     Skin cancer Mother     Varicose Veins Mother     Uterine cancer Mother     Arthritis Father     Hypertension Father     Stroke Father     Coronary artery disease Father     Other Father         Stent indications    Skin cancer Sister     Ulcerative colitis Sister     Rheum arthritis Maternal Grandmother     No Known Problems Maternal Grandfather     No Known Problems Paternal Grandmother     Heart disease Paternal Grandfather     No Known Problems Brother     Heart disease  "Maternal Aunt     Heart disease Maternal Aunt     Cancer Maternal Uncle     Substance Abuse Neg Hx     Mental illness Neg Hx      Allergies   Allergen Reactions    Other Allergic Rhinitis     Seasonal          Constitutional:  /89   Pulse 81   Ht 5' 5\" (1.651 m)   Wt 62.6 kg (138 lb)   LMP  (LMP Unknown)   BMI 22.96 kg/m²    General: NAD.  Eyes: Anicteric sclerae.  Neck: Supple.  Lungs: Unlabored breathing.  Cardiovascular: No lower extremity edema.  Skin: Intact without erythema.  Neurologic: Sensation intact to light touch.  Psychiatric: Mood and affect are appropriate.    Left Shoulder Exam     Tenderness   The patient is experiencing tenderness in the acromion.    Range of Motion   Active abduction:  abnormal   Passive abduction:  normal   Forward flexion:  abnormal     Tests   Gerebr test: positive  Impingement: positive              Large joint arthrocentesis: L subacromial bursa  Bigelow Protocol:  procedure performed by consultantConsent: Verbal consent obtained. Written consent not obtained.  Risks and benefits: risks, benefits and alternatives were discussed  Consent given by: patient  Timeout called at: 10/11/2024 2:41 PM.  Patient understanding: patient states understanding of the procedure being performed  Site marked: the operative site was marked  Patient identity confirmed: verbally with patient  Supporting Documentation  Indications: pain   Procedure Details  Location: shoulder - L subacromial bursa  Needle gauge: 21G 2''  Ultrasound guidance: no  Approach: posterolateral  Medications administered: 80 mg triamcinolone acetonide 40 mg/mL; 10 mL ropivacaine 0.5 %    Patient tolerance: patient tolerated the procedure well with no immediate complications  Dressing:  Sterile dressing applied    There was little to no resistance encountered during the injection.    Risks of this procedure include:    - Risk of bleeding since a needle is involved.  - Risk of infection (1/10,000 chance as per " recent studies).  Signs/symptoms were discussed and they would prompt an urgent evaluation at an emergency department.  - Risk of pigmentation or skin dimpling in the skin (2-3% chance as per recent studies) from the steroid.  - Risk of increased pain from steroid flare (1% chance as per recent studies) that typically lasts 24-48 hours.  - Risk of increased blood sugars from the steroid medication that can last for a few weeks.  If the patient is a diabetic or pre-diabetic, they were encouraged to closely monitor their blood sugars and discuss with PCP if elevated more than usual or if having symptoms.    The benefits outweigh the risks and so the procedure was completed.

## 2024-11-14 ENCOUNTER — EVALUATION (OUTPATIENT)
Dept: PHYSICAL THERAPY | Facility: REHABILITATION | Age: 64
End: 2024-11-14
Payer: COMMERCIAL

## 2024-11-14 DIAGNOSIS — G89.29 CHRONIC LEFT SHOULDER PAIN: ICD-10-CM

## 2024-11-14 DIAGNOSIS — M25.512 CHRONIC LEFT SHOULDER PAIN: ICD-10-CM

## 2024-11-14 PROCEDURE — 97110 THERAPEUTIC EXERCISES: CPT | Performed by: PHYSICAL THERAPIST

## 2024-11-14 PROCEDURE — 97161 PT EVAL LOW COMPLEX 20 MIN: CPT | Performed by: PHYSICAL THERAPIST

## 2024-11-14 NOTE — PROGRESS NOTES
PT Evaluation     Today's date: 2024  Patient name: Pat Gerber  : 1960  MRN: [id number]  Referring provider: Kanu Holman DO  Dx:   Encounter Diagnosis     ICD-10-CM    1. Chronic left shoulder pain  M25.512 Ambulatory referral to Physical Therapy    G89.29                      Assessment  Impairments: abnormal or restricted ROM, impaired physical strength, lacks appropriate home exercise program and pain with function    Assessment details: Pat Gerber is a 64 y.o. female who presents with pain, decreased strength, and decreased ROM.  Due to these impairments, patient has difficulty performing a/iadls, recreational activities, and work-related activities. Patient's clinical presentation is consistent with their referring diagnosis of chronic left shoulder pain.  Patient would benefit from skilled physical therapy to address their aforementioned impairments, improve their level of function and to improve their overall quality of life.  Patient states she wishes to attend one or two further PT treatment sessions and then continue exercises independently at home.     Goals  Long term goals - to be achieved by discharge:    Overhead reaching is improved to maximal level of function.   Lifting is improved to maximal level of function.    IADL performance in related activities is improved to maximal level of function.    Performance in related work activities is improved to maximal level of function.     Plan    Planned therapy interventions: manual therapy, neuromuscular re-education, patient/caregiver education, strengthening, stretching, therapeutic activities, therapeutic exercise and home exercise program    Treatment plan discussed with: patient  Plan details: 1-2 additional PT treatment sessions        Subjective Evaluation    History of Present Illness  Mechanism of injury: Patient refers to PT with c/o pain in her left shoulder.  Patient received X-rays of her left shoulder on  10/11/24 which were negative for significant findings.  Patient states pain began approximately six months previous of insidious onset.  Patient c/o pain with overhead reaching and lifting activities.  Patient denies numbness and tingling in her left UE.  Patient states decreased pain in her shoulder since receiving injection on 10/11/24.  Patient states her job requires intermittent lifting activities.   Pain  Current pain ratin  At best pain ratin  At worst pain ratin          Objective     Active Range of Motion   Left Shoulder   Flexion: 135 degrees   Abduction: 135 degrees   External rotation BTH: T2   Internal rotation BTB: T7     Passive Range of Motion   Left Shoulder   Flexion: 160 degrees   Abduction: 160 degrees   External rotation 90°: 82 degrees   Internal rotation 90°: 70 degrees     Strength/Myotome Testing     Left Shoulder     Planes of Motion   Flexion: 4- (pain)   Abduction: 3+ (pain)   External rotation at 0°: 4   Internal rotation at 0°: 4     Tests     Left Shoulder   Positive empty can and Hawkin's.   Negative drop arm.              Precautions: Hx of Vertigo, Anxiety, HTN      Manuals                                                                 Neuro Re-Ed             Prone I's             Prone T's             Prone Y's             Supine Serratus punch                                                    Ther Ex             UBE             Sup wand flex HEP            TB ER HEP            TB IR HEP            TB shld ext HEP            TB rows HEP                                      Ther Activity                                       Gait Training                                       Modalities                                          HEP access code: [email or web address]

## 2024-11-16 ENCOUNTER — OFFICE VISIT (OUTPATIENT)
Dept: OBGYN CLINIC | Facility: CLINIC | Age: 64
End: 2024-11-16
Payer: COMMERCIAL

## 2024-11-16 VITALS — DIASTOLIC BLOOD PRESSURE: 85 MMHG | HEART RATE: 80 BPM | SYSTOLIC BLOOD PRESSURE: 150 MMHG

## 2024-11-16 DIAGNOSIS — M25.512 CHRONIC LEFT SHOULDER PAIN: Primary | ICD-10-CM

## 2024-11-16 DIAGNOSIS — G89.29 CHRONIC LEFT SHOULDER PAIN: Primary | ICD-10-CM

## 2024-11-16 PROCEDURE — 99214 OFFICE O/P EST MOD 30 MIN: CPT | Performed by: PHYSICAL MEDICINE & REHABILITATION

## 2024-11-16 RX ORDER — NAPROXEN 500 MG/1
500 TABLET ORAL 2 TIMES DAILY PRN
Qty: 60 TABLET | Refills: 0 | Status: SHIPPED | OUTPATIENT
Start: 2024-11-16

## 2024-11-16 NOTE — PROGRESS NOTES
1. Chronic left shoulder pain  naproxen (NAPROSYN) 500 mg tablet        No orders of the defined types were placed in this encounter.       Impression:  Patient is here in follow up of left shoulder pain likely secondary to rotator cuff arthropathy.  Treatment has included subacromial space steroid injection and she just started PT. she will continue with physical therapy.  I have prescribed her naproxen.  I will see her back in 4-5 weeks to reassess. If still symptomatic, would consider advanced imaging.     Imaging Studies (I personally reviewed images in PACS and report):  Left shoulder x-rays most recent to this encounter reviewed.  These images show possible chronic avulsion fracture just inferior to the distal end of the clavicle.  No significant osteoarthritic changes.    No follow-ups on file.    Patient is in agreement with the above plan.    HPI:  Pat Gerber is a 64 y.o. female  who presents in follow up.  Here for   Chief Complaint   Patient presents with    Left Shoulder - Follow-up, Pain     Pt reports some relief from injection, just began physical therapy this week due to scheduling conflicts       Since last visit: See above.    Following history reviewed and updated:  Past Medical History:   Diagnosis Date    Abnormal findings on diagnostic imaging of breast     Last assessed 08/16/13    Adjustment disorder with depressed mood     Last assessed 05/17/13    Anxiety     Arthritis     hands    Erythema migrans (Lyme disease)     Last assessed 06/29/13    GERD (gastroesophageal reflux disease)     Polyarthritis     Last assessed 02/17/16    Uterine leiomyoma     Vertigo      Past Surgical History:   Procedure Laterality Date    BREAST BIOPSY Left 02/2002    BREAST SURGERY      CARPAL TUNNEL RELEASE Bilateral 10/2008    DENTAL SURGERY      DILATION AND CURETTAGE OF UTERUS      HYSTEROSCOPY      w/D and C on 11/20/06 for irregular menses.  Her pathology demonstrated proliferative endometrium     INDUCED       MOHS RECONSTRUCTION Right 2019    Procedure: RECONSTRUCTION MOHS DEFECT RIGHT NOSE;  Surgeon: Kamlesh Damon MD;  Location: [facility];  Service: Plastics    MOHS RECONSTRUCTION Left 2023    Procedure: RECONSTRUCTION MOHS DEFECT OF LEFT BROW/FOREHEAD WITH LOCAL FLAP;  Surgeon: Kamlesh Damon MD;  [facility];  Service: Plastics    LA ADJT TIS REARGMT EYE/NOSE/EAR/LIP 10.1-30.0 SQCM Right 2019    Procedure: FLAP LOCAL RIGHT NOSE VS FTSG;  Surgeon: Kamlesh Damon MD;  Location: [facility];  Service: Plastics    SKIN BIOPSY      TONSILLECTOMY      WRIST SURGERY Right      Social History   Social History     Substance and Sexual Activity   Alcohol Use Yes    Alcohol/week: 3.0 standard drinks of alcohol    Types: 3 Glasses of wine per week    Comment: social- 2-3 glasses of wine     Social History     Substance and Sexual Activity   Drug Use No     Social History     Tobacco Use   Smoking Status Never   Smokeless Tobacco Never     Family History   Problem Relation Age of Onset    Arthritis Mother     Hypertension Mother     Coronary artery disease Mother     Skin cancer Mother     Varicose Veins Mother     Uterine cancer Mother     Arthritis Father     Hypertension Father     Stroke Father     Coronary artery disease Father     Other Father         Stent indications    Skin cancer Sister     Ulcerative colitis Sister     Rheum arthritis Maternal Grandmother     No Known Problems Maternal Grandfather     No Known Problems Paternal Grandmother     Heart disease Paternal Grandfather     No Known Problems Brother     Heart disease Maternal Aunt     Heart disease Maternal Aunt     Cancer Maternal Uncle     Substance Abuse Neg Hx     Mental illness Neg Hx      Allergies   Allergen Reactions    Other Allergic Rhinitis     Seasonal          Constitutional:  /85   Pulse 80   LMP  (LMP Unknown)    General: NAD.  Eyes: Clear sclerae.  ENT: No inflammation,  lesion, or mass of lips.  No tracheal deviation.  Musculoskeletal: As mentioned below.  Integumentary: No visible rashes or skin lesions.  Pulmonary/Chest: Effort normal. No respiratory distress.   Neuro: CN's grossly intact, BETHEA.  Psych: Normal affect and judgement.  Vascular: WWP.    Left Shoulder Exam     Tenderness   The patient is experiencing tenderness in the acromion.    Other   Erythema: absent  Scars: absent  Sensation: normal  Pulse: present              Procedures

## 2024-12-02 ENCOUNTER — APPOINTMENT (OUTPATIENT)
Dept: PHYSICAL THERAPY | Facility: REHABILITATION | Age: 64
End: 2024-12-02
Payer: COMMERCIAL

## 2024-12-09 ENCOUNTER — OFFICE VISIT (OUTPATIENT)
Dept: PHYSICAL THERAPY | Facility: REHABILITATION | Age: 64
End: 2024-12-09
Payer: COMMERCIAL

## 2024-12-09 DIAGNOSIS — G89.29 CHRONIC LEFT SHOULDER PAIN: Primary | ICD-10-CM

## 2024-12-09 DIAGNOSIS — M25.512 CHRONIC LEFT SHOULDER PAIN: Primary | ICD-10-CM

## 2024-12-09 PROCEDURE — 97110 THERAPEUTIC EXERCISES: CPT | Performed by: PHYSICAL THERAPIST

## 2024-12-09 NOTE — PROGRESS NOTES
"Daily Note     Today's date: 2024  Patient name: Pat Gerber  : 1960  MRN: [id number]  Referring provider: Kanu Holman DO  Dx:   Encounter Diagnosis     ICD-10-CM    1. Chronic left shoulder pain  M25.512     G89.29           Start Time: 1732  Stop Time: 1745  Total time in clinic (min): 13 minutes    Subjective: Pt reports she hasn't really been doing the exercises due to time. Would like exercises she can do more easily at work. Reports her pain is the same depending on what she is doing.      Objective: See treatment diary below  Access Code: [email or web address]  URL: [email or web address]  Date: 2024  Prepared by: Kendra Azul    Exercises  - Standing Isometric Shoulder Internal Rotation at Doorway  - 1 x daily - 7 x weekly - 2-3 sets - 10 reps - 5 hold  - Isometric Shoulder External Rotation at Wall  - 1 x daily - 7 x weekly - 2-3 sets - 10 reps - 5 hold  - Isometric Shoulder Extension at Wall  - 1 x daily - 7 x weekly - 2-3 sets - 10 reps - 5 hold  - Isometric Shoulder Flexion at Wall  - 1 x daily - 7 x weekly - 2-3 sets - 10 reps - 5 hold  - Serratus Forearm Push Up Plus at Wall with Elbows  - 1 x daily - 7 x weekly - 2-3 sets - 10 reps - 5 hold    Assessment: Tolerated treatment well. Updated HEP as above.  Pt will benefit from continued skilled outpatient PT to improve strength, to address therapy goals, to reduce pain, and improve function.        Plan: Continue per plan of care.  Progress treatment as tolerated.       Precautions: Hx of Vertigo, Anxiety, HTN      Manuals                                                                Neuro Re-Ed             Prone I's             Prone T's             Prone Y's             Wall SA press  5\" 1x10                                                  Ther Ex             UBE             Sup wand flex HEP            TB ER HEP            TB IR HEP            TB shld ext HEP            TB rows HEP            Shoulder iso - " "flex, ext, ER, IR  5\"x10 ea                        Ther Activity                                       Gait Training                                       Modalities                                            "

## 2024-12-16 ENCOUNTER — OFFICE VISIT (OUTPATIENT)
Dept: FAMILY MEDICINE CLINIC | Facility: CLINIC | Age: 64
End: 2024-12-16
Payer: COMMERCIAL

## 2024-12-16 VITALS
TEMPERATURE: 97.5 F | HEIGHT: 65 IN | SYSTOLIC BLOOD PRESSURE: 128 MMHG | BODY MASS INDEX: 22.99 KG/M2 | RESPIRATION RATE: 15 BRPM | WEIGHT: 138 LBS | HEART RATE: 83 BPM | DIASTOLIC BLOOD PRESSURE: 84 MMHG | OXYGEN SATURATION: 99 %

## 2024-12-16 DIAGNOSIS — Z00.00 ANNUAL PHYSICAL EXAM: Primary | ICD-10-CM

## 2024-12-16 DIAGNOSIS — D72.819 LEUKOPENIA, UNSPECIFIED TYPE: ICD-10-CM

## 2024-12-16 DIAGNOSIS — R09.81 SINUS CONGESTION: ICD-10-CM

## 2024-12-16 DIAGNOSIS — I10 ESSENTIAL HYPERTENSION: ICD-10-CM

## 2024-12-16 DIAGNOSIS — K21.9 GASTROESOPHAGEAL REFLUX DISEASE, UNSPECIFIED WHETHER ESOPHAGITIS PRESENT: ICD-10-CM

## 2024-12-16 DIAGNOSIS — Z23 ENCOUNTER FOR IMMUNIZATION: ICD-10-CM

## 2024-12-16 PROCEDURE — 99397 PER PM REEVAL EST PAT 65+ YR: CPT | Performed by: FAMILY MEDICINE

## 2024-12-16 RX ORDER — PREDNISONE 10 MG/1
TABLET ORAL
Qty: 20 TABLET | Refills: 0 | Status: SHIPPED | OUTPATIENT
Start: 2024-12-16

## 2024-12-16 NOTE — PATIENT INSTRUCTIONS
"Patient Education     Routine physical for adults   The Basics   Written by the doctors and editors at [facility]   What is a physical? -- A physical is a routine visit, or \"check-up,\" with your doctor. You might also hear it called a \"wellness visit\" or \"preventive visit.\"  During each visit, the doctor will:   Ask about your physical and mental health   Ask about your habits, behaviors, and lifestyle   Do an exam   Give you vaccines if needed   Talk to you about any medicines you take   Give advice about your health   Answer your questions  Getting regular check-ups is an important part of taking care of your health. It can help your doctor find and treat any problems you have. But it's also important for preventing health problems.  A routine physical is different from a \"sick visit.\" A sick visit is when you see a doctor because of a health concern or problem. Since physicals are scheduled ahead of time, you can think about what you want to ask the doctor.  How often should I get a physical? -- It depends on your age and health. In general, for people age 21 years and older:   If you are younger than 50 years, you might be able to get a physical every 3 years.   If you are 50 years or older, your doctor might recommend a physical every year.  If you have an ongoing health condition, like diabetes or high blood pressure, your doctor will probably want to see you more often.  What happens during a physical? -- In general, each visit will include:   Physical exam - The doctor or nurse will check your height, weight, heart rate, and blood pressure. They will also look at your eyes and ears. They will ask about how you are feeling and whether you have any symptoms that bother you.   Medicines - It's a good idea to bring a list of all the medicines you take to each doctor visit. Your doctor will talk to you about your medicines and answer any questions. Tell them if you are having any side effects that bother you. You " "should also tell them if you are having trouble paying for any of your medicines.   Habits and behaviors - This includes:   Your diet   Your exercise habits   Whether you smoke, drink alcohol, or use drugs   Whether you are sexually active   Whether you feel safe at home  Your doctor will talk to you about things you can do to improve your health and lower your risk of health problems. They will also offer help and support. For example, if you want to quit smoking, they can give you advice and might prescribe medicines. If you want to improve your diet or get more physical activity, they can help you with this, too.   Lab tests, if needed - The tests you get will depend on your age and situation. For example, your doctor might want to check your:   Cholesterol   Blood sugar   Iron level   Vaccines - The recommended vaccines will depend on your age, health, and what vaccines you already had. Vaccines are very important because they can prevent certain serious or deadly infections.   Discussion of screening - \"Screening\" means checking for diseases or other health problems before they cause symptoms. Your doctor can recommend screening based on your age, risk, and preferences. This might include tests to check for:   Cancer, such as breast, prostate, cervical, ovarian, colorectal, prostate, lung, or skin cancer   Sexually transmitted infections, such as chlamydia and gonorrhea   Mental health conditions like depression and anxiety  Your doctor will talk to you about the different types of screening tests. They can help you decide which screenings to have. They can also explain what the results might mean.   Answering questions - The physical is a good time to ask the doctor or nurse questions about your health. If needed, they can refer you to other doctors or specialists, too.  Adults older than 65 years often need other care, too. As you get older, your doctor will talk to you about:   How to prevent falling at " home   Hearing or vision tests   Memory testing   How to take your medicines safely   Making sure that you have the help and support you need at home  All topics are updated as new evidence becomes available and our peer review process is complete.  This topic retrieved from [employer] on: May 02, 2024.  Topic 182625 Version 1.0  Release: 32.4.3 - C32.122  © 2024 UpToDate, Inc. and/or its affiliates. All rights reserved.  Consumer Information Use and Disclaimer   Disclaimer: This generalized information is a limited summary of diagnosis, treatment, and/or medication information. It is not meant to be comprehensive and should be used as a tool to help the user understand and/or assess potential diagnostic and treatment options. It does NOT include all information about conditions, treatments, medications, side effects, or risks that may apply to a specific patient. It is not intended to be medical advice or a substitute for the medical advice, diagnosis, or treatment of a health care provider based on the health care provider's examination and assessment of a patient's specific and unique circumstances. Patients must speak with a health care provider for complete information about their health, medical questions, and treatment options, including any risks or benefits regarding use of medications. This information does not endorse any treatments or medications as safe, effective, or approved for treating a specific patient. UpToDate, Inc. and its affiliates disclaim any warranty or liability relating to this information or the use thereof.The use of this information is governed by the Terms of Use, available at https://www.woltersTravelTriangleuwer.com/en/know/clinical-effectiveness-terms. 2024© UpToDate, Inc. and its affiliates and/or licensors. All rights reserved.  Copyright   © 2024 UpToDate, Inc. and/or its affiliates. All rights reserved.

## 2024-12-16 NOTE — PROGRESS NOTES
Adult Annual Physical  Name: Pat Gerber      : 1960      MRN: [id number]  Encounter Provider: Marleen Mcleod MD  Encounter Date: 2024   Encounter department: [facility]    Assessment & Plan    Annual physical exam     Essential hypertension  - well controlled on HCTZ    Sinus congestion  - 8 day prednisone taper prescribed, side effects reviewed, continue Flonase and saline nasal spray, encouraged to contact the office for persistent ro worsening symptoms    Gastroesophageal reflux disease  - stable on Aciphex     Leukopenia  - follow up with Hem-Onc      Return in 6 months or sooner as needed.    Immunizations and preventive care screenings were discussed with patient today. Appropriate education was printed on patient's after visit summary.    Counseling:  Alcohol/drug use: discussed moderation in alcohol intake, the recommendations for healthy alcohol use, and avoidance of illicit drug use.  Dental Health: discussed importance of regular tooth brushing, flossing, and dental visits.  Injury prevention: discussed safety/seat belts, safety helmets, smoke detectors, carbon monoxide detectors, and smoking near bedding or upholstery.  Exercise: the importance of regular exercise/physical activity was discussed. Recommend exercise 3-5 times per week for at least 30 minutes.          History of Present Illness     Patent presents today for annual physical and follow up for hypertension. Patient reports increased nasal congestion, postnasal drip and sinus pressure for over a week, no fever or chills, no purulent mucus production, no chest pain or shortness of breath. She has been using Flonase nasal spray without significant relief.     Adult Annual Physical  Review of Systems   Constitutional:  Negative for appetite change, diaphoresis, fatigue and fever.   HENT:  Positive for congestion, postnasal drip, sinus pressure and sinus pain. Negative for ear  "pain and sore throat.    Eyes:  Negative for pain, discharge, redness, itching and visual disturbance.   Respiratory:  Negative for cough, shortness of breath and wheezing.    Cardiovascular:  Negative for chest pain, palpitations and leg swelling.   Gastrointestinal:  Negative for abdominal pain, blood in stool, diarrhea, nausea and vomiting.   Endocrine: Negative for cold intolerance, heat intolerance, polydipsia and polyuria.   Genitourinary:  Negative for dysuria, frequency, hematuria, pelvic pain and urgency.   Musculoskeletal:  Positive for arthralgias.   Neurological:  Negative for dizziness, syncope, weakness, numbness and headaches.   Hematological:  Negative for adenopathy.   Psychiatric/Behavioral:  Negative for dysphoric mood and sleep disturbance. The patient is not nervous/anxious.          Objective   /84   Pulse 83   Temp 97.5 °F (36.4 °C)   Resp 15   Ht 5' 5\" (1.651 m)   Wt 62.6 kg (138 lb)   LMP  (LMP Unknown)   SpO2 99%   BMI 22.96 kg/m²     Physical Exam  Constitutional:       General: She is not in acute distress.     Appearance: Normal appearance. She is well-developed.   HENT:      Right Ear: Tympanic membrane, ear canal and external ear normal.      Left Ear: Tympanic membrane, ear canal and external ear normal.      Nose: Congestion present.      Mouth/Throat:      Mouth: Mucous membranes are moist.      Pharynx: Oropharynx is clear.   Eyes:      General: No scleral icterus.     Extraocular Movements: Extraocular movements intact.      Conjunctiva/sclera: Conjunctivae normal.      Pupils: Pupils are equal, round, and reactive to light.   Neck:      Thyroid: No thyromegaly.      Vascular: No JVD.   Cardiovascular:      Rate and Rhythm: Normal rate and regular rhythm.      Heart sounds: Normal heart sounds. No murmur heard.  Pulmonary:      Effort: Pulmonary effort is normal. No respiratory distress.      Breath sounds: Normal breath sounds. No wheezing, rhonchi or rales. "   Abdominal:      General: There is no distension.      Palpations: Abdomen is soft. There is no mass.      Tenderness: There is no abdominal tenderness.   Musculoskeletal:      Cervical back: Neck supple.      Right lower leg: No edema.      Left lower leg: No edema.   Lymphadenopathy:      Cervical: No cervical adenopathy.   Skin:     Findings: No rash.   Neurological:      General: No focal deficit present.      Mental Status: She is alert and oriented to person, place, and time.      Cranial Nerves: No cranial nerve deficit.   Psychiatric:         Mood and Affect: Mood normal.         Behavior: Behavior normal.         Thought Content: Thought content normal.         Judgment: Judgment normal.       Lab Results   Component Value Date    WBC 3.2 (L) 08/03/2024    HGB 13.6 08/03/2024    HCT 39.2 08/03/2024    .0 (H) 08/03/2024     08/03/2024     Lab Results   Component Value Date     07/01/2017    SODIUM 139 08/03/2024    K 4.0 08/03/2024     08/03/2024    CO2 31 08/03/2024    AGAP 6 05/20/2023    BUN 11 08/03/2024    CREATININE 0.71 08/03/2024    GLUC 95 08/03/2024    GLUF 98 05/20/2023    CALCIUM 9.5 08/03/2024    AST 21 08/03/2024    ALT 14 08/03/2024    ALKPHOS 82 08/03/2024    PROT 6.6 07/01/2017    TP 6.6 08/03/2024    BILITOT 0.6 07/01/2017    TBILI 0.7 08/03/2024    EGFR 94 08/03/2024     Lab Results   Component Value Date    DCU5JFWLTVEI 1.719 11/14/2017     Lab Results   Component Value Date    CHOLESTEROL 185 08/03/2024    CHOLESTEROL 223 (H) 04/07/2023    CHOLESTEROL 197 04/24/2021     Lab Results   Component Value Date     08/03/2024     04/07/2023    HDL 97 04/24/2021     Lab Results   Component Value Date    TRIG 86 08/03/2024    TRIG 66 04/07/2023    TRIG 70 04/24/2021     Lab Results   Component Value Date    LDLCALC 61 08/03/2024

## 2024-12-27 ENCOUNTER — IMMUNIZATIONS (OUTPATIENT)
Dept: FAMILY MEDICINE CLINIC | Facility: CLINIC | Age: 64
End: 2024-12-27
Payer: COMMERCIAL

## 2024-12-27 DIAGNOSIS — Z23 NEED FOR VACCINATION: Primary | ICD-10-CM

## 2024-12-27 DIAGNOSIS — Z23 ENCOUNTER FOR IMMUNIZATION: ICD-10-CM

## 2024-12-27 PROCEDURE — 90662 IIV NO PRSV INCREASED AG IM: CPT

## 2024-12-27 PROCEDURE — 90471 IMMUNIZATION ADMIN: CPT

## 2025-01-06 ENCOUNTER — OFFICE VISIT (OUTPATIENT)
Dept: OBGYN CLINIC | Facility: CLINIC | Age: 65
End: 2025-01-06
Payer: COMMERCIAL

## 2025-01-06 VITALS — WEIGHT: 138 LBS | BODY MASS INDEX: 22.18 KG/M2 | HEIGHT: 66 IN

## 2025-01-06 DIAGNOSIS — M25.512 CHRONIC LEFT SHOULDER PAIN: Primary | ICD-10-CM

## 2025-01-06 DIAGNOSIS — G89.29 CHRONIC LEFT SHOULDER PAIN: Primary | ICD-10-CM

## 2025-01-06 PROCEDURE — 99213 OFFICE O/P EST LOW 20 MIN: CPT | Performed by: PHYSICAL MEDICINE & REHABILITATION

## 2025-01-06 NOTE — PROGRESS NOTES
1. Chronic left shoulder pain          No orders of the defined types were placed in this encounter.       Impression:  Patient is here in follow up of left shoulder pain likely secondary to rotator cuff arthropathy and bicep tendinitis.  Treatment has included subacromial space steroid injection and physical therapy with home exercise program.  Patient was doing well up until a pulling episode a few weeks ago.  I will have her return for a subacromial space steroid injection.  If no improvement after that, would consider an MRI of her left shoulder.    Imaging Studies (I personally reviewed images in PACS and report):  Left shoulder x-rays most recent to this encounter reviewed.  These images show possible chronic avulsion fracture just inferior to the distal end of the clavicle.  No significant osteoarthritic changes.    No follow-ups on file.    Patient is in agreement with the above plan.    HPI:  Pat Gerber is a 64 y.o. female  who presents in follow up.  Here for   Chief Complaint   Patient presents with    Follow-up     Follow up of the left shoulder. Everything going well. 2 and half weeks ago she thinks she pulled a muscle in her garden.        Since last visit: See above.  She hurt her shoulder 2.5 weeks ago when pulling up a flower.  It is along the shoulder muscle.     Following history reviewed and updated:  Past Medical History:   Diagnosis Date    Abnormal findings on diagnostic imaging of breast     Last assessed 08/16/13    Adjustment disorder with depressed mood     Last assessed 05/17/13    Anxiety     Arthritis     hands    Erythema migrans (Lyme disease)     Last assessed 06/29/13    GERD (gastroesophageal reflux disease)     Polyarthritis     Last assessed 02/17/16    Uterine leiomyoma     Vertigo      Past Surgical History:   Procedure Laterality Date    BREAST BIOPSY Left 02/2002    BREAST SURGERY      CARPAL TUNNEL RELEASE Bilateral 10/2008    DENTAL SURGERY      DILATION AND  CURETTAGE OF UTERUS      HYSTEROSCOPY      w/D and C on 06 for irregular menses.  Her pathology demonstrated proliferative endometrium    INDUCED       MOHS RECONSTRUCTION Right 2019    Procedure: RECONSTRUCTION MOHS DEFECT RIGHT NOSE;  Surgeon: Kamlesh Damon MD;  Location: [facility];  Service: Plastics    MOHS RECONSTRUCTION Left 2023    Procedure: RECONSTRUCTION MOHS DEFECT OF LEFT BROW/FOREHEAD WITH LOCAL FLAP;  Surgeon: Kamlesh Damon MD;  [facility];  Service: Plastics    IN ADJT TIS REARGMT EYE/NOSE/EAR/LIP 10.1-30.0 SQCM Right 2019    Procedure: FLAP LOCAL RIGHT NOSE VS FTSG;  Surgeon: Kamlesh Damon MD;  [facility]: [facility];  Service: Plastics    SKIN BIOPSY      TONSILLECTOMY      WRIST SURGERY Right      Social History   Social History     Substance and Sexual Activity   Alcohol Use Yes    Alcohol/week: 3.0 standard drinks of alcohol    Types: 3 Glasses of wine per week    Comment: social- 2-3 glasses of wine     Social History     Substance and Sexual Activity   Drug Use No     Social History     Tobacco Use   Smoking Status Never   Smokeless Tobacco Never     Family History   Problem Relation Age of Onset    Arthritis Mother     Hypertension Mother     Coronary artery disease Mother     Skin cancer Mother     Varicose Veins Mother     Uterine cancer Mother     Arthritis Father     Hypertension Father     Stroke Father     Coronary artery disease Father     Other Father         Stent indications    Skin cancer Sister     Ulcerative colitis Sister     Rheum arthritis Maternal Grandmother     No Known Problems Maternal Grandfather     No Known Problems Paternal Grandmother     Heart disease Paternal Grandfather     No Known Problems Brother     Heart disease Maternal Aunt     Heart disease Maternal Aunt     Cancer Maternal Uncle     Substance Abuse Neg Hx     Mental illness Neg Hx      Allergies   Allergen Reactions    Other Allergic Rhinitis  "    Seasonal          Constitutional:  Ht 5' 5.5\" (1.664 m)   Wt 62.6 kg (138 lb)   LMP  (LMP Unknown)   BMI 22.62 kg/m²    General: NAD.  Eyes: Clear sclerae.  ENT: No inflammation, lesion, or mass of lips.  No tracheal deviation.  Musculoskeletal: As mentioned below.  Integumentary: No visible rashes or skin lesions.  Pulmonary/Chest: Effort normal. No respiratory distress.   Neuro: CN's grossly intact, BETHEA.  Psych: Normal affect and judgement.  Vascular: WWP.    Left Shoulder Exam     Tenderness   The patient is experiencing tenderness in the acromion and biceps tendon.    Range of Motion   Active abduction:  100 abnormal     Tests   Gerber test: positive  Impingement: positive    Other   Erythema: absent  Scars: absent  Sensation: normal  Pulse: present              Procedures  "

## 2025-02-08 ENCOUNTER — OFFICE VISIT (OUTPATIENT)
Dept: OBGYN CLINIC | Facility: CLINIC | Age: 65
End: 2025-02-08
Payer: COMMERCIAL

## 2025-02-08 VITALS — HEIGHT: 66 IN | WEIGHT: 138 LBS | BODY MASS INDEX: 22.18 KG/M2

## 2025-02-08 DIAGNOSIS — G89.29 CHRONIC LEFT SHOULDER PAIN: Primary | ICD-10-CM

## 2025-02-08 DIAGNOSIS — M25.512 CHRONIC LEFT SHOULDER PAIN: Primary | ICD-10-CM

## 2025-02-08 PROCEDURE — 99213 OFFICE O/P EST LOW 20 MIN: CPT | Performed by: PHYSICAL MEDICINE & REHABILITATION

## 2025-02-08 PROCEDURE — 20610 DRAIN/INJ JOINT/BURSA W/O US: CPT | Performed by: PHYSICAL MEDICINE & REHABILITATION

## 2025-02-08 RX ORDER — TRIAMCINOLONE ACETONIDE 40 MG/ML
80 INJECTION, SUSPENSION INTRA-ARTICULAR; INTRAMUSCULAR
Status: COMPLETED | OUTPATIENT
Start: 2025-02-08 | End: 2025-02-08

## 2025-02-08 RX ORDER — ROPIVACAINE HYDROCHLORIDE 5 MG/ML
10 INJECTION, SOLUTION EPIDURAL; INFILTRATION; PERINEURAL
Status: COMPLETED | OUTPATIENT
Start: 2025-02-08 | End: 2025-02-08

## 2025-02-08 RX ADMIN — ROPIVACAINE HYDROCHLORIDE 10 ML: 5 INJECTION, SOLUTION EPIDURAL; INFILTRATION; PERINEURAL at 10:15

## 2025-02-08 RX ADMIN — TRIAMCINOLONE ACETONIDE 80 MG: 40 INJECTION, SUSPENSION INTRA-ARTICULAR; INTRAMUSCULAR at 10:15

## 2025-02-08 NOTE — PROGRESS NOTES
1. Chronic left shoulder pain  Large joint arthrocentesis: L subacromial bursa        Orders Placed This Encounter   Procedures    Large joint arthrocentesis: L subacromial bursa        Impression:  Patient is here in follow up of left shoulder pain likely secondary to rotator cuff arthropathy and bicep tendinitis.  Treatment has included subacromial space steroid injection and physical therapy with home exercise program.  Patient was doing well up until a pulling episode a few weeks ago.  Repeated a subacromial space steroid injection.  I will see her back in 4-5 weeks if needed.  If still symptomatic, could consider advanced imaging.  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     Imaging Studies (I personally reviewed images in PACS and report):  Left shoulder x-rays most recent to this encounter reviewed.  These images show possible chronic avulsion fracture just inferior to the distal end of the clavicle.  No significant osteoarthritic changes.    No follow-ups on file.    Patient is in agreement with the above plan.    HPI:  Pat Gerber is a 65 y.o. female  who presents in follow up.  Here for   Chief Complaint   Patient presents with    Left Shoulder - Follow-up, Pain       Since last visit: See above.    Following history reviewed and updated:  Past Medical History:   Diagnosis Date    Abnormal findings on diagnostic imaging of breast     Last assessed 08/16/13    Adjustment disorder with depressed mood     Last assessed 05/17/13    Anxiety     Arthritis     hands    Erythema migrans (Lyme disease)     Last assessed 06/29/13    GERD (gastroesophageal reflux disease)     Polyarthritis     Last assessed 02/17/16    Uterine leiomyoma     Vertigo      Past Surgical History:   Procedure Laterality Date    BREAST BIOPSY Left 02/2002    BREAST SURGERY      CARPAL TUNNEL RELEASE Bilateral 10/2008    DENTAL SURGERY      DILATION AND CURETTAGE OF UTERUS      HYSTEROSCOPY      w/D and C on  "06 for irregular menses.  Her pathology demonstrated proliferative endometrium    INDUCED       MOHS RECONSTRUCTION Right 2019    Procedure: RECONSTRUCTION MOHS DEFECT RIGHT NOSE;  Surgeon: Kamlesh Damon MD;  [facility];  Service: Plastics    MOHS RECONSTRUCTION Left 2023    Procedure: RECONSTRUCTION MOHS DEFECT OF LEFT BROW/FOREHEAD WITH LOCAL FLAP;  Surgeon: Kamlesh Damon MD;  [facility];  Service: Plastics    WA ADJT TIS REARGMT EYE/NOSE/EAR/LIP 10.1-30.0 SQCM Right 2019    Procedure: FLAP LOCAL RIGHT NOSE VS FTSG;  Surgeon: Kamlesh Damon MD;  [facility];  Service: Plastics    SKIN BIOPSY      TONSILLECTOMY      WRIST SURGERY Right      Social History   Social History     Substance and Sexual Activity   Alcohol Use Yes    Alcohol/week: 3.0 standard drinks of alcohol    Types: 3 Glasses of wine per week    Comment: social- 2-3 glasses of wine     Social History     Substance and Sexual Activity   Drug Use No     Social History     Tobacco Use   Smoking Status Never   Smokeless Tobacco Never     Family History   Problem Relation Age of Onset    Arthritis Mother     Hypertension Mother     Coronary artery disease Mother     Skin cancer Mother     Varicose Veins Mother     Uterine cancer Mother     Arthritis Father     Hypertension Father     Stroke Father     Coronary artery disease Father     Other Father         Stent indications    Skin cancer Sister     Ulcerative colitis Sister     Rheum arthritis Maternal Grandmother     No Known Problems Maternal Grandfather     No Known Problems Paternal Grandmother     Heart disease Paternal Grandfather     No Known Problems Brother     Heart disease Maternal Aunt     Heart disease Maternal Aunt     Cancer Maternal Uncle     Substance Abuse Neg Hx     Mental illness Neg Hx      Allergies   Allergen Reactions    Other Allergic Rhinitis     Seasonal          Constitutional:  Ht 5' 5.51\" (1.664 " m)   Wt 62.6 kg (138 lb)   LMP  (LMP Unknown)   BMI 22.61 kg/m²    General: NAD.  Eyes: Clear sclerae.  ENT: No inflammation, lesion, or mass of lips.  No tracheal deviation.  Musculoskeletal: As mentioned below.  Integumentary: No visible rashes or skin lesions.  Pulmonary/Chest: Effort normal. No respiratory distress.   Neuro: CN's grossly intact, BETHEA.  Psych: Normal affect and judgement.  Vascular: WWP.    Left Shoulder Exam     Tenderness   The patient is experiencing tenderness in the acromion.    Tests   Gerber test: positive  Impingement: positive    Other   Erythema: absent  Scars: absent  Sensation: normal  Pulse: present              Large joint arthrocentesis: L subacromial bursa  [place] Protocol:  procedure performed by consultantConsent: Verbal consent obtained. Written consent not obtained.  Risks and benefits: risks, benefits and alternatives were discussed  Consent given by: patient  Timeout called at: 2/8/2025 10:20 AM.  Patient understanding: patient states understanding of the procedure being performed  Site marked: the operative site was marked  Patient identity confirmed: verbally with patient  Supporting Documentation  Indications: pain   Procedure Details  Location: shoulder - L subacromial bursa  Needle gauge: 21G 2''  Ultrasound guidance: no  Approach: posterolateral  Medications administered: 80 mg triamcinolone acetonide 40 mg/mL; 10 mL ropivacaine 0.5 %    Patient tolerance: patient tolerated the procedure well with no immediate complications  Dressing:  Sterile dressing applied    There was little to no resistance encountered during the injection.    Risks of this procedure include:    - Risk of bleeding since a needle is involved.  - Risk of infection (1/10,000 chance as per recent studies).  Signs/symptoms were discussed and they would prompt an urgent evaluation at an emergency department.  - Risk of pigmentation or skin dimpling in the skin (2-3% chance as per recent studies)  from the steroid.  - Risk of increased pain from steroid flare (1% chance as per recent studies) that typically lasts 24-48 hours.  - Risk of increased blood sugars from the steroid medication that can last for a few weeks.  If the patient is a diabetic or pre-diabetic, they were encouraged to closely monitor their blood sugars and discuss with PCP if elevated more than usual or if having symptoms.    The benefits outweigh the risks and so the procedure was completed.

## 2025-03-18 ENCOUNTER — TELEPHONE (OUTPATIENT)
Age: 65
End: 2025-03-18

## 2025-03-18 NOTE — TELEPHONE ENCOUNTER
Patient has an appt on Saturday for labs from [facility] at [facility] in [place]. Faxed over orders to [phone] on patient request via [facility].

## 2025-03-25 LAB
ALBUMIN SERPL ELPH-MCNC: 4.4 G/DL (ref 3.8–4.8)
ALPHA1 GLOB SERPL ELPH-MCNC: 0.3 G/DL (ref 0.2–0.3)
ALPHA2 GLOB SERPL ELPH-MCNC: 0.6 G/DL (ref 0.5–0.9)
BASOPHILS # BLD AUTO: 60 CELLS/UL (ref 0–200)
BASOPHILS NFR BLD AUTO: 1.4 %
BETA1 GLOB SERPL ELPH-MCNC: 0.5 G/DL (ref 0.4–0.6)
BETA2 GLOB SERPL ELPH-MCNC: 0.4 G/DL (ref 0.2–0.5)
EOSINOPHIL # BLD AUTO: 112 CELLS/UL (ref 15–500)
EOSINOPHIL NFR BLD AUTO: 2.6 %
ERYTHROCYTE [DISTWIDTH] IN BLOOD BY AUTOMATED COUNT: 11.1 % (ref 11–15)
FERRITIN SERPL-MCNC: 41 NG/ML (ref 16–288)
FOLATE SERPL-MCNC: 13.5 NG/ML
GAMMA GLOB SERPL ELPH-MCNC: 0.6 G/DL (ref 0.8–1.7)
HCT VFR BLD AUTO: 39.1 % (ref 35–45)
HGB BLD-MCNC: 13.7 G/DL (ref 11.7–15.5)
IGA SERPL-MCNC: 258 MG/DL (ref 70–320)
IGG SERPL-MCNC: 648 MG/DL (ref 600–1540)
IGM SERPL-MCNC: 25 MG/DL (ref 50–300)
IRON SATN MFR SERPL: 23 % (CALC) (ref 16–45)
IRON SERPL-MCNC: 95 MCG/DL (ref 45–160)
KAPPA LC FREE SER-MCNC: 12 MG/L (ref 3.3–19.4)
LYMPHOCYTES # BLD AUTO: 1157 CELLS/UL (ref 850–3900)
LYMPHOCYTES NFR BLD AUTO: 26.9 %
MCH RBC QN AUTO: 35.8 PG (ref 27–33)
MCHC RBC AUTO-ENTMCNC: 35 G/DL (ref 32–36)
MCV RBC AUTO: 102.1 FL (ref 80–100)
MONOCYTES # BLD AUTO: 473 CELLS/UL (ref 200–950)
MONOCYTES NFR BLD AUTO: 11 %
NEUTROPHILS # BLD AUTO: 2498 CELLS/UL (ref 1500–7800)
NEUTROPHILS NFR BLD AUTO: 58.1 %
PLATELET # BLD AUTO: 293 THOUSAND/UL (ref 140–400)
PMV BLD REES-ECKER: 9.2 FL (ref 7.5–12.5)
PROT SERPL-MCNC: 6.6 G/DL (ref 6.1–8.1)
RBC # BLD AUTO: 3.83 MILLION/UL (ref 3.8–5.1)
TIBC SERPL-MCNC: 406 MCG/DL (CALC) (ref 250–450)
WBC # BLD AUTO: 4.3 THOUSAND/UL (ref 3.8–10.8)

## 2025-04-09 ENCOUNTER — OFFICE VISIT (OUTPATIENT)
Dept: HEMATOLOGY ONCOLOGY | Facility: CLINIC | Age: 65
End: 2025-04-09
Payer: COMMERCIAL

## 2025-04-09 VITALS
HEART RATE: 84 BPM | SYSTOLIC BLOOD PRESSURE: 156 MMHG | HEIGHT: 66 IN | RESPIRATION RATE: 16 BRPM | TEMPERATURE: 97.9 F | WEIGHT: 136.8 LBS | OXYGEN SATURATION: 98 % | BODY MASS INDEX: 21.98 KG/M2 | DIASTOLIC BLOOD PRESSURE: 90 MMHG

## 2025-04-09 DIAGNOSIS — D80.1 HYPOGAMMAGLOBULINEMIA (HCC): ICD-10-CM

## 2025-04-09 DIAGNOSIS — D75.89 MACROCYTOSIS: ICD-10-CM

## 2025-04-09 DIAGNOSIS — D72.819 LEUKOPENIA, UNSPECIFIED TYPE: Primary | ICD-10-CM

## 2025-04-09 PROCEDURE — 99214 OFFICE O/P EST MOD 30 MIN: CPT | Performed by: NURSE PRACTITIONER

## 2025-04-09 NOTE — LETTER
2025     Marleen Mcleod MD  [address]  [address]  [facility] [address]    Patient: Pat Gerber   YOB: 1960   Date of Visit: 2025       Dear MD Eleuterio Mcgregor Jr., MD:    Thank you for referring Pat Gerber to me for evaluation. Below are my notes for this consultation.    If you have questions, please do not hesitate to call me. I look forward to following your patient along with you.         Sincerely,        JUDI Ross        CC: MD Bhavana Mills Jr., CRNP  2025  5:31 PM  Sign when Signing Visit  Name: Pat Gerber      : 1960      MRN: [id number]  Encounter Provider: JUDI Ross  Encounter Date: 2025   Encounter department: [facility] [place]  :  Assessment & Plan  Leukopenia, unspecified type   WBCs currently normal I suspect leukopenia is intermittent.  Patient denies fevers, chills, night sweats, abnormal weight loss.  She does have a history of seronegative RA, OA, and osteoporosis follows with rheumatology.  She also follows with dermatology for management of basal cell cancer.  Patient is up-to-date on mammogram and colonoscopy.  We discussed continuing to monitor, patient is willing to follow up in 1 year.  If all is stable likely discharge back to PCP.    Orders:  •  CBC and differential; Standing  •  Immunoglobulin free LT chains blood; Standing  •  IgG, IgA, IgM; Standing  •  Protein electrophoresis, serum; Standing    Hypogammaglobulinemia (HCC)    SPEP shows a persistent hypogammaglobulinemia, IgM has been low.  We discussed etiology of IgM deficiency.  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  Patients may be asymptomatic however approximately 80% of patients present with frequent  infections including bacterial, viral, fungi and protozoa.  There may be an increased frequency of allergic and autoimmune diseases such as rheumatoid arthritis, celiac, Crohn's, Sjogren syndrome etc.  Per up-to-date there is no suggested therapy for replacement of IgM other than treating underlying infections.   Macrocytosis   See above.    No follow-ups on file.    History of Present Illness  Chief Complaint   Patient presents with   • Follow-up     Patient being seen for f/u. Last labs 3/22/2025.     4/9/2025: Most recent labs from 3/22/2025 show a normal WBC 4.3, RBC hemoglobin hematocrit, .1, MCH 35.8 otherwise normal CBC and differential.  Folate 13.5, iron saturation 23% with a ferritin level of 41.  SPEP consistent with hypogammaglobulinemia.  IgA and IgG within normal limits, IgM has been persistently low since at least January 2024.    Pertinent Medical History  10/6/2024: Patient is a 64-year-old female with a history of hypertension, rheumatoid arthritis, GERD, osteoporosis, arthritis, anxiety, and leukopenia.  WBC has intermittently been decreased ranging between 3.2-4.5 since 2016. Macrocytosis is also present with an MCV of 100.8-101.5.  Patient has had workup including inflammatory markers, CRP, Sjogren's antibody, ESTEBAN, cyclic citral peptide antibody, and vitamin deficiencies that have been fairly stable. Patient does not report frequent infections, weight loss, night sweats that drenched sheets, or fevers. She is up-to-date on mammogram and colonoscopy. SPEP was consistent with hypogammaglobulinemia.      Review of Systems   Constitutional:  Negative for activity change, appetite change, fatigue, fever and unexpected weight change.   Respiratory:  Negative for cough and shortness of breath.    Cardiovascular:  Negative for chest pain and leg swelling.   Gastrointestinal:  Negative for abdominal pain, constipation, diarrhea and nausea.   Endocrine: Negative for cold intolerance and heat  "intolerance.   Musculoskeletal:  Negative for arthralgias and myalgias.   Skin: Negative.    Neurological:  Negative for dizziness, weakness and headaches.   Hematological:  Negative for adenopathy. Does not bruise/bleed easily.         Objective  /90 (BP Location: Left arm, Patient Position: Sitting, Cuff Size: Standard)   Pulse 84   Temp 97.9 °F (36.6 °C) (Temporal)   Resp 16   Ht 5' 5.51\" (1.664 m)   Wt 62.1 kg (136 lb 12.8 oz)   LMP  (LMP Unknown)   SpO2 98%   BMI 22.41 kg/m²     Physical Exam  Vitals reviewed.   Constitutional:       Appearance: Normal appearance. She is well-developed.   HENT:      Head: Normocephalic and atraumatic.   Eyes:      Conjunctiva/sclera: Conjunctivae normal.      Pupils: Pupils are equal, round, and reactive to light.   Pulmonary:      Effort: Pulmonary effort is normal. No respiratory distress.   Musculoskeletal:         General: Normal range of motion.      Cervical back: Normal range of motion.   Lymphadenopathy:      Cervical: No cervical adenopathy.   Skin:     General: Skin is dry.   Neurological:      Mental Status: She is alert and oriented to person, place, and time.   Psychiatric:         Behavior: Behavior normal.         Labs: I have reviewed the following labs:  Results for orders placed or performed in visit on 10/02/24   CBC and differential   Result Value Ref Range    White Blood Cell Count 4.3 3.8 - 10.8 Thousand/uL    Red Blood Cell Count 3.83 3.80 - 5.10 Million/uL    Hemoglobin 13.7 11.7 - 15.5 g/dL    HCT 39.1 35.0 - 45.0 %    .1 (H) 80.0 - 100.0 fL    MCH 35.8 (H) 27.0 - 33.0 pg    MCHC 35.0 32.0 - 36.0 g/dL    RDW 11.1 11.0 - 15.0 %    Platelet Count 293 140 - 400 Thousand/uL    SL AMB MPV 9.2 7.5 - 12.5 fL    Neutrophils (Absolute) 2,498 1,500 - 7,800 cells/uL    Lymphocytes (Absolute) 1,157 850 - 3,900 cells/uL    Monocytes (Absolute) 473 200 - 950 cells/uL    Eosinophils (Absolute) 112 15 - 500 cells/uL    Basophils ABS 60 0 - 200 " cells/uL    Neutrophils 58.1 %    Lymphocytes 26.9 %    Monocytes 11.0 %    Eosinophils 2.6 %    Basophils PCT 1.4 %   Immunoglobulin free LT chains blood   Result Value Ref Range    Ig Kappa Free Light Chain 12.0 3.3 - 19.4 mg/L   Result Value Ref Range    FOLATE, SERUM 13.5 ng/mL   Protein electrophoresis, serum   Result Value Ref Range    Protein, Total 6.6 6.1 - 8.1 g/dL    Albumin, Electrophoresis 4.4 3.8 - 4.8 g/dL    Alpha-1 Globulin 0.3 0.2 - 0.3 g/dL    Alpha-2 Globulin 0.6 0.5 - 0.9 g/dL    Beta 1 Globulin 0.5 0.4 - 0.6 g/dL    Beta 2 Globulin 0.4 0.2 - 0.5 g/dL    Gamma Globulin 0.6 (L) 0.8 - 1.7 g/dL    Interpretation     Iron, TIBC and Ferritin Panel   Result Value Ref Range    Iron, Serum 95 45 - 160 mcg/dL    Total Iron Binding Capacity (TIBC) 406 250 - 450 mcg/dL (calc)    Iron Saturation 23 16 - 45 % (calc)    Ferritin 41 16 - 288 ng/mL   Result Value Ref Range    IgA 258 70 - 320 mg/dL   IgG   Result Value Ref Range    IgG 648 600 - 1,540 mg/dL   Result Value Ref Range    IGM 25 (L) 50 - 300 mg/dL

## 2025-04-09 NOTE — PROGRESS NOTES
Name: Pat Gerber      : 1960      MRN: [id number]  Encounter Provider: JUDI Ross  Encounter Date: 2025   Encounter department: [facility] [place]  :  Assessment & Plan  Leukopenia, unspecified type   WBCs currently normal I suspect leukopenia is intermittent.  Patient denies fevers, chills, night sweats, abnormal weight loss.  She does have a history of seronegative RA, OA, and osteoporosis follows with rheumatology.  She also follows with dermatology for management of basal cell cancer.  Patient is up-to-date on mammogram and colonoscopy.  We discussed continuing to monitor, patient is willing to follow up in 1 year.  If all is stable likely discharge back to PCP.    Orders:    CBC and differential; Standing    Immunoglobulin free LT chains blood; Standing    IgG, IgA, IgM; Standing    Protein electrophoresis, serum; Standing    Hypogammaglobulinemia (HCC)    SPEP shows a persistent hypogammaglobulinemia, IgM has been low.  We discussed etiology of IgM deficiency.  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  Patients may be asymptomatic however approximately 80% of patients present with frequent infections including bacterial, viral, fungi and protozoa.  There may be an increased frequency of allergic and autoimmune diseases such as rheumatoid arthritis, celiac, Crohn's, Sjogren syndrome etc.  Per up-to-date there is no suggested therapy for replacement of IgM other than treating underlying infections.   Macrocytosis   See above.    No follow-ups on file.    History of Present Illness   Chief Complaint   Patient presents with    Follow-up     Patient being seen for f/u. Last labs 3/22/2025.     2025: Most recent labs from 3/22/2025 show a normal WBC 4.3, RBC hemoglobin hematocrit, .1, MCH 35.8 otherwise  "normal CBC and differential.  Folate 13.5, iron saturation 23% with a ferritin level of 41.  SPEP consistent with hypogammaglobulinemia.  IgA and IgG within normal limits, IgM has been persistently low since at least January 2024.    Pertinent Medical History   10/6/2024: Patient is a 64-year-old female with a history of hypertension, rheumatoid arthritis, GERD, osteoporosis, arthritis, anxiety, and leukopenia.  WBC has intermittently been decreased ranging between 3.2-4.5 since 2016. Macrocytosis is also present with an MCV of 100.8-101.5.  Patient has had workup including inflammatory markers, CRP, Sjogren's antibody, ESTEBAN, cyclic citral peptide antibody, and vitamin deficiencies that have been fairly stable. Patient does not report frequent infections, weight loss, night sweats that drenched sheets, or fevers. She is up-to-date on mammogram and colonoscopy. SPEP was consistent with hypogammaglobulinemia.      Review of Systems   Constitutional:  Negative for activity change, appetite change, fatigue, fever and unexpected weight change.   Respiratory:  Negative for cough and shortness of breath.    Cardiovascular:  Negative for chest pain and leg swelling.   Gastrointestinal:  Negative for abdominal pain, constipation, diarrhea and nausea.   Endocrine: Negative for cold intolerance and heat intolerance.   Musculoskeletal:  Negative for arthralgias and myalgias.   Skin: Negative.    Neurological:  Negative for dizziness, weakness and headaches.   Hematological:  Negative for adenopathy. Does not bruise/bleed easily.         Objective   /90 (BP Location: Left arm, Patient Position: Sitting, Cuff Size: Standard)   Pulse 84   Temp 97.9 °F (36.6 °C) (Temporal)   Resp 16   Ht 5' 5.51\" (1.664 m)   Wt 62.1 kg (136 lb 12.8 oz)   LMP  (LMP Unknown)   SpO2 98%   BMI 22.41 kg/m²     Physical Exam  Vitals reviewed.   Constitutional:       Appearance: Normal appearance. She is well-developed.   HENT:      Head: " Normocephalic and atraumatic.   Eyes:      Conjunctiva/sclera: Conjunctivae normal.      Pupils: Pupils are equal, round, and reactive to light.   Pulmonary:      Effort: Pulmonary effort is normal. No respiratory distress.   Musculoskeletal:         General: Normal range of motion.      Cervical back: Normal range of motion.   Lymphadenopathy:      Cervical: No cervical adenopathy.   Skin:     General: Skin is dry.   Neurological:      Mental Status: She is alert and oriented to person, place, and time.   Psychiatric:         Behavior: Behavior normal.         Labs: I have reviewed the following labs:  Results for orders placed or performed in visit on 10/02/24   CBC and differential   Result Value Ref Range    White Blood Cell Count 4.3 3.8 - 10.8 Thousand/uL    Red Blood Cell Count 3.83 3.80 - 5.10 Million/uL    Hemoglobin 13.7 11.7 - 15.5 g/dL    HCT 39.1 35.0 - 45.0 %    .1 (H) 80.0 - 100.0 fL    MCH 35.8 (H) 27.0 - 33.0 pg    MCHC 35.0 32.0 - 36.0 g/dL    RDW 11.1 11.0 - 15.0 %    Platelet Count 293 140 - 400 Thousand/uL    SL AMB MPV 9.2 7.5 - 12.5 fL    Neutrophils (Absolute) 2,498 1,500 - 7,800 cells/uL    Lymphocytes (Absolute) 1,157 850 - 3,900 cells/uL    Monocytes (Absolute) 473 200 - 950 cells/uL    Eosinophils (Absolute) 112 15 - 500 cells/uL    Basophils ABS 60 0 - 200 cells/uL    Neutrophils 58.1 %    Lymphocytes 26.9 %    Monocytes 11.0 %    Eosinophils 2.6 %    Basophils PCT 1.4 %   Immunoglobulin free LT chains blood   Result Value Ref Range    Ig Kappa Free Light Chain 12.0 3.3 - 19.4 mg/L   Result Value Ref Range    FOLATE, SERUM 13.5 ng/mL   Protein electrophoresis, serum   Result Value Ref Range    Protein, Total 6.6 6.1 - 8.1 g/dL    Albumin, Electrophoresis 4.4 3.8 - 4.8 g/dL    Alpha-1 Globulin 0.3 0.2 - 0.3 g/dL    Alpha-2 Globulin 0.6 0.5 - 0.9 g/dL    Beta 1 Globulin 0.5 0.4 - 0.6 g/dL    Beta 2 Globulin 0.4 0.2 - 0.5 g/dL    Gamma Globulin 0.6 (L) 0.8 - 1.7 g/dL     Interpretation     Iron, TIBC and Ferritin Panel   Result Value Ref Range    Iron, Serum 95 45 - 160 mcg/dL    Total Iron Binding Capacity (TIBC) 406 250 - 450 mcg/dL (calc)    Iron Saturation 23 16 - 45 % (calc)    Ferritin 41 16 - 288 ng/mL   Result Value Ref Range    IgA 258 70 - 320 mg/dL   IgG   Result Value Ref Range    IgG 648 600 - 1,540 mg/dL   Result Value Ref Range    IGM 25 (L) 50 - 300 mg/dL

## 2025-06-09 ENCOUNTER — OFFICE VISIT (OUTPATIENT)
Dept: OBGYN CLINIC | Facility: CLINIC | Age: 65
End: 2025-06-09
Payer: COMMERCIAL

## 2025-06-09 VITALS — WEIGHT: 136 LBS | BODY MASS INDEX: 21.86 KG/M2 | HEIGHT: 66 IN

## 2025-06-09 DIAGNOSIS — G89.29 CHRONIC LEFT SHOULDER PAIN: Primary | ICD-10-CM

## 2025-06-09 DIAGNOSIS — M25.512 CHRONIC LEFT SHOULDER PAIN: Primary | ICD-10-CM

## 2025-06-09 PROCEDURE — 20610 DRAIN/INJ JOINT/BURSA W/O US: CPT | Performed by: PHYSICAL MEDICINE & REHABILITATION

## 2025-06-09 PROCEDURE — 99213 OFFICE O/P EST LOW 20 MIN: CPT | Performed by: PHYSICAL MEDICINE & REHABILITATION

## 2025-06-09 RX ORDER — ROPIVACAINE HYDROCHLORIDE 5 MG/ML
10 INJECTION, SOLUTION EPIDURAL; INFILTRATION; PERINEURAL
Status: COMPLETED | OUTPATIENT
Start: 2025-06-09 | End: 2025-06-09

## 2025-06-09 RX ORDER — MINOCYCLINE HYDROCHLORIDE 100 MG/1
1 CAPSULE ORAL DAILY
COMMUNITY
Start: 2025-04-29 | End: 2025-06-18

## 2025-06-09 RX ORDER — LEVALBUTEROL INHALATION SOLUTION 0.31 MG/3ML
SOLUTION RESPIRATORY (INHALATION)
COMMUNITY

## 2025-06-09 RX ORDER — CLINDAMYCIN PHOSPHATE 10 UG/ML
LOTION TOPICAL
COMMUNITY
Start: 2025-04-09

## 2025-06-09 RX ORDER — TRIAMCINOLONE ACETONIDE 40 MG/ML
80 INJECTION, SUSPENSION INTRA-ARTICULAR; INTRAMUSCULAR
Status: COMPLETED | OUTPATIENT
Start: 2025-06-09 | End: 2025-06-09

## 2025-06-09 RX ADMIN — ROPIVACAINE HYDROCHLORIDE 10 ML: 5 INJECTION, SOLUTION EPIDURAL; INFILTRATION; PERINEURAL at 13:30

## 2025-06-09 RX ADMIN — TRIAMCINOLONE ACETONIDE 80 MG: 40 INJECTION, SUSPENSION INTRA-ARTICULAR; INTRAMUSCULAR at 13:30

## 2025-06-09 NOTE — PROGRESS NOTES
Assessment & Plan  Chronic left shoulder pain  Patient is here in follow up of left shoulder pain likely secondary to rotator cuff arthropathy and bicep tendinitis.  Treatment has included subacromial space steroid injection and physical therapy with home exercise program.  Patient was doing well up until a pulling episode a few weeks ago.  Repeated a subacromial space steroid injection.  If still symptomatic, could consider advanced imaging.  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  She is going away to [place].  We will see her back afterwards if needed.       No follow-ups on file.    Patient is in agreement with the above plan.    HPI:  Pat Gerber is a 65 y.o. female  who presents in follow up.  Here for   Chief Complaint   Patient presents with    Left Shoulder - Follow-up, Pain       Since last visit: See above.    Following history reviewed and updated:  Past Medical History[1]  Past Surgical History[2]  Social History   Social History     Substance and Sexual Activity   Alcohol Use Yes    Alcohol/week: 3.0 standard drinks of alcohol    Types: 3 Glasses of wine per week    Comment: social- 2-3 glasses of wine     Social History     Substance and Sexual Activity   Drug Use No     Tobacco Use History[3]  Family History[4]  Allergies[5]     Constitutional:  LMP  (LMP Unknown)    General: NAD.  Eyes: Clear sclerae.  ENT: No inflammation, lesion, or mass of lips.  No tracheal deviation.  Musculoskeletal: As mentioned below.  Integumentary: No visible rashes or skin lesions.  Pulmonary/Chest: Effort normal. No respiratory distress.   Neuro: CN's grossly intact, BETHEA.  Psych: Normal affect and judgement.  Vascular: WWP.    Left Shoulder Exam     Tenderness   The patient is experiencing tenderness in the acromion.    Tests   Gerber test: positive  Impingement: positive    Other   Erythema: absent  Scars: absent  Sensation: normal  Pulse: present              Large joint  arthrocentesis: L subacromial bursa    Performed by: Kanu Holman DO  Authorized by: Kanu Holman DO    Universal Protocol:  procedure performed by consultantConsent: Verbal consent obtained. Written consent not obtained  Risks and benefits: risks, benefits and alternatives were discussed  Consent given by: patient  Timeout called at: 6/9/2025 1:11 PM.  Patient understanding: patient states understanding of the procedure being performed  Site marked: the operative site was marked  Patient identity confirmed: verbally with patient  Supporting Documentation  Indications: pain     Is this a Visco injection? NoProcedure Details  Location: shoulder - L subacromial bursa  Needle gauge: 21G 2''  Ultrasound guidance: no  Approach: posterolateral  Medications administered: 80 mg triamcinolone acetonide 40 mg/mL; 10 mL ropivacaine 0.5 %    Patient tolerance: patient tolerated the procedure well with no immediate complications  Dressing:  Sterile dressing applied    There was little to no resistance encountered during the injection.    Risks of this procedure include:    - Risk of bleeding since a needle is involved.  - Risk of infection (1/10,000 chance as per recent studies).  Signs/symptoms were discussed and they would prompt an urgent evaluation at an emergency department.  - Risk of pigmentation or skin dimpling in the skin (2-3% chance as per recent studies) from the steroid.  - Risk of increased pain from steroid flare (1% chance as per recent studies) that typically lasts 24-48 hours.  - Risk of increased blood sugars from the steroid medication that can last for a few weeks.  If the patient is a diabetic or pre-diabetic, they were encouraged to closely monitor their blood sugars and discuss with PCP if elevated more than usual or if having symptoms.    The benefits outweigh the risks and so the procedure was completed.                 [1]   Past Medical History:  Diagnosis Date    Abnormal findings on diagnostic  imaging of breast     Last assessed 13    Adjustment disorder with depressed mood     Last assessed 13    Anxiety     Arthritis     hands    Erythema migrans (Lyme disease)     Last assessed 13    GERD (gastroesophageal reflux disease)     Polyarthritis     Last assessed 16    Uterine leiomyoma     Vertigo    [2]   Past Surgical History:  Procedure Laterality Date    BREAST BIOPSY Left 2002    BREAST SURGERY      CARPAL TUNNEL RELEASE Bilateral 10/2008    DENTAL SURGERY      DILATION AND CURETTAGE OF UTERUS      HYSTEROSCOPY      w/D and C on 06 for irregular menses.  Her pathology demonstrated proliferative endometrium    INDUCED       MOHS RECONSTRUCTION Right 2019    Procedure: RECONSTRUCTION MOHS DEFECT RIGHT NOSE;  Surgeon: Kamlesh Damon MD;  Location: [facility];  Service: Plastics    MOHS RECONSTRUCTION Left 2023    Procedure: RECONSTRUCTION MOHS DEFECT OF LEFT BROW/FOREHEAD WITH LOCAL FLAP;  Surgeon: Kamlesh Damon MD;  Location: [facility];  Service: Plastics    NV ADJT TIS REARGMT EYE/NOSE/EAR/LIP 10.1-30.0 [facility] Right 2019    Procedure: FLAP LOCAL RIGHT NOSE VS FTSG;  Surgeon: Kamlesh Damon MD;  Location: [facility];  Service: Plastics    SKIN BIOPSY      TONSILLECTOMY      WRIST SURGERY Right    [3]   Social History  Tobacco Use   Smoking Status Never   Smokeless Tobacco Never   [4]   Family History  Problem Relation Name Age of Onset    Arthritis Mother Mom     Hypertension Mother Mom     Coronary artery disease Mother Mom     Skin cancer Mother Mom     Varicose Veins Mother Mom     Uterine cancer Mother Mom     Arthritis Father Dad     Hypertension Father Dad     Stroke Father Dad     Coronary artery disease Father Dad     Other Father Dad         Stent indications    Skin cancer Sister      Ulcerative colitis Sister      Rheum arthritis Maternal Grandmother      No Known Problems Maternal Grandfather      No Known Problems  Paternal Grandmother      Heart disease Paternal Grandfather      No Known Problems Brother      Heart disease Maternal Aunt      Heart disease Maternal Aunt      Cancer Maternal Uncle Uncle lily     Substance Abuse Neg Hx      Mental illness Neg Hx     [5]   Allergies  Allergen Reactions    Other Allergic Rhinitis     Seasonal

## 2025-06-09 NOTE — ASSESSMENT & PLAN NOTE
Patient is here in follow up of left shoulder pain likely secondary to rotator cuff arthropathy and bicep tendinitis.  Treatment has included subacromial space steroid injection and physical therapy with home exercise program.  Patient was doing well up until a pulling episode a few weeks ago.  Repeated a subacromial space steroid injection.  If still symptomatic, could consider advanced imaging.  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  She is going away to [place].  We will see her back afterwards if needed.

## 2025-06-18 ENCOUNTER — OFFICE VISIT (OUTPATIENT)
Dept: FAMILY MEDICINE CLINIC | Facility: CLINIC | Age: 65
End: 2025-06-18
Payer: COMMERCIAL

## 2025-06-18 VITALS
DIASTOLIC BLOOD PRESSURE: 80 MMHG | BODY MASS INDEX: 21.24 KG/M2 | WEIGHT: 132.2 LBS | HEART RATE: 85 BPM | OXYGEN SATURATION: 98 % | SYSTOLIC BLOOD PRESSURE: 124 MMHG | RESPIRATION RATE: 15 BRPM | TEMPERATURE: 97.5 F | HEIGHT: 66 IN

## 2025-06-18 DIAGNOSIS — D72.819 LEUKOPENIA, UNSPECIFIED TYPE: ICD-10-CM

## 2025-06-18 DIAGNOSIS — G89.29 CHRONIC LEFT SHOULDER PAIN: ICD-10-CM

## 2025-06-18 DIAGNOSIS — I10 ESSENTIAL HYPERTENSION: Primary | ICD-10-CM

## 2025-06-18 DIAGNOSIS — M81.0 AGE-RELATED OSTEOPOROSIS WITHOUT CURRENT PATHOLOGICAL FRACTURE: ICD-10-CM

## 2025-06-18 DIAGNOSIS — T75.3XXA MOTION SICKNESS, INITIAL ENCOUNTER: ICD-10-CM

## 2025-06-18 DIAGNOSIS — K21.9 GASTROESOPHAGEAL REFLUX DISEASE, UNSPECIFIED WHETHER ESOPHAGITIS PRESENT: ICD-10-CM

## 2025-06-18 DIAGNOSIS — E78.5 HYPERLIPIDEMIA, UNSPECIFIED HYPERLIPIDEMIA TYPE: ICD-10-CM

## 2025-06-18 DIAGNOSIS — M25.512 CHRONIC LEFT SHOULDER PAIN: ICD-10-CM

## 2025-06-18 DIAGNOSIS — M06.09 RHEUMATOID ARTHRITIS OF MULTIPLE SITES WITH NEGATIVE RHEUMATOID FACTOR (HCC): ICD-10-CM

## 2025-06-18 PROCEDURE — 99214 OFFICE O/P EST MOD 30 MIN: CPT | Performed by: FAMILY MEDICINE

## 2025-06-18 RX ORDER — SCOPOLAMINE 1 MG/3D
1 PATCH, EXTENDED RELEASE TRANSDERMAL
Qty: 10 PATCH | Refills: 0 | Status: SHIPPED | OUTPATIENT
Start: 2025-06-18

## 2025-06-18 NOTE — PROGRESS NOTES
:  Assessment & Plan  Essential hypertension  - well controlled on HCTZ, will check lab work prior to next visit    Orders:    Comprehensive metabolic panel; Future    TSH, 3rd generation with Free T4 reflex; Future    Hyperlipidemia, unspecified hyperlipidemia type  - continue healthy life style changes, will recheck lipid panel    Orders:    Lipid Panel with Direct LDL reflex; Future    Gastroesophageal reflux disease, unspecified whether esophagitis present  - stable on Aciphex    Leukopenia, unspecified type  - stable, follow up with Hem-Onc    Rheumatoid arthritis of multiple sites with negative rheumatoid factor (HCC)  - following with Rheumatology    Motion sickness, initial encounter  - scopolamine patches prescribed, side effects reviewed    Orders:    scopolamine (TRANSDERM-SCOP) 1 mg/3 days TD 72 hr patch; Place 1 patch on the skin every third day over 72 hours    Age-related osteoporosis without current pathological fracture  - following with Rheumatology, on calcium and vitamin D supplements, due for repeat DEXA scan in October 2025    Chronic left shoulder pain  - following with Ortho, doing better after recent steroid injection       Return in 6 months or sooner as needed.   Patient understands and agrees with the treatment plan.       History of Present Illness     Pat Gerber is a 65 y.o. female who presents today for follow up visit for hypertension, hyperlipidemia. Patient is following with Ortho for left shoulder pain and recently received steroid injection with good relief. Patient has no other complaints at this time, she is going on a cruise vacation to [place] with her  and requesting a script for motion sickness medication.      Review of Systems   Constitutional:  Negative for appetite change, diaphoresis, fatigue and fever.   Respiratory:  Negative for cough, shortness of breath and wheezing.    Cardiovascular:  Negative for chest pain, palpitations and leg swelling.  "  Gastrointestinal:  Negative for abdominal pain, blood in stool, diarrhea, nausea and vomiting.   Genitourinary:  Negative for difficulty urinating, dysuria, frequency, hematuria and urgency.   Musculoskeletal:         As per HPI   Neurological:  Negative for dizziness, syncope, weakness, numbness and headaches.   Psychiatric/Behavioral:  Negative for dysphoric mood and sleep disturbance. The patient is not nervous/anxious.      Objective   /80   Pulse 85   Temp 97.5 °F (36.4 °C)   Resp 15   Ht 5' 5.5\" (1.664 m)   Wt 60 kg (132 lb 3.2 oz)   LMP  (LMP Unknown)   SpO2 98%   BMI 21.66 kg/m²      Wt Readings from Last 3 Encounters:   06/18/25 60 kg (132 lb 3.2 oz)   06/09/25 61.7 kg (136 lb)   04/09/25 62.1 kg (136 lb 12.8 oz)       Physical Exam  Constitutional:       General: She is not in acute distress.     Appearance: She is well-developed.   Neck:      Thyroid: No thyromegaly.      Vascular: No JVD.     Cardiovascular:      Rate and Rhythm: Normal rate and regular rhythm.      Heart sounds: Normal heart sounds. No murmur heard.  Pulmonary:      Effort: Pulmonary effort is normal. No respiratory distress.      Breath sounds: Normal breath sounds. No wheezing, rhonchi or rales.   Abdominal:      General: There is no distension.      Palpations: Abdomen is soft. There is no mass.      Tenderness: There is no abdominal tenderness.     Musculoskeletal:      Cervical back: Neck supple.      Right lower leg: No edema.      Left lower leg: No edema.   Lymphadenopathy:      Cervical: No cervical adenopathy.     Skin:     Findings: No rash.     Neurological:      Mental Status: She is alert and oriented to person, place, and time.     Psychiatric:         Mood and Affect: Mood normal.         Behavior: Behavior normal.         Thought Content: Thought content normal.         Judgment: Judgment normal.         Lab Results   Component Value Date    WBC 4.3 03/22/2025    HGB 13.7 03/22/2025    HCT 39.1 " 03/22/2025    .1 (H) 03/22/2025     03/22/2025     Lab Results   Component Value Date    CHOLESTEROL 185 08/03/2024    CHOLESTEROL 223 (H) 04/07/2023    CHOLESTEROL 197 04/24/2021     Lab Results   Component Value Date     08/03/2024     04/07/2023    HDL 97 04/24/2021     Lab Results   Component Value Date    TRIG 86 08/03/2024    TRIG 66 04/07/2023    TRIG 70 04/24/2021     Lab Results   Component Value Date    LDLCALC 61 08/03/2024     Lab Results   Component Value Date     07/01/2017    SODIUM 139 08/03/2024    K 4.0 08/03/2024     08/03/2024    CO2 31 08/03/2024    AGAP 6 05/20/2023    BUN 11 08/03/2024    CREATININE 0.71 08/03/2024    GLUC 95 08/03/2024    GLUF 98 05/20/2023    CALCIUM 9.5 08/03/2024    AST 21 08/03/2024    ALT 14 08/03/2024    ALKPHOS 82 08/03/2024    PROT 6.6 07/01/2017    TP 6.6 03/22/2025    BILITOT 0.6 07/01/2017    TBILI 0.7 08/03/2024    EGFR 94 08/03/2024

## 2025-07-14 ENCOUNTER — OFFICE VISIT (OUTPATIENT)
Dept: FAMILY MEDICINE CLINIC | Facility: CLINIC | Age: 65
End: 2025-07-14
Payer: COMMERCIAL

## 2025-07-14 VITALS
TEMPERATURE: 97.7 F | RESPIRATION RATE: 16 BRPM | HEIGHT: 66 IN | HEART RATE: 97 BPM | OXYGEN SATURATION: 99 % | WEIGHT: 131.6 LBS | BODY MASS INDEX: 21.15 KG/M2 | DIASTOLIC BLOOD PRESSURE: 84 MMHG | SYSTOLIC BLOOD PRESSURE: 132 MMHG

## 2025-07-14 DIAGNOSIS — R09.81 SINUS CONGESTION: Primary | ICD-10-CM

## 2025-07-14 PROCEDURE — 99213 OFFICE O/P EST LOW 20 MIN: CPT | Performed by: NURSE PRACTITIONER

## 2025-07-14 NOTE — PROGRESS NOTES
"Name: Pat Gerber      : 1960      MRN: [id number]  Encounter Provider: JUDI Farias  Encounter Date: 2025   Encounter department: [facility]  :  Assessment & Plan  Sinus congestion       Pt will restart Flonase and switch to Mucinex BID w/ full glass of water. If her sinus congestion does not improve later this week, can consider starting Prednisone taper for her. Patient is encouraged to call our office for any questions/concerns, persistent or worsening symptoms. Patient states they understand and agree with treatment plan.       Pt to f/u PRN.         History of Present Illness   Pt presents today for a sore throat and cough that started 3 days ago.  Her  has recently been sick over the last 2 weeks w/ a cough.  Denies pain w/ swallowing.  Denies fever.  She had chills several days ago.  She also notes green rhinorrhea.  Pt is taking Advil sinus congestion, but does not notice a huge improvement.  She has Flonase at home, but has not been taking recently.      Review of Systems  As noted per HPI.  Objective   /84   Pulse 97   Temp 97.7 °F (36.5 °C)   Resp 16   Ht 5' 5.5\" (1.664 m)   Wt 59.7 kg (131 lb 9.6 oz)   LMP  (LMP Unknown)   SpO2 99%   BMI 21.57 kg/m²      Physical Exam  Vitals reviewed.   Constitutional:       Appearance: Normal appearance.   HENT:      Right Ear: Tympanic membrane, ear canal and external ear normal.      Left Ear: Tympanic membrane, ear canal and external ear normal.      Nose: Congestion and rhinorrhea (clear) present.      Mouth/Throat:      Pharynx: No oropharyngeal exudate or posterior oropharyngeal erythema.   Neck:      Vascular: No carotid bruit.     Cardiovascular:      Rate and Rhythm: Normal rate and regular rhythm.      Pulses: Normal pulses.      Heart sounds: Normal heart sounds.   Pulmonary:      Effort: Pulmonary effort is normal.      Breath sounds: Normal breath sounds. "   Lymphadenopathy:      Cervical: No cervical adenopathy.     Neurological:      Mental Status: She is alert and oriented to person, place, and time. Mental status is at baseline.     Psychiatric:         Mood and Affect: Mood normal.         Behavior: Behavior normal.         Thought Content: Thought content normal.         Judgment: Judgment normal.

## 2025-08-07 ENCOUNTER — HOSPITAL ENCOUNTER (OUTPATIENT)
Dept: MAMMOGRAPHY | Facility: HOSPITAL | Age: 65
Discharge: HOME | End: 2025-08-07
Payer: COMMERCIAL

## 2025-08-07 VITALS — BODY MASS INDEX: 21.05 KG/M2 | HEIGHT: 66 IN | WEIGHT: 131 LBS

## 2025-08-07 DIAGNOSIS — Z12.31 VISIT FOR SCREENING MAMMOGRAM: ICD-10-CM

## 2025-08-07 PROCEDURE — 77063 BREAST TOMOSYNTHESIS BI: CPT

## 2025-08-07 PROCEDURE — 77067 SCR MAMMO BI INCL CAD: CPT

## 2025-08-18 ENCOUNTER — OFFICE VISIT (OUTPATIENT)
Dept: RHEUMATOLOGY | Facility: CLINIC | Age: 65
End: 2025-08-18
Payer: COMMERCIAL

## 2025-08-18 VITALS
HEART RATE: 71 BPM | SYSTOLIC BLOOD PRESSURE: 134 MMHG | OXYGEN SATURATION: 99 % | BODY MASS INDEX: 21.53 KG/M2 | HEIGHT: 66 IN | WEIGHT: 134 LBS | DIASTOLIC BLOOD PRESSURE: 84 MMHG

## 2025-08-18 DIAGNOSIS — Z79.1 ENCOUNTER FOR LONG-TERM (CURRENT) USE OF NSAIDS: ICD-10-CM

## 2025-08-18 DIAGNOSIS — M15.9 OSTEOARTHRITIS OF MULTIPLE JOINTS, UNSPECIFIED OSTEOARTHRITIS TYPE: ICD-10-CM

## 2025-08-18 DIAGNOSIS — E55.9 HYPOVITAMINOSIS D: ICD-10-CM

## 2025-08-18 DIAGNOSIS — M81.0 AGE-RELATED OSTEOPOROSIS WITHOUT CURRENT PATHOLOGICAL FRACTURE: Primary | ICD-10-CM

## 2025-08-18 PROCEDURE — 99214 OFFICE O/P EST MOD 30 MIN: CPT | Performed by: STUDENT IN AN ORGANIZED HEALTH CARE EDUCATION/TRAINING PROGRAM

## (undated) DEVICE — DECANTER: Brand: UNBRANDED

## (undated) DEVICE — GLOVE SRG BIOGEL 7

## (undated) DEVICE — ADHESIVE SKN CLSR HISTOACRYL FLEX 0.5ML LF

## (undated) DEVICE — OCCLUSIVE GAUZE STRIP,3% BISMUTH TRIBROMOPHENATE IN PETROLATUM BLEND: Brand: XEROFORM

## (undated) DEVICE — NEEDLE 27 G X 1 1/4

## (undated) DEVICE — 3M™ STERI-STRIP™ REINFORCED ADHESIVE SKIN CLOSURES, R1547, 1/2 IN X 4 IN (12 MM X 100 MM), 6 STRIPS/ENVELOPE: Brand: 3M™ STERI-STRIP™

## (undated) DEVICE — VESSEL CANNULA

## (undated) DEVICE — DRESSING SILON DUAL-DRESS 50 FOAM 5.5 X 6 IN

## (undated) DEVICE — TUBING SUCTION 5MM X 12 FT

## (undated) DEVICE — SKIN MARKER DUAL TIP WITH RULER CAP, FLEXIBLE RULER AND LABELS: Brand: DEVON

## (undated) DEVICE — SUT SILK 5-0 P-3 18 IN 640G

## (undated) DEVICE — ASTOUND STANDARD SURGICAL GOWN, XL: Brand: CONVERTORS

## (undated) DEVICE — BETHLEHEM UNIVERSAL OUTPATIENT: Brand: CARDINAL HEALTH

## (undated) DEVICE — TIBURON SPLIT SHEET: Brand: CONVERTORS

## (undated) DEVICE — ELECTRODE NEEDLE MEGAFINE 2IN E-Z CLEAN MEGADYNE -0118

## (undated) DEVICE — INTENDED FOR TISSUE SEPARATION, AND OTHER PROCEDURES THAT REQUIRE A SHARP SURGICAL BLADE TO PUNCTURE OR CUT.: Brand: BARD-PARKER ® CARBON RIB-BACK BLADES

## (undated) DEVICE — PENCIL ELECTROSURG E-Z CLEAN -0035H

## (undated) DEVICE — INTENDED FOR TISSUE SEPARATION, AND OTHER PROCEDURES THAT REQUIRE A SHARP SURGICAL BLADE TO PUNCTURE OR CUT.: Brand: BARD-PARKER SAFETY BLADES SIZE 15, STERILE

## (undated) DEVICE — SUT CHROMIC 6-0 G-1 18 IN 796G

## (undated) DEVICE — PAD GROUNDING ADULT

## (undated) DEVICE — SUT MONOCRYL 5-0 P-3 18 IN Y493G

## (undated) DEVICE — IV CATH INTROCAN 18G X 1 1/4 SAFETY

## (undated) DEVICE — ELECTRODE BLADE MOD E-Z CLEAN 2.5IN 6.4CM -0012M

## (undated) DEVICE — 10FR FRAZIER SUCTION HANDLE: Brand: CARDINAL HEALTH